# Patient Record
Sex: FEMALE | Race: WHITE | Employment: OTHER | URBAN - METROPOLITAN AREA
[De-identification: names, ages, dates, MRNs, and addresses within clinical notes are randomized per-mention and may not be internally consistent; named-entity substitution may affect disease eponyms.]

---

## 2017-09-07 ENCOUNTER — OFFICE VISIT (OUTPATIENT)
Dept: INTERNAL MEDICINE CLINIC | Age: 57
End: 2017-09-07

## 2017-09-07 VITALS
RESPIRATION RATE: 16 BRPM | HEIGHT: 70 IN | DIASTOLIC BLOOD PRESSURE: 80 MMHG | SYSTOLIC BLOOD PRESSURE: 130 MMHG | WEIGHT: 293 LBS | BODY MASS INDEX: 41.95 KG/M2

## 2017-09-07 DIAGNOSIS — I27.20 PULMONARY HTN (HCC): ICD-10-CM

## 2017-09-07 DIAGNOSIS — E66.01 MORBID OBESITY DUE TO EXCESS CALORIES (HCC): ICD-10-CM

## 2017-09-07 DIAGNOSIS — E11.9 TYPE 2 DIABETES MELLITUS WITHOUT COMPLICATION, WITHOUT LONG-TERM CURRENT USE OF INSULIN (HCC): Primary | ICD-10-CM

## 2017-09-07 DIAGNOSIS — E78.2 MIXED HYPERLIPIDEMIA: ICD-10-CM

## 2017-09-07 DIAGNOSIS — M51.36 DDD (DEGENERATIVE DISC DISEASE), LUMBAR: ICD-10-CM

## 2017-09-07 DIAGNOSIS — E03.4 HYPOTHYROIDISM DUE TO ACQUIRED ATROPHY OF THYROID: ICD-10-CM

## 2017-09-07 DIAGNOSIS — E04.1 THYROID NODULE: ICD-10-CM

## 2017-09-07 DIAGNOSIS — K21.9 GASTROESOPHAGEAL REFLUX DISEASE, ESOPHAGITIS PRESENCE NOT SPECIFIED: ICD-10-CM

## 2017-09-07 DIAGNOSIS — G47.33 OBSTRUCTIVE SLEEP APNEA SYNDROME: ICD-10-CM

## 2017-09-07 DIAGNOSIS — M79.671 PAIN OF RIGHT HEEL: ICD-10-CM

## 2017-09-07 DIAGNOSIS — J30.9 ALLERGIC RHINITIS, UNSPECIFIED ALLERGIC RHINITIS TRIGGER, UNSPECIFIED RHINITIS SEASONALITY: ICD-10-CM

## 2017-09-07 DIAGNOSIS — F50.89 COMPULSIVE OVEREATER: ICD-10-CM

## 2017-09-07 DIAGNOSIS — F32.9 REACTIVE DEPRESSION: ICD-10-CM

## 2017-09-07 DIAGNOSIS — E55.9 VITAMIN D DEFICIENCY: ICD-10-CM

## 2017-09-07 DIAGNOSIS — I10 ESSENTIAL HYPERTENSION: ICD-10-CM

## 2017-09-07 LAB — HBA1C MFR BLD: 9 %

## 2017-09-07 PROCEDURE — 90472 IMMUNIZATION ADMIN EACH ADD: CPT | Performed by: INTERNAL MEDICINE

## 2017-09-07 PROCEDURE — 83036 HEMOGLOBIN GLYCOSYLATED A1C: CPT | Performed by: INTERNAL MEDICINE

## 2017-09-07 PROCEDURE — 90715 TDAP VACCINE 7 YRS/> IM: CPT | Performed by: INTERNAL MEDICINE

## 2017-09-07 PROCEDURE — 90686 IIV4 VACC NO PRSV 0.5 ML IM: CPT | Performed by: INTERNAL MEDICINE

## 2017-09-07 PROCEDURE — 90471 IMMUNIZATION ADMIN: CPT | Performed by: INTERNAL MEDICINE

## 2017-09-07 PROCEDURE — 99205 OFFICE O/P NEW HI 60 MIN: CPT | Performed by: INTERNAL MEDICINE

## 2017-09-07 RX ORDER — AZELASTINE 1 MG/ML
1 SPRAY, METERED NASAL 2 TIMES DAILY
COMMUNITY
End: 2018-03-23 | Stop reason: ALTCHOICE

## 2017-09-07 RX ORDER — GLIPIZIDE 5 MG/1
5 TABLET ORAL
COMMUNITY
End: 2018-04-04 | Stop reason: SDUPTHER

## 2017-09-07 RX ORDER — LEVOTHYROXINE SODIUM 0.12 MG/1
125 TABLET ORAL DAILY
COMMUNITY
End: 2017-12-14 | Stop reason: SDUPTHER

## 2017-09-07 RX ORDER — FAMOTIDINE 10 MG
10 TABLET ORAL 2 TIMES DAILY
COMMUNITY
End: 2018-03-23

## 2017-09-07 RX ORDER — MULTIVIT WITH MINERALS/LUTEIN
250 TABLET ORAL DAILY
COMMUNITY
End: 2018-03-23 | Stop reason: DRUGHIGH

## 2017-09-07 RX ORDER — VENLAFAXINE 75 MG/1
75 TABLET ORAL 3 TIMES DAILY
COMMUNITY
End: 2018-03-23 | Stop reason: DRUGHIGH

## 2017-09-07 RX ORDER — M-VIT,TX,IRON,MINS/CALC/FOLIC 27MG-0.4MG
1 TABLET ORAL DAILY
COMMUNITY

## 2017-09-07 RX ORDER — MONTELUKAST SODIUM 10 MG/1
10 TABLET ORAL NIGHTLY
COMMUNITY
End: 2018-04-04 | Stop reason: SDUPTHER

## 2017-09-07 RX ORDER — ATORVASTATIN CALCIUM 10 MG/1
10 TABLET, FILM COATED ORAL DAILY
COMMUNITY
End: 2018-03-19 | Stop reason: SDUPTHER

## 2017-09-07 RX ORDER — LISINOPRIL 10 MG/1
10 TABLET ORAL DAILY
COMMUNITY
End: 2018-02-12 | Stop reason: SDUPTHER

## 2017-09-07 RX ORDER — ASPIRIN 81 MG/1
81 TABLET, CHEWABLE ORAL DAILY
COMMUNITY

## 2017-09-07 ASSESSMENT — ENCOUNTER SYMPTOMS
ALLERGIC/IMMUNOLOGIC NEGATIVE: 1
BLURRED VISION: 1
SHORTNESS OF BREATH: 0
RESPIRATORY NEGATIVE: 1
VISUAL CHANGE: 0
GASTROINTESTINAL NEGATIVE: 1
ORTHOPNEA: 0

## 2017-11-07 ENCOUNTER — OFFICE VISIT (OUTPATIENT)
Dept: ORTHOPEDIC SURGERY | Age: 57
End: 2017-11-07

## 2017-11-07 VITALS
DIASTOLIC BLOOD PRESSURE: 65 MMHG | WEIGHT: 293 LBS | RESPIRATION RATE: 16 BRPM | BODY MASS INDEX: 41.95 KG/M2 | HEIGHT: 70 IN | SYSTOLIC BLOOD PRESSURE: 111 MMHG | HEART RATE: 68 BPM

## 2017-11-07 DIAGNOSIS — E66.01 MORBID OBESITY WITH BMI OF 50.0-59.9, ADULT (HCC): ICD-10-CM

## 2017-11-07 DIAGNOSIS — T84.84XA PAIN DUE TO TOTAL KNEE REPLACEMENT, INITIAL ENCOUNTER (HCC): Primary | ICD-10-CM

## 2017-11-07 DIAGNOSIS — Z96.659 PAIN DUE TO TOTAL KNEE REPLACEMENT, INITIAL ENCOUNTER (HCC): Primary | ICD-10-CM

## 2017-11-07 DIAGNOSIS — M24.661 ARTHROFIBROSIS OF KNEE JOINT, RIGHT: ICD-10-CM

## 2017-11-07 PROCEDURE — 99204 OFFICE O/P NEW MOD 45 MIN: CPT | Performed by: ORTHOPAEDIC SURGERY

## 2017-11-07 PROCEDURE — 1036F TOBACCO NON-USER: CPT | Performed by: ORTHOPAEDIC SURGERY

## 2017-11-07 PROCEDURE — 3014F SCREEN MAMMO DOC REV: CPT | Performed by: ORTHOPAEDIC SURGERY

## 2017-11-07 PROCEDURE — G8417 CALC BMI ABV UP PARAM F/U: HCPCS | Performed by: ORTHOPAEDIC SURGERY

## 2017-11-07 PROCEDURE — G8428 CUR MEDS NOT DOCUMENT: HCPCS | Performed by: ORTHOPAEDIC SURGERY

## 2017-11-07 PROCEDURE — G8484 FLU IMMUNIZE NO ADMIN: HCPCS | Performed by: ORTHOPAEDIC SURGERY

## 2017-11-07 PROCEDURE — 3017F COLORECTAL CA SCREEN DOC REV: CPT | Performed by: ORTHOPAEDIC SURGERY

## 2017-11-07 NOTE — PROGRESS NOTES
NEW PATIENT ORTHOPAEDIC NOTE    Chief Complaint   Patient presents with    Knee Pain     rt knee pain         HPI  59 y.o. female seen for evaluation of right knee pain. She recently moved to [place] from [place]. Not currently working, previously did payroll  Long history in re: right knee  Right primary TKA in 2004, and she reports lysis of adhesions in 2005 (no components revised)  Underwent right knee revision (patellar button and tibial component with long stemmed implant) in 2012 for aseptic loosening. Femoral component was stable and left intact  She reports has had intermittent symptoms on and off since that revision surgery, mainly medially. It is symptomatic at night, at rest, and with activity  She reports prior to moving here, she saw the treating orthopaedic surgeon's PA in April, and was given what sounds like a pes bursa injection without relief. She states initial ROM post-op was ~ 0 -90, she has stiffened up over the past 2 years, and has been stable since. Left TKA in 2009, and is doing well with that side. She has gained some weight recently due to inability to exercise. I have reviewed and discussed the below pain assessment findings with the patient.   Pain Assessment  Location of Pain: Knee  Location Modifiers: Right  Severity of Pain: 2  Quality of Pain: Aching, Sharp  Duration of Pain:  (walking )  Frequency of Pain:  (walking )  Date Pain First Started:  (on going )  Aggravating Factors: Walking  Limiting Behavior: Some  Relieving Factors: Rest  Result of Injury: No  Work-Related Injury: No  Are there other pain locations you wish to document?: No    ROS  I have read over the ROS from the Patient History Form dated on 11/7/2017  Pertinent positives include headaches/sinus trouble, hypothyroidism, asthma, HTN, peripheral edema, back pain, depression  Rest of 13 point ROS otherwise negative except per HPI, and scanned into the patient's chart under the Media tab.      Allergies   Allergen Reactions    Penicillins Rash        Current Outpatient Prescriptions   Medication Sig Dispense Refill    vitamin D (CHOLECALCIFEROL) 1000 units TABS tablet Take 1,000 Units by mouth daily      azelastine (ASTELIN) 0.1 % nasal spray 1 spray by Nasal route 2 times daily Use in each nostril as directed      fluticasone-salmeterol (ADVAIR) 100-50 MCG/DOSE diskus inhaler Inhale 1 puff into the lungs every 12 hours      levothyroxine (SYNTHROID) 125 MCG tablet Take 125 mcg by mouth Daily      Eflornithine HCl (VANIQA EX) Apply topically      lisinopril (PRINIVIL;ZESTRIL) 10 MG tablet Take 10 mg by mouth daily      atorvastatin (LIPITOR) 10 MG tablet Take 10 mg by mouth daily      glipiZIDE (GLUCOTROL) 5 MG tablet Take 5 mg by mouth 2 times daily (before meals)      venlafaxine (EFFEXOR) 75 MG tablet Take 75 mg by mouth 3 times daily      aspirin 81 MG chewable tablet Take 81 mg by mouth daily      Omega-3 Fatty Acids (FISH OIL CONCENTRATE PO) Take by mouth      Multiple Vitamins-Minerals (THERAPEUTIC MULTIVITAMIN-MINERALS) tablet Take 1 tablet by mouth daily      CINNAMON PO Take by mouth      TURMERIC CURCUMIN PO Take by mouth      Flaxseed, Linseed, (FLAXSEED OIL PO) Take by mouth      Ascorbic Acid (VITAMIN C) 250 MG tablet Take 250 mg by mouth daily      Cyanocobalamin (VITAMIN B-12 PO) Take by mouth      FIBER COMPLETE PO Take by mouth      Calcium Acetate, Phos Binder, (CALCIUM ACETATE PO) Take by mouth      montelukast (SINGULAIR) 10 MG tablet Take 10 mg by mouth nightly      famotidine (PEPCID) 10 MG tablet Take 10 mg by mouth 2 times daily      metFORMIN (GLUCOPHAGE) 500 MG tablet Take 1 tablet by mouth 2 times daily (with meals) 60 tablet 3    canagliflozin (INVOKANA) 100 MG TABS tablet Take 1 tablet by mouth every morning (before breakfast) 30 tablet 5     No current facility-administered medications for this visit.         Past Medical History:   Diagnosis Date    Allergic rhinitis     Asthma     Compulsive overeater     DDD (degenerative disc disease), lumbar     Depression     Diabetes mellitus ([facility]     Diabetic retinopathy ([facility]     GERD (gastroesophageal reflux disease)     Hyperlipidemia     Hypertension     Hypothyroidism     Obesity     Pulmonary HTN     Sleep apnea     cpap    Thyroid nodule     Type 2 diabetes mellitus without complication (HCC)     Vitamin D deficiency         Past Surgical History:   Procedure Laterality Date    JOINT REPLACEMENT Bilateral     tkr    LASIK      REVISION TOTAL KNEE ARTHROPLASTY Right     TOTAL KNEE ARTHROPLASTY Bilateral     WISDOM TOOTH EXTRACTION         Family History   Problem Relation Age of Onset    Other Mother      emphysema, subdural hematoma?  Heart Disease Father     Diabetes Father     Diabetes Maternal Grandmother     Heart Disease Maternal Grandmother     Thyroid Disease Maternal Grandmother        Social History     Social History    Marital status: Single     Spouse name: N/A    Number of children: N/A    Years of education: N/A     Occupational History    Not on file.      Social History Main Topics    Smoking status: Never Smoker    Smokeless tobacco: Never Used    Alcohol use No    Drug use: No    Sexual activity: Not Currently     Other Topics Concern    Not on file     Social History Narrative    No narrative on file        Vitals:    11/07/17 0808   BP: 111/65   Pulse: 68   Resp: 16   Weight: (!) 347 lb (157.4 kg)   Height: 5' 9.5\" (1.765 m)       Physical Exam  Constitutional  well-groomed, well-nourished, morbidly obese  Psychiatric  pleasant,  normal mood & affect, oriented to place, person, and situation  Cardiovascular  RRR, positive peripheral edema, minimal varicose veins  Respiratory  respirations even and unlabored  Gastrointestinal  abdomen soft NDNT and protuberant belly  Skin  no rashes, wounds, or lesions seen  Neck/Spine - negative SLR  Neurological - SILT SP/DP/T/sural/saphenous nerve distributions; EHL/TA/GS intact  Left Lower Extremity: Examination of the left lower extremity does not show any tenderness, deformity or injury. Range of motion is unremarkable, 0 - 100. There is no gross instability. There are no rashes, ulcerations or lesions. Strength and tone are normal.  Right knee - mild effusion, normal alignment   Previous midline scar well healed, no erythema or drainage   Mild TTP medial joint line   Stable to varus and valgus stress   ROM 14 - 75 degrees, normal patellar tracking    Imaging:  Images were personally reviewed by myself and discussed with the patient  Right knee 4 views performed today in clinic   - s/p L TKA. S/p R TKA. Long stemmed revision tibial component with cementation. There is lucency beneath the tibial base plate cement, cannot correlate to previous XRs as these are not available from [place]. However, there is no lysis or pedestal-ing around the tibial stem. Cement mantle distally looks intact. Femoral component looks stable. Lucency around the patellar button with mild to moderate lateral patellar tilt. Assessment & Plan:  59 y.o. female who presents with   1. Pain due to total knee replacement, initial encounter ([facility])  XR KNEE RIGHT (MIN 4 VIEWS)    RIGHT   2. Arthrofibrosis of knee joint, right     3. Morbid obesity with BMI of 50.0-59.9, adult ([address]  [employer] [facility]       No orders of the defined types were placed in this encounter.     I have recommended weight loss referral to reduce stress on implants  There is evidence of lucency, however components appear stable for the most part, monitor for now  Continue ice and NSAIDs  No injections

## 2017-11-09 ENCOUNTER — HOSPITAL ENCOUNTER (OUTPATIENT)
Dept: WOMENS IMAGING | Age: 57
Discharge: HOME | End: 2017-11-09
Attending: INTERNAL MEDICINE | Admitting: INTERNAL MEDICINE

## 2017-11-09 ENCOUNTER — TELEPHONE (OUTPATIENT)
Dept: BARIATRICS/WEIGHT MGMT | Age: 57
End: 2017-11-09

## 2017-11-09 DIAGNOSIS — Z12.31 VISIT FOR SCREENING MAMMOGRAM: ICD-10-CM

## 2017-11-28 DIAGNOSIS — E11.9 TYPE 2 DIABETES MELLITUS WITHOUT COMPLICATION, WITHOUT LONG-TERM CURRENT USE OF INSULIN (HCC): ICD-10-CM

## 2017-12-13 ENCOUNTER — OFFICE VISIT (OUTPATIENT)
Dept: INTERNAL MEDICINE CLINIC | Age: 57
End: 2017-12-13

## 2017-12-13 VITALS
RESPIRATION RATE: 16 BRPM | HEART RATE: 88 BPM | WEIGHT: 293 LBS | SYSTOLIC BLOOD PRESSURE: 126 MMHG | HEIGHT: 70 IN | BODY MASS INDEX: 41.95 KG/M2 | DIASTOLIC BLOOD PRESSURE: 80 MMHG

## 2017-12-13 DIAGNOSIS — F50.89 COMPULSIVE OVEREATER: ICD-10-CM

## 2017-12-13 DIAGNOSIS — E11.9 TYPE 2 DIABETES MELLITUS WITHOUT COMPLICATION, WITHOUT LONG-TERM CURRENT USE OF INSULIN (HCC): Primary | ICD-10-CM

## 2017-12-13 DIAGNOSIS — F32.9 REACTIVE DEPRESSION: ICD-10-CM

## 2017-12-13 DIAGNOSIS — G47.33 OBSTRUCTIVE SLEEP APNEA SYNDROME: ICD-10-CM

## 2017-12-13 DIAGNOSIS — J01.10 ACUTE NON-RECURRENT FRONTAL SINUSITIS: Primary | ICD-10-CM

## 2017-12-13 DIAGNOSIS — I27.20 PULMONARY HTN (HCC): ICD-10-CM

## 2017-12-13 DIAGNOSIS — E78.2 MIXED HYPERLIPIDEMIA: ICD-10-CM

## 2017-12-13 DIAGNOSIS — E04.1 THYROID NODULE: ICD-10-CM

## 2017-12-13 DIAGNOSIS — I10 ESSENTIAL HYPERTENSION: ICD-10-CM

## 2017-12-13 DIAGNOSIS — E55.9 VITAMIN D DEFICIENCY: ICD-10-CM

## 2017-12-13 DIAGNOSIS — E03.4 HYPOTHYROIDISM DUE TO ACQUIRED ATROPHY OF THYROID: ICD-10-CM

## 2017-12-13 DIAGNOSIS — K21.9 GASTROESOPHAGEAL REFLUX DISEASE, ESOPHAGITIS PRESENCE NOT SPECIFIED: ICD-10-CM

## 2017-12-13 LAB
A/G RATIO: 1.4 (ref 1.1–2.2)
ALBUMIN SERPL-MCNC: 4.3 G/DL (ref 3.4–5)
ALP BLD-CCNC: 86 U/L (ref 40–129)
ALT SERPL-CCNC: 29 U/L (ref 10–40)
ANION GAP SERPL CALCULATED.3IONS-SCNC: 19 MMOL/L (ref 3–16)
AST SERPL-CCNC: 28 U/L (ref 15–37)
BILIRUB SERPL-MCNC: 0.4 MG/DL (ref 0–1)
BUN BLDV-MCNC: 17 MG/DL (ref 7–20)
CALCIUM SERPL-MCNC: 9.9 MG/DL (ref 8.3–10.6)
CHLORIDE BLD-SCNC: 98 MMOL/L (ref 99–110)
CHOLESTEROL, TOTAL: 158 MG/DL (ref 0–199)
CO2: 24 MMOL/L (ref 21–32)
CREAT SERPL-MCNC: 0.9 MG/DL (ref 0.6–1.1)
CREATININE URINE: 200.9 MG/DL (ref 28–259)
GFR AFRICAN AMERICAN: >60
GFR NON-AFRICAN AMERICAN: >60
GLOBULIN: 3.1 G/DL
GLUCOSE BLD-MCNC: 170 MG/DL (ref 70–99)
HBA1C MFR BLD: 7.9 %
HDLC SERPL-MCNC: 41 MG/DL (ref 40–60)
LDL CHOLESTEROL CALCULATED: 87 MG/DL
MICROALBUMIN UR-MCNC: <1.2 MG/DL
MICROALBUMIN/CREAT UR-RTO: NORMAL MG/G (ref 0–30)
POTASSIUM SERPL-SCNC: 4.8 MMOL/L (ref 3.5–5.1)
SODIUM BLD-SCNC: 141 MMOL/L (ref 136–145)
T4 FREE: 1.4 NG/DL (ref 0.9–1.8)
TOTAL PROTEIN: 7.4 G/DL (ref 6.4–8.2)
TRIGL SERPL-MCNC: 150 MG/DL (ref 0–150)
TSH REFLEX: 5.26 UIU/ML (ref 0.27–4.2)
VITAMIN D 25-HYDROXY: 37.2 NG/ML
VLDLC SERPL CALC-MCNC: 30 MG/DL

## 2017-12-13 PROCEDURE — 3045F PR MOST RECENT HEMOGLOBIN A1C LEVEL 7.0-9.0%: CPT | Performed by: INTERNAL MEDICINE

## 2017-12-13 PROCEDURE — 3014F SCREEN MAMMO DOC REV: CPT | Performed by: INTERNAL MEDICINE

## 2017-12-13 PROCEDURE — 1036F TOBACCO NON-USER: CPT | Performed by: INTERNAL MEDICINE

## 2017-12-13 PROCEDURE — 99214 OFFICE O/P EST MOD 30 MIN: CPT | Performed by: INTERNAL MEDICINE

## 2017-12-13 PROCEDURE — G8427 DOCREV CUR MEDS BY ELIG CLIN: HCPCS | Performed by: INTERNAL MEDICINE

## 2017-12-13 PROCEDURE — 36415 COLL VENOUS BLD VENIPUNCTURE: CPT | Performed by: INTERNAL MEDICINE

## 2017-12-13 PROCEDURE — 3017F COLORECTAL CA SCREEN DOC REV: CPT | Performed by: INTERNAL MEDICINE

## 2017-12-13 PROCEDURE — G8484 FLU IMMUNIZE NO ADMIN: HCPCS | Performed by: INTERNAL MEDICINE

## 2017-12-13 PROCEDURE — G8417 CALC BMI ABV UP PARAM F/U: HCPCS | Performed by: INTERNAL MEDICINE

## 2017-12-13 PROCEDURE — 83036 HEMOGLOBIN GLYCOSYLATED A1C: CPT | Performed by: INTERNAL MEDICINE

## 2017-12-13 RX ORDER — AZITHROMYCIN 250 MG/1
TABLET, FILM COATED ORAL
Qty: 1 PACKET | Refills: 0 | Status: SHIPPED | OUTPATIENT
Start: 2017-12-13 | End: 2017-12-23

## 2017-12-13 ASSESSMENT — ENCOUNTER SYMPTOMS
WHEEZING: 0
SHORTNESS OF BREATH: 0
SINUS PAIN: 1
VISUAL CHANGE: 0
ORTHOPNEA: 0
SORE THROAT: 1
RHINORRHEA: 1
CHEST TIGHTNESS: 0
COUGH: 0
SINUS PRESSURE: 1
BLURRED VISION: 0

## 2017-12-13 NOTE — PROGRESS NOTES
Subjective:    cc:  dm, htn, hld, gillian, hypothyroid, depression check  Patient ID: Parrish Lizama is a 59 y.o. female. Diabetes   She presents for her follow-up diabetic visit. She has type 2 diabetes mellitus. Her disease course has been stable. Hypoglycemia symptoms include headaches. Pertinent negatives for hypoglycemia include no sweats. There are no diabetic associated symptoms. Pertinent negatives for diabetes include no blurred vision, no chest pain, no fatigue, no foot paresthesias, no polydipsia, no polyphagia, no polyuria, no visual change and no weight loss. There are no hypoglycemic complications. Symptoms are stable. Diabetic complications include retinopathy. Pertinent negatives for diabetic complications include no peripheral neuropathy. Risk factors for coronary artery disease include dyslipidemia, diabetes mellitus, hypertension and sedentary lifestyle. Current diabetic treatment includes oral agent (dual therapy). She is compliant with treatment all of the time. Her weight is decreasing steadily (wt down 15 lbs). She is following a generally unhealthy diet. When asked about meal planning, she reported none. She has had a previous visit with a dietitian. She never participates in exercise. There is no compliance with monitoring of blood glucose. An ACE inhibitor/angiotensin II receptor blocker is being taken. She does not see a podiatrist.Eye exam is current. Hypertension   This is a chronic problem. The current episode started more than 1 year ago. The problem is unchanged. The problem is controlled. Associated symptoms include headaches and peripheral edema. Pertinent negatives include no anxiety, blurred vision, chest pain, orthopnea, palpitations, PND, shortness of breath or sweats. There are no associated agents to hypertension. Risk factors for coronary artery disease include diabetes mellitus, obesity, post-menopausal state, sedentary lifestyle and dyslipidemia.  Past treatments include ACE chewable tablet Take 81 mg by mouth daily Yes Historical Provider, MD   Omega-3 Fatty Acids (FISH OIL CONCENTRATE PO) Take by mouth Yes Historical Provider, MD   Multiple Vitamins-Minerals (THERAPEUTIC MULTIVITAMIN-MINERALS) tablet Take 1 tablet by mouth daily Yes Historical Provider, MD   CINNAMON PO Take by mouth Yes Historical Provider, MD   TURMERIC CURCUMIN PO Take by mouth Yes Historical Provider, MD   Flaxseed, Linseed, (FLAXSEED OIL PO) Take by mouth Yes Historical Provider, MD   Ascorbic Acid (VITAMIN C) 250 MG tablet Take 250 mg by mouth daily Yes Historical Provider, MD   Cyanocobalamin (VITAMIN B-12 PO) Take by mouth Yes Historical Provider, MD   FIBER COMPLETE PO Take by mouth Yes Historical Provider, MD   Calcium Acetate, Phos Binder, (CALCIUM ACETATE PO) Take by mouth Yes Historical Provider, MD   montelukast (SINGULAIR) 10 MG tablet Take 10 mg by mouth nightly Yes Historical Provider, MD   famotidine (PEPCID) 10 MG tablet Take 10 mg by mouth 2 times daily Yes Historical Provider, MD   metFORMIN (GLUCOPHAGE) 500 MG tablet Take 1 tablet by mouth 2 times daily (with meals) Yes Jere Nichole MD           /80 (Site: Left Arm, Position: Sitting, Cuff Size: Thigh)   Pulse 88   Resp 16   Ht 5' 9.5\" (1.765 m)   Wt (!) 332 lb (150.6 kg)   BMI 48.32 kg/m²     Objective:   Physical Exam   Constitutional: She is oriented to person, place, and time. She appears well-developed and well-nourished. No distress. HENT:   Head: Normocephalic and atraumatic. Right Ear: External ear normal.   Left Ear: External ear normal.   Nose: Nose normal.   Mouth/Throat: Oropharynx is clear and moist. No oropharyngeal exudate. Eyes: Conjunctivae and EOM are normal. Pupils are equal, round, and reactive to light. Right eye exhibits no discharge. Left eye exhibits no discharge. No scleral icterus. Neck: No JVD present.    No bruits   Cardiovascular: Normal rate, regular rhythm, normal heart sounds and intact distal pulses. Exam reveals no gallop and no friction rub. No murmur heard. Pulmonary/Chest: Effort normal and breath sounds normal. No respiratory distress. She has no wheezes. She has no rales. Musculoskeletal: She exhibits edema. Trace edema   Lymphadenopathy:     She has no cervical adenopathy. Neurological: She is alert and oriented to person, place, and time. Monofilament normal bilat feet   Skin: Skin is warm and dry. She is not diaphoretic. No erythema. Psychiatric: She has a normal mood and affect. Her behavior is normal. Judgment and thought content normal.   Vitals reviewed. Assessment:      1. Type 2 diabetes mellitus without complication, without long-term current use of insulin ([address]    2. Essential hypertension    3. Mixed hyperlipidemia    4. Thyroid nodule    5. Hypothyroidism due to acquired atrophy of thyroid    6. Obstructive sleep apnea syndrome    7. Gastroesophageal reflux disease, esophagitis presence not specified    8. Reactive depression    9. Compulsive overeater    10. Vitamin D deficiency    11. Pulmonary HTN            Plan:      Santana Mackey was seen today for diabetes and cough. Diagnoses and all orders for this visit:    Type 2 diabetes mellitus without complication, without long-term current use of insulin ([address]):  Improved. Cont same meds. Consider switching to jardiance. -     POCT glycosylated hemoglobin (Hb A1C)  -     MICROALBUMIN / CREATININE URINE RATIO; Future  -     Diabetic Foot Exam  -     Lipid Panel; Future  -     Comprehensive Metabolic Panel; Future  -     TSH with Reflex; Future    Essential hypertension:  Stable, cont same meds  -     Comprehensive Metabolic Panel; Future    Mixed hyperlipidemia:  Stable, cont same meds  -     Lipid Panel; Future  -     Comprehensive Metabolic Panel; Future    Thyroid nodule:  Stable, cont same meds  -     TSH with Reflex;  Future    Hypothyroidism due to acquired atrophy of thyroid:  Stable, cont same meds  - TSH with Reflex; Future    Obstructive sleep apnea syndrome:  cpap  -     TSH with Reflex; Future    Gastroesophageal reflux disease, esophagitis presence not specified:  Stable, cont same meds  -     Comprehensive Metabolic Panel; Future    Reactive depression:  Stable, cont same meds  -     TSH with Reflex;  Future    Compulsive overeater:  Urge diet and exercise    Vitamin D deficiency:  Check level    Pulmonary HTN:  Stable, cont same meds      3 months

## 2017-12-14 DIAGNOSIS — E03.4 HYPOTHYROIDISM DUE TO ACQUIRED ATROPHY OF THYROID: Primary | ICD-10-CM

## 2017-12-14 RX ORDER — LEVOTHYROXINE SODIUM 137 UG/1
137 TABLET ORAL DAILY
Qty: 30 TABLET | Refills: 5 | Status: SHIPPED | OUTPATIENT
Start: 2017-12-14 | End: 2018-10-28 | Stop reason: SDUPTHER

## 2018-02-12 DIAGNOSIS — E11.9 TYPE 2 DIABETES MELLITUS WITHOUT COMPLICATION, WITHOUT LONG-TERM CURRENT USE OF INSULIN (HCC): ICD-10-CM

## 2018-02-12 RX ORDER — LISINOPRIL 10 MG/1
10 TABLET ORAL DAILY
Qty: 90 TABLET | Refills: 5 | Status: SHIPPED | OUTPATIENT
Start: 2018-02-12 | End: 2018-12-15 | Stop reason: SDUPTHER

## 2018-03-06 ENCOUNTER — TELEPHONE (OUTPATIENT)
Dept: INTERNAL MEDICINE CLINIC | Age: 58
End: 2018-03-06

## 2018-03-19 ENCOUNTER — OFFICE VISIT (OUTPATIENT)
Dept: INTERNAL MEDICINE CLINIC | Age: 58
End: 2018-03-19

## 2018-03-19 VITALS
BODY MASS INDEX: 49.2 KG/M2 | HEART RATE: 90 BPM | DIASTOLIC BLOOD PRESSURE: 90 MMHG | WEIGHT: 293 LBS | SYSTOLIC BLOOD PRESSURE: 122 MMHG

## 2018-03-19 DIAGNOSIS — E04.1 THYROID NODULE: ICD-10-CM

## 2018-03-19 DIAGNOSIS — K21.9 GASTROESOPHAGEAL REFLUX DISEASE, ESOPHAGITIS PRESENCE NOT SPECIFIED: ICD-10-CM

## 2018-03-19 DIAGNOSIS — F32.9 REACTIVE DEPRESSION: ICD-10-CM

## 2018-03-19 DIAGNOSIS — E55.9 VITAMIN D DEFICIENCY: ICD-10-CM

## 2018-03-19 DIAGNOSIS — I10 ESSENTIAL HYPERTENSION: ICD-10-CM

## 2018-03-19 DIAGNOSIS — E11.9 TYPE 2 DIABETES MELLITUS WITHOUT COMPLICATION, WITHOUT LONG-TERM CURRENT USE OF INSULIN (HCC): Primary | ICD-10-CM

## 2018-03-19 DIAGNOSIS — I27.20 PULMONARY HTN (HCC): ICD-10-CM

## 2018-03-19 DIAGNOSIS — E78.2 MIXED HYPERLIPIDEMIA: ICD-10-CM

## 2018-03-19 DIAGNOSIS — E03.4 HYPOTHYROIDISM DUE TO ACQUIRED ATROPHY OF THYROID: ICD-10-CM

## 2018-03-19 DIAGNOSIS — G47.33 OBSTRUCTIVE SLEEP APNEA SYNDROME: ICD-10-CM

## 2018-03-19 DIAGNOSIS — F50.89 COMPULSIVE OVEREATER: ICD-10-CM

## 2018-03-19 LAB
GLUCOSE BLD-MCNC: 170 MG/DL
HBA1C MFR BLD: 7.5 %

## 2018-03-19 PROCEDURE — 83036 HEMOGLOBIN GLYCOSYLATED A1C: CPT | Performed by: INTERNAL MEDICINE

## 2018-03-19 PROCEDURE — 1036F TOBACCO NON-USER: CPT | Performed by: INTERNAL MEDICINE

## 2018-03-19 PROCEDURE — 99214 OFFICE O/P EST MOD 30 MIN: CPT | Performed by: INTERNAL MEDICINE

## 2018-03-19 PROCEDURE — G8427 DOCREV CUR MEDS BY ELIG CLIN: HCPCS | Performed by: INTERNAL MEDICINE

## 2018-03-19 PROCEDURE — 3014F SCREEN MAMMO DOC REV: CPT | Performed by: INTERNAL MEDICINE

## 2018-03-19 PROCEDURE — G8417 CALC BMI ABV UP PARAM F/U: HCPCS | Performed by: INTERNAL MEDICINE

## 2018-03-19 PROCEDURE — 3017F COLORECTAL CA SCREEN DOC REV: CPT | Performed by: INTERNAL MEDICINE

## 2018-03-19 PROCEDURE — G8482 FLU IMMUNIZE ORDER/ADMIN: HCPCS | Performed by: INTERNAL MEDICINE

## 2018-03-19 PROCEDURE — 3045F PR MOST RECENT HEMOGLOBIN A1C LEVEL 7.0-9.0%: CPT | Performed by: INTERNAL MEDICINE

## 2018-03-19 PROCEDURE — 82962 GLUCOSE BLOOD TEST: CPT | Performed by: INTERNAL MEDICINE

## 2018-03-19 RX ORDER — ATORVASTATIN CALCIUM 10 MG/1
10 TABLET, FILM COATED ORAL DAILY
Qty: 90 TABLET | Refills: 3 | Status: SHIPPED | OUTPATIENT
Start: 2018-03-19 | End: 2019-07-28 | Stop reason: SDUPTHER

## 2018-03-19 ASSESSMENT — ENCOUNTER SYMPTOMS
VISUAL CHANGE: 0
COUGH: 0
ORTHOPNEA: 0
SHORTNESS OF BREATH: 0
CHEST TIGHTNESS: 0
BLURRED VISION: 0

## 2018-03-19 NOTE — PROGRESS NOTES
Subjective:    cc:  dm, htn, hld, gillian, hypothyroid, depression check  Patient ID: Carrie Melendez is a 59 y.o. female. Diabetes   She presents for her follow-up diabetic visit. She has type 2 diabetes mellitus. Her disease course has been stable. There are no diabetic associated symptoms. Pertinent negatives for diabetes include no blurred vision, no chest pain, no fatigue, no foot paresthesias, no polydipsia, no polyphagia, no polyuria and no visual change. There are no hypoglycemic complications. Symptoms are stable. Diabetic complications include retinopathy. Pertinent negatives for diabetic complications include no peripheral neuropathy. Risk factors for coronary artery disease include dyslipidemia, diabetes mellitus, hypertension and sedentary lifestyle. Current diabetic treatment includes oral agent (dual therapy). She is compliant with treatment all of the time. Her weight is fluctuating minimally. She is following a generally unhealthy diet. When asked about meal planning, she reported none. She has had a previous visit with a dietitian. She never participates in exercise. There is no compliance with monitoring of blood glucose. An ACE inhibitor/angiotensin II receptor blocker is being taken. She does not see a podiatrist.Eye exam is current. Hypertension   This is a chronic problem. The current episode started more than 1 year ago. The problem is unchanged. The problem is controlled. Associated symptoms include peripheral edema. Pertinent negatives include no anxiety, blurred vision, chest pain, orthopnea, palpitations, PND or shortness of breath. There are no associated agents to hypertension. Risk factors for coronary artery disease include diabetes mellitus, obesity, post-menopausal state, sedentary lifestyle and dyslipidemia. Past treatments include ACE inhibitors. The current treatment provides significant improvement. Compliance problems include exercise and psychosocial issues.   Hypertensive end-organ damage includes retinopathy. Identifiable causes of hypertension include sleep apnea and a thyroid problem. Hyperlipidemia   This is a chronic problem. The current episode started more than 1 year ago. Exacerbating diseases include diabetes, hypothyroidism and obesity. There are no known factors aggravating her hyperlipidemia. Pertinent negatives include no chest pain, focal sensory loss, focal weakness, leg pain or shortness of breath. Current antihyperlipidemic treatment includes statins. Compliance problems include adherence to diet, adherence to exercise and psychosocial issues. Risk factors for coronary artery disease include diabetes mellitus, dyslipidemia, hypertension, obesity, a sedentary lifestyle and post-menopausal.     Hypothyroid :  Chronic problem. Compliant with meds. Minimal constipation. Wt down 15 lbs with start of invokana. Did not go up to 137 mcg. Still taking 125. Diabetic retinopathy:  Last exam 7/17. Did have some bleed. Thinks it is post vitreus. .  Now with floater incenter of vision. Depression:  Chronic problem x yrs. Compliant with meds. Depression under good control with current medications. Wants to continue. Compulsive overeater. effexor helps. Wt up several lbs. Not eating well. Asthma:  Well controlled on advair. Never uses rescue inhaler. Sleep apnea:  Using cpap daily      Review of Systems   Constitutional: Negative for fatigue. Eyes: Negative for blurred vision and visual disturbance. Respiratory: Negative for cough, chest tightness and shortness of breath. Cardiovascular: Negative for chest pain, palpitations, orthopnea, leg swelling and PND. Endocrine: Negative for polydipsia, polyphagia and polyuria. Neurological: Negative for focal weakness, syncope and light-headedness. Prior to Visit Medications    Medication Sig Taking?  Authorizing Provider   canagliflozin (INVOKANA) 100 MG TABS tablet Take 1 tablet by mouth

## 2018-04-03 ENCOUNTER — PATIENT MESSAGE (OUTPATIENT)
Dept: INTERNAL MEDICINE CLINIC | Age: 58
End: 2018-04-03

## 2018-04-04 RX ORDER — GLIPIZIDE 5 MG/1
5 TABLET ORAL
Qty: 60 TABLET | Refills: 3 | Status: SHIPPED | OUTPATIENT
Start: 2018-04-04 | End: 2018-04-06 | Stop reason: SDUPTHER

## 2018-04-04 RX ORDER — MONTELUKAST SODIUM 10 MG/1
10 TABLET ORAL NIGHTLY
Qty: 30 TABLET | Refills: 3 | Status: SHIPPED | OUTPATIENT
Start: 2018-04-04 | End: 2018-04-06 | Stop reason: SDUPTHER

## 2018-04-04 RX ORDER — VENLAFAXINE 75 MG/1
75 TABLET ORAL DAILY
Qty: 90 TABLET | Refills: 0 | Status: SHIPPED | OUTPATIENT
Start: 2018-04-04 | End: 2018-06-19 | Stop reason: SDUPTHER

## 2018-04-06 ENCOUNTER — OFFICE VISIT (OUTPATIENT)
Dept: INTERNAL MEDICINE CLINIC | Age: 58
End: 2018-04-06

## 2018-04-06 ENCOUNTER — TELEPHONE (OUTPATIENT)
Dept: INTERNAL MEDICINE CLINIC | Age: 58
End: 2018-04-06

## 2018-04-06 VITALS
TEMPERATURE: 98.3 F | WEIGHT: 293 LBS | SYSTOLIC BLOOD PRESSURE: 122 MMHG | RESPIRATION RATE: 14 BRPM | DIASTOLIC BLOOD PRESSURE: 78 MMHG | BODY MASS INDEX: 41.95 KG/M2 | HEART RATE: 72 BPM | HEIGHT: 70 IN | OXYGEN SATURATION: 95 %

## 2018-04-06 DIAGNOSIS — R04.0 EPISTAXIS: Primary | ICD-10-CM

## 2018-04-06 PROCEDURE — 99213 OFFICE O/P EST LOW 20 MIN: CPT | Performed by: NURSE PRACTITIONER

## 2018-04-06 RX ORDER — MONTELUKAST SODIUM 10 MG/1
10 TABLET ORAL NIGHTLY
Qty: 90 TABLET | Refills: 3 | Status: SHIPPED | OUTPATIENT
Start: 2018-04-06 | End: 2018-04-09 | Stop reason: SDUPTHER

## 2018-04-06 RX ORDER — ECHINACEA PURPUREA EXTRACT 125 MG
1 TABLET ORAL PRN
Qty: 1 BOTTLE | Refills: 3 | Status: SHIPPED | OUTPATIENT
Start: 2018-04-06

## 2018-04-06 RX ORDER — MONTELUKAST SODIUM 10 MG/1
10 TABLET ORAL NIGHTLY
Qty: 30 TABLET | Refills: 3 | Status: SHIPPED | OUTPATIENT
Start: 2018-04-06 | End: 2018-04-06 | Stop reason: SDUPTHER

## 2018-04-06 RX ORDER — GLIPIZIDE 5 MG/1
5 TABLET ORAL
Qty: 60 TABLET | Refills: 3 | Status: SHIPPED | OUTPATIENT
Start: 2018-04-06 | End: 2018-04-09 | Stop reason: SDUPTHER

## 2018-04-06 ASSESSMENT — ENCOUNTER SYMPTOMS
SINUS PRESSURE: 1
SINUS PAIN: 0
SORE THROAT: 0
SINUS COMPLAINT: 1
COUGH: 0
SWOLLEN GLANDS: 0
SHORTNESS OF BREATH: 0
RHINORRHEA: 0

## 2018-04-09 RX ORDER — MONTELUKAST SODIUM 10 MG/1
10 TABLET ORAL NIGHTLY
Qty: 90 TABLET | Refills: 3 | Status: SHIPPED | OUTPATIENT
Start: 2018-04-09 | End: 2019-03-08 | Stop reason: SDUPTHER

## 2018-04-09 RX ORDER — GLIPIZIDE 5 MG/1
5 TABLET ORAL
Qty: 180 TABLET | Refills: 3 | Status: SHIPPED | OUTPATIENT
Start: 2018-04-09 | End: 2018-04-10 | Stop reason: DRUGHIGH

## 2018-04-10 ENCOUNTER — TELEPHONE (OUTPATIENT)
Dept: INTERNAL MEDICINE CLINIC | Age: 58
End: 2018-04-10

## 2018-04-10 RX ORDER — GLIPIZIDE 5 MG/1
10 TABLET ORAL
Qty: 360 TABLET | Refills: 3 | Status: SHIPPED | OUTPATIENT
Start: 2018-04-10 | End: 2018-04-19

## 2018-04-10 RX ORDER — GLIPIZIDE 5 MG/1
10 TABLET ORAL
Qty: 360 TABLET | Refills: 3 | Status: SHIPPED | OUTPATIENT
Start: 2018-04-10 | End: 2019-04-22 | Stop reason: SDUPTHER

## 2018-04-11 ENCOUNTER — PATIENT MESSAGE (OUTPATIENT)
Dept: INTERNAL MEDICINE CLINIC | Age: 58
End: 2018-04-11

## 2018-04-12 ENCOUNTER — OFFICE VISIT (OUTPATIENT)
Dept: INTERNAL MEDICINE CLINIC | Age: 58
End: 2018-04-12

## 2018-04-12 VITALS — HEIGHT: 70 IN | RESPIRATION RATE: 16 BRPM | BODY MASS INDEX: 41.95 KG/M2 | WEIGHT: 293 LBS | TEMPERATURE: 98.5 F

## 2018-04-12 DIAGNOSIS — R21 RASH: Primary | ICD-10-CM

## 2018-04-12 PROCEDURE — 1036F TOBACCO NON-USER: CPT | Performed by: INTERNAL MEDICINE

## 2018-04-12 PROCEDURE — G8427 DOCREV CUR MEDS BY ELIG CLIN: HCPCS | Performed by: INTERNAL MEDICINE

## 2018-04-12 PROCEDURE — 99213 OFFICE O/P EST LOW 20 MIN: CPT | Performed by: INTERNAL MEDICINE

## 2018-04-12 PROCEDURE — 3014F SCREEN MAMMO DOC REV: CPT | Performed by: INTERNAL MEDICINE

## 2018-04-12 PROCEDURE — 3017F COLORECTAL CA SCREEN DOC REV: CPT | Performed by: INTERNAL MEDICINE

## 2018-04-12 PROCEDURE — G8417 CALC BMI ABV UP PARAM F/U: HCPCS | Performed by: INTERNAL MEDICINE

## 2018-04-12 RX ORDER — CEPHALEXIN 500 MG/1
500 CAPSULE ORAL 3 TIMES DAILY
Qty: 30 CAPSULE | Refills: 0 | Status: SHIPPED | OUTPATIENT
Start: 2018-04-12 | End: 2018-04-22

## 2018-04-12 RX ORDER — FAMCICLOVIR 500 MG/1
500 TABLET, FILM COATED ORAL 3 TIMES DAILY
Qty: 30 TABLET | Refills: 0 | Status: SHIPPED | OUTPATIENT
Start: 2018-04-12 | End: 2018-04-22

## 2018-04-19 ENCOUNTER — OFFICE VISIT (OUTPATIENT)
Dept: INTERNAL MEDICINE CLINIC | Age: 58
End: 2018-04-19

## 2018-04-19 VITALS
RESPIRATION RATE: 16 BRPM | BODY MASS INDEX: 48.91 KG/M2 | WEIGHT: 293 LBS | HEART RATE: 64 BPM | SYSTOLIC BLOOD PRESSURE: 126 MMHG | DIASTOLIC BLOOD PRESSURE: 80 MMHG | OXYGEN SATURATION: 95 %

## 2018-04-19 DIAGNOSIS — L03.211 FACIAL CELLULITIS: Primary | ICD-10-CM

## 2018-04-19 PROCEDURE — 3017F COLORECTAL CA SCREEN DOC REV: CPT | Performed by: INTERNAL MEDICINE

## 2018-04-19 PROCEDURE — 99212 OFFICE O/P EST SF 10 MIN: CPT | Performed by: INTERNAL MEDICINE

## 2018-04-19 PROCEDURE — G8427 DOCREV CUR MEDS BY ELIG CLIN: HCPCS | Performed by: INTERNAL MEDICINE

## 2018-04-19 PROCEDURE — G8417 CALC BMI ABV UP PARAM F/U: HCPCS | Performed by: INTERNAL MEDICINE

## 2018-04-19 PROCEDURE — 3014F SCREEN MAMMO DOC REV: CPT | Performed by: INTERNAL MEDICINE

## 2018-04-19 PROCEDURE — 1036F TOBACCO NON-USER: CPT | Performed by: INTERNAL MEDICINE

## 2018-04-19 ASSESSMENT — PATIENT HEALTH QUESTIONNAIRE - PHQ9
SUM OF ALL RESPONSES TO PHQ9 QUESTIONS 1 & 2: 0
SUM OF ALL RESPONSES TO PHQ QUESTIONS 1-9: 0
2. FEELING DOWN, DEPRESSED OR HOPELESS: 0
1. LITTLE INTEREST OR PLEASURE IN DOING THINGS: 0

## 2018-04-19 ASSESSMENT — ENCOUNTER SYMPTOMS: COLOR CHANGE: 1

## 2018-05-07 DIAGNOSIS — E11.9 TYPE 2 DIABETES MELLITUS WITHOUT COMPLICATION, WITHOUT LONG-TERM CURRENT USE OF INSULIN (HCC): Primary | ICD-10-CM

## 2018-06-20 RX ORDER — VENLAFAXINE 75 MG/1
TABLET ORAL
Qty: 90 TABLET | Refills: 3 | Status: SHIPPED | OUTPATIENT
Start: 2018-06-20 | End: 2019-04-15 | Stop reason: SDUPTHER

## 2018-06-21 ENCOUNTER — OFFICE VISIT (OUTPATIENT)
Dept: INTERNAL MEDICINE CLINIC | Age: 58
End: 2018-06-21

## 2018-06-21 VITALS
WEIGHT: 293 LBS | HEIGHT: 70 IN | SYSTOLIC BLOOD PRESSURE: 126 MMHG | BODY MASS INDEX: 41.95 KG/M2 | HEART RATE: 88 BPM | RESPIRATION RATE: 16 BRPM | DIASTOLIC BLOOD PRESSURE: 84 MMHG

## 2018-06-21 DIAGNOSIS — E11.319 DIABETIC RETINOPATHY OF RIGHT EYE ASSOCIATED WITH TYPE 2 DIABETES MELLITUS, MACULAR EDEMA PRESENCE UNSPECIFIED, UNSPECIFIED RETINOPATHY SEVERITY (HCC): ICD-10-CM

## 2018-06-21 DIAGNOSIS — E55.9 VITAMIN D DEFICIENCY: ICD-10-CM

## 2018-06-21 DIAGNOSIS — E11.9 TYPE 2 DIABETES MELLITUS WITHOUT COMPLICATION, WITHOUT LONG-TERM CURRENT USE OF INSULIN (HCC): Primary | ICD-10-CM

## 2018-06-21 DIAGNOSIS — K21.9 GASTROESOPHAGEAL REFLUX DISEASE, ESOPHAGITIS PRESENCE NOT SPECIFIED: ICD-10-CM

## 2018-06-21 DIAGNOSIS — F32.9 REACTIVE DEPRESSION: ICD-10-CM

## 2018-06-21 DIAGNOSIS — G47.33 OBSTRUCTIVE SLEEP APNEA SYNDROME: ICD-10-CM

## 2018-06-21 DIAGNOSIS — E03.8 SUBCLINICAL HYPOTHYROIDISM: ICD-10-CM

## 2018-06-21 DIAGNOSIS — E04.1 THYROID NODULE: ICD-10-CM

## 2018-06-21 DIAGNOSIS — E03.4 HYPOTHYROIDISM DUE TO ACQUIRED ATROPHY OF THYROID: ICD-10-CM

## 2018-06-21 DIAGNOSIS — E78.2 MIXED HYPERLIPIDEMIA: ICD-10-CM

## 2018-06-21 DIAGNOSIS — F50.89 COMPULSIVE OVEREATER: ICD-10-CM

## 2018-06-21 DIAGNOSIS — I27.20 PULMONARY HTN (HCC): ICD-10-CM

## 2018-06-21 DIAGNOSIS — I10 ESSENTIAL HYPERTENSION: ICD-10-CM

## 2018-06-21 LAB
A/G RATIO: 1.4 (ref 1.1–2.2)
ALBUMIN SERPL-MCNC: 4.2 G/DL (ref 3.4–5)
ALP BLD-CCNC: 78 U/L (ref 40–129)
ALT SERPL-CCNC: 33 U/L (ref 10–40)
ANION GAP SERPL CALCULATED.3IONS-SCNC: 14 MMOL/L (ref 3–16)
AST SERPL-CCNC: 27 U/L (ref 15–37)
BILIRUB SERPL-MCNC: 0.5 MG/DL (ref 0–1)
BUN BLDV-MCNC: 17 MG/DL (ref 7–20)
CALCIUM SERPL-MCNC: 9.9 MG/DL (ref 8.3–10.6)
CHLORIDE BLD-SCNC: 103 MMOL/L (ref 99–110)
CHOLESTEROL, TOTAL: 158 MG/DL (ref 0–199)
CO2: 27 MMOL/L (ref 21–32)
CREAT SERPL-MCNC: 0.9 MG/DL (ref 0.6–1.2)
GFR AFRICAN AMERICAN: >60
GFR NON-AFRICAN AMERICAN: >60
GLOBULIN: 3.1 G/DL
GLUCOSE BLD-MCNC: 165 MG/DL (ref 70–99)
HBA1C MFR BLD: 7.5 %
HDLC SERPL-MCNC: 39 MG/DL (ref 40–60)
LDL CHOLESTEROL CALCULATED: 92 MG/DL
POTASSIUM SERPL-SCNC: 5 MMOL/L (ref 3.5–5.1)
SODIUM BLD-SCNC: 144 MMOL/L (ref 136–145)
TOTAL PROTEIN: 7.3 G/DL (ref 6.4–8.2)
TRIGL SERPL-MCNC: 137 MG/DL (ref 0–150)
TSH SERPL DL<=0.05 MIU/L-ACNC: 1.9 UIU/ML (ref 0.27–4.2)
VLDLC SERPL CALC-MCNC: 27 MG/DL

## 2018-06-21 PROCEDURE — 1036F TOBACCO NON-USER: CPT | Performed by: INTERNAL MEDICINE

## 2018-06-21 PROCEDURE — G8417 CALC BMI ABV UP PARAM F/U: HCPCS | Performed by: INTERNAL MEDICINE

## 2018-06-21 PROCEDURE — 3017F COLORECTAL CA SCREEN DOC REV: CPT | Performed by: INTERNAL MEDICINE

## 2018-06-21 PROCEDURE — G8427 DOCREV CUR MEDS BY ELIG CLIN: HCPCS | Performed by: INTERNAL MEDICINE

## 2018-06-21 PROCEDURE — 3045F PR MOST RECENT HEMOGLOBIN A1C LEVEL 7.0-9.0%: CPT | Performed by: INTERNAL MEDICINE

## 2018-06-21 PROCEDURE — 2022F DILAT RTA XM EVC RTNOPTHY: CPT | Performed by: INTERNAL MEDICINE

## 2018-06-21 PROCEDURE — 83036 HEMOGLOBIN GLYCOSYLATED A1C: CPT | Performed by: INTERNAL MEDICINE

## 2018-06-21 PROCEDURE — 36415 COLL VENOUS BLD VENIPUNCTURE: CPT | Performed by: INTERNAL MEDICINE

## 2018-06-21 PROCEDURE — 99214 OFFICE O/P EST MOD 30 MIN: CPT | Performed by: INTERNAL MEDICINE

## 2018-06-21 ASSESSMENT — ENCOUNTER SYMPTOMS
CHEST TIGHTNESS: 0
SHORTNESS OF BREATH: 0
VISUAL CHANGE: 0
COUGH: 0
ORTHOPNEA: 0
BLURRED VISION: 0

## 2018-06-29 ENCOUNTER — HOSPITAL ENCOUNTER (OUTPATIENT)
Dept: ULTRASOUND IMAGING | Age: 58
Discharge: HOME | End: 2018-06-29
Attending: INTERNAL MEDICINE | Admitting: INTERNAL MEDICINE

## 2018-06-29 DIAGNOSIS — E04.1 THYROID NODULE: ICD-10-CM

## 2018-06-29 DIAGNOSIS — E04.1 NONTOXIC SINGLE THYROID NODULE: ICD-10-CM

## 2018-07-25 ENCOUNTER — PATIENT MESSAGE (OUTPATIENT)
Dept: INTERNAL MEDICINE CLINIC | Age: 58
End: 2018-07-25

## 2018-07-25 DIAGNOSIS — M25.561 RIGHT KNEE PAIN, UNSPECIFIED CHRONICITY: Primary | ICD-10-CM

## 2018-08-01 ENCOUNTER — PATIENT MESSAGE (OUTPATIENT)
Dept: INTERNAL MEDICINE CLINIC | Age: 58
End: 2018-08-01

## 2018-08-01 DIAGNOSIS — M25.561 RIGHT KNEE PAIN, UNSPECIFIED CHRONICITY: Primary | ICD-10-CM

## 2018-08-02 ENCOUNTER — TELEPHONE (OUTPATIENT)
Dept: INTERNAL MEDICINE CLINIC | Age: 58
End: 2018-08-02

## 2018-08-02 NOTE — TELEPHONE ENCOUNTER
From: Pat Scott  To: Sudha Traylor MD  Sent: 8/1/2018 3:31 PM EDT  Subject: Non-Urgent Medical Question    Dr. Juve olmstead says they are not covered by my plan (despite [employer]'s website). I will need a referral to someone else. These docs are showing up on [employer]'s website: Mckenzie Kimball; Mel Sanchez; Luisito Lara; Tatyana Stern; Mami Osman. Is Dr. Alba Schmid with any of these gentlemen? Sorry for all the problems. Thank you,  Martha Sinclair  ----- Message -----  From: Johny Chi  Sent: 7/31/2018 10:54 AM EDT  To: Pat Scott  Subject: RE: Non-Urgent Medical Question  Yes there is    ----- Message -----   From: Pat Scott   Sent: 7/31/2018 10:00 AM EDT   To: Brigette Rasheed MD  Subject: RE: Non-Urgent Medical Question    ThanksLyubov. Just to be sure, however, there is one in place, correct? If not, my insurance won't cover it.      ----- Message -----  From: Johny Chi  Sent: 7/31/2018 9:11 AM EDT  To: Pat Scott  Subject: RE: Non-Urgent Medical Question  Marylou Mendoza should not need to have a referral in had to call to schedule with Dr. Britt Rivers. The phone number I have for his office is [phone].     ----- Message -----   From: Pat Scott   Sent: 7/30/2018 4:50 PM EDT   To: Brigette Rasheed MD  Subject: RE: Non-Urgent Medical Question    Hi Dr Lola Ricci,    Will I be receiving a referral via regular mail or e-mail? I usually get one in the office but since I'm not coming in. .. Just curious. Wanted to make this appointment as soon as possible. Thank you,  Manuel Vang  ----- Message -----  From: Brigette Rasheed MD  Sent: 7/25/2018 2:53 PM EDT  To: Pat Scott  Subject: RE: Non-Urgent Medical Question  So you do not need to see me. My suggestion for an orthopedic surgeon is Jumana Gonzalez. He is at [facility]. If he is not on your plan, let us know and we can get you some other names.      ----- Message -----   From: Pat Scott   Sent:

## 2018-08-02 NOTE — TELEPHONE ENCOUNTER
Cristhian Raygoza for order? Problem: Patient Care Overview  Goal: Plan of Care Review  Outcome: Ongoing (interventions implemented as appropriate)  Transfer orders to floor  Singer catheter removed  oob to chair  Oriented, following commands.  Goal: Individualization & Mutuality  Dx: LVAD    PMHx: CHF (EF=10%), IABP, HLD, HTN, Obesity, S/P implantation of automatic cardioverter/def, Hep B    PSHx:CARDIAC CATHETERIZATION, CARDIAC DEFIBRILLATOR PLACEMENT    7/27: LVAD; 1.5L albumin bolus, 1 u PRBC  7/28: to OR for chest closure, 1 u PRBC, 1L albumin, GLENN @ bedside  7/29: extubated    8/7:  2D Echo  8/8:transferred to CTSU  8/9: Rapid Response- abdominal pain/distension, SOB, hypotensive, elevated lactic, transferred to SICU at 0315, vomited became obtunded, emergently intubated, 2300cc blood tinged/brown OG drainage removed, lines placed, vaso, levo, epi, dopamine started. Nitric started. 2d Echo done. Overnight: 6 amps bicarb, 1 L albumin, 2 L NS, 1 PRBC given. Day shift: pan cx, repeat 2D echo, 2 amps bicarb, 1 L albumin, levo weaned off, nitric weaned, vent weaned  8/13: extubated   8/14: vaso off  8/15: CRRT  8/16: 250 mL albumin, 1 unit PRBCs  8/17: EGD- hemorrhagic mucosa in stomach/ ulceration- protonix gtt  8/22: 1 unit PRBC  8/24: OOBTC 6hrs.  Clear liquid diet started.   8/26: 1 unit PRBC    Nursing:  *MAP 60-80  *q4hr accuchecks  *q 6hr CBC & CMP  *1L fluid restriction  Day bath   Day LVAD dressing change     *Keep de-fib pads at bedside at all times, AICD does not work*                        Outcome: Ongoing (interventions implemented as appropriate)  Pt likes to drink orange juice

## 2018-08-08 ENCOUNTER — OFFICE VISIT (OUTPATIENT)
Dept: ORTHOPEDIC SURGERY | Age: 58
End: 2018-08-08

## 2018-08-08 VITALS
HEART RATE: 67 BPM | WEIGHT: 293 LBS | BODY MASS INDEX: 43.4 KG/M2 | DIASTOLIC BLOOD PRESSURE: 71 MMHG | SYSTOLIC BLOOD PRESSURE: 126 MMHG | HEIGHT: 69 IN

## 2018-08-08 DIAGNOSIS — M70.51 PES ANSERINUS BURSITIS OF RIGHT KNEE: Primary | ICD-10-CM

## 2018-08-08 DIAGNOSIS — Z96.651 S/P REVISION OF TOTAL KNEE, RIGHT: ICD-10-CM

## 2018-08-08 PROCEDURE — 1036F TOBACCO NON-USER: CPT | Performed by: ORTHOPAEDIC SURGERY

## 2018-08-08 PROCEDURE — G8427 DOCREV CUR MEDS BY ELIG CLIN: HCPCS | Performed by: ORTHOPAEDIC SURGERY

## 2018-08-08 PROCEDURE — G8417 CALC BMI ABV UP PARAM F/U: HCPCS | Performed by: ORTHOPAEDIC SURGERY

## 2018-08-08 PROCEDURE — 3017F COLORECTAL CA SCREEN DOC REV: CPT | Performed by: ORTHOPAEDIC SURGERY

## 2018-08-08 PROCEDURE — 20610 DRAIN/INJ JOINT/BURSA W/O US: CPT | Performed by: ORTHOPAEDIC SURGERY

## 2018-08-08 PROCEDURE — 99213 OFFICE O/P EST LOW 20 MIN: CPT | Performed by: ORTHOPAEDIC SURGERY

## 2018-08-08 NOTE — PROGRESS NOTES
INITIAL EVALUATION OF KNEE                                                                    8/8/2018  Dayton Lundberg     1960       History of Present Illness:    Thierry Eddy is seen for Knee Pain (Right knee)    She returns for reevaluation of a painful right total knee replacement. She underwent her primary knee replacement in 2004. Within one year she underwent an open arthrotomy for lysis of adhesions. This improved the knee for several more years however by 2012 she returned to the OR for revision for aseptic loosening of the tibial component and underwent a long stem tibial revision. She actually did well from this particular procedure for about 3 more years but had return of her pain over the last 2-3 years. She was treated with an injection of steroids into her pes bursa approximately a year ago while still living in [place]. She then saw my partner Dr. Mya Salas  in November 2017. Weight loss was recommended. She complains of an intermittent achy pain primarily along the medial aspect of her right knee. There is no pain at rest but pain up to 10 with activities such as walking and standing. She denies any numbness or weakness. She is limited in ambulatory distances to 10 minutes. She states the symptoms are worsening. Pertinent items are noted in HPI  Review of systems reviewed from Patient History Form dated on 11/7/17 and available in the patient's chart under the Media tab. The patient's past medical history, medications, allergies, family history, social history, HPI have been reviewed, dated, and recorded in the chart.      /71   Pulse 67   Ht 5' 9\" (1.753 m)   Wt (!) 336 lb (152.4 kg)   LMP  (LMP Unknown)   BMI 49.62 kg/m²     Physical examination:    General Appearance: no acute distress, alert, oriented x 3  Limps when walking: yes - moderate limp alcohol, anesthetized with 2 cc of 1% Lidoocaine then injected with 2 cc of 40 mg Depomedrol into the pes bursa of the right knee - an extra-articular location- after sufficient time for analgesia was allowed. 2. Weight loss was again recommended. 3. Physical therapy was recommended and she agreed to proceed. 4. If she is not better within 6 weeks, she will return for repeat examination. CRP and sedimentation rate may be indicated. We may consider obtaining CT scan and/or bone scan for further evaluation at that time. Daren Rodriguez MD  8/8/2018    This dictation was done with Dragon dictation and may contain mechanical errors related to translation.     Depo-Medrol:  NDC: [id number]  Lot #: [id number]  Expiration Date: 08/2020

## 2018-08-09 ENCOUNTER — TELEPHONE (OUTPATIENT)
Dept: INTERNAL MEDICINE CLINIC | Age: 58
End: 2018-08-09

## 2018-08-14 ENCOUNTER — HOSPITAL ENCOUNTER (OUTPATIENT)
Dept: OTHER | Age: 58
Discharge: HOME | End: 2018-08-31
Attending: ORTHOPAEDIC SURGERY | Admitting: ORTHOPAEDIC SURGERY

## 2018-08-14 NOTE — PLAN OF CARE
Outpatient Physical Therapy  [x] [facility]    Phone: [phone]   Fax: [phone]   [] [facility]  Phone: [phone]              Fax: [phone]  [] [facility]   Phone: [phone]   Fax: [phone]     To: Referring Practitioner: Pat Scott      Patient: Pat Scott   : 1960   MRN: [id number]  Evaluation Date: 2018      Diagnosis Information:  · Diagnosis: Right knee pain. ·   Decreased functional mobility 2/2 knee pain    Physical Therapy Certification  Dear Dr. Pat Scott  The following patient has been evaluated for physical therapy services and for therapy to continue, Medicare requires monthly physician review of the treatment plan. Please review the attached evaluation and/or summary of the patient's plan of care, and verify that you agree therapy should continue by signing the attached document and sending it back to our office. Plan of Care/Treatment to date:  [x] Therapeutic Exercise    [x] Modalities:  [x] Therapeutic Activity     [] Ultrasound  [] Electrical Stimulation  [x] Gait Training      [] Cervical Traction [] Lumbar Traction  [] Neuromuscular Re-education    [] Cold/hotpack [] Iontophoresis   [] Instruction in HEP     Other:  [x] Manual Therapy      []             [x] Aquatic Therapy      []           ? Frequency/Duration:  # Days per week: [] 1 day # Weeks: [] 1 week [] 5 weeks     [x] 2 days? [] 2 weeks [] 6 weeks     [] 3 days   [] 3 weeks [] 7 weeks     [] 4 days   [] 4 weeks [x] 8 weeks    Rehab Potential: [] Excellent [] Good [] Fair  [] Poor       Electronically signed by:  Lakeisha Pope, PT  [id number]      If you have any questions or concerns, please don't hesitate to call.   Thank you for your referral.      Physician Signature:________________________________Date:__________________  By signing above, therapists plan is approved by physician

## 2018-08-14 NOTE — PROGRESS NOTES
Physical Therapy  Initial Assessment  Date: 2018  Patient Name: Shawn Gambino  MRN: [id number]  : 1960     Treatment Diagnosis: Decreased functional mobility 2/2 right knee pain    Restrictions  Position Activity Restriction  Other position/activity restrictions: no significant fall risk    Subjective   General  Chart Reviewed: Yes  Additional Pertinent Hx: asthma, DDd, depression, Dm GERD, HLD, HTN, hypothyroid, obesity, pulmonary hTN, sleep apnea, thyroid nodule, DM2  Vit d  Referring Practitioner: Shawn Maki  Referral Date : 18  Diagnosis: Right knee pain. PT Visit Information  Onset Date: 08/14/15  PT Insurance Information: [employer]  Subjective  Subjective: Right TKR in , lysis of adhesions ,   Increased pain over the las 2-3 years,  injection of steroids was helpful. Intermittent ache medial knee, increased with activities. Had recent steroid injection 18  Pain Screening  Patient Currently in Pain: Yes (7-10 with gait prior to steroid shot. Currently 0-1, but has not stressed it.  )  Vital Signs  Patient Currently in Pain: Yes (7-10 with gait prior to steroid shot.   Currently 0-1, but has not stressed it.  )    Vision/Hearing  Vision  Vision: Within Functional Limits  Hearing  Hearing: Within functional limits    Orientation  Orientation  Overall Orientation Status: Within Normal Limits    Social/Functional History  Social/Functional History  Lives With: Alone  Type of Home: Apartment (condo)  Home Layout: One level  Objective     Observation/Palpation  Posture: Good  Palpation: no specific TTP RLE  Observation: 5'9\"  336#  Edema: none noted    AROM RLE (degrees)  RLE General AROM: 20-80 supine  AROM LLE (degrees)  LLE AROM : WFL    Strength RLE  Strength RLE: WFL  Strength LLE  Strength LLE: WFL     Additional Measures  Flexibility: mod limited HS/GSS  Special Tests: min limited patellar mobility  Sensation  Overall Sensation Status: [facility]           Balance  Sitting - Static: Good  Sitting - Dynamic: Good  Standing - Static: Good  Standing - Dynamic: Good        Assessment   Conditions Requiring Skilled Therapeutic Intervention  Body structures, Functions, Activity limitations: Decreased functional mobility ; Decreased ROM; Decreased strength;Decreased endurance  Assessment: Pt has several year history of pain following right knee revision . Prior function - indep, walked for exercise,  Current function - limited activity, gait   Treatment Diagnosis: Decreased functional mobility 2/2 right knee pain  Prognosis: Good  Decision Making: Medium Complexity  Patient Education: role of PT, aquatic therapy  Barriers to Learning: none noted  REQUIRES PT FOLLOW UP: Yes  Treatment Initiated : POC  Activity Tolerance  Activity Tolerance: Patient Tolerated treatment well         Plan   Plan  Times per week: 2x/week for 8 weeks  Current Treatment Recommendations: Strengthening, ROM, Functional Mobility Training, Aquatics, Home Exercise Program    G-Code  PT G-Codes  Functional Assessment Tool Used: LEFs  Score: 25   60-79%  Functional Limitation: Mobility: Walking and moving around  Mobility: Walking and Moving Around Current Status (): At least 60 percent but less than 80 percent impaired, limited or restricted  Mobility: Walking and Moving Around Goal Status (): At least 40 percent but less than 60 percent impaired, limited or restricted    OutComes Score  LEFS Total Score: 25                                        Goals  Short term goals  Time Frame for Short term goals: 2 weeks  Short term goal 1: Progress to 45 min of aquatic therapy  Long term goals  Time Frame for Long term goals : discharge  Long term goal 1: Decrease right knee pain to occasional 2-3 during gait and exercise  Long term goal 2:  Increase right knee ROM 10-90 for improved functional mobility with self care and in and out of car  Long term goal 3: iincreae strength to 4+/5 for improved gait on steps and getting up and down from chairs. Patient Goals   Patient goals :  \"To get back to walking again for exercise\"           Timed Code Treatment Minutes:   30    Total Treatment Minutes:  [place], [id number]

## 2018-08-14 NOTE — FLOWSHEET NOTE
issued written HEP. Timed Code Treatment Minutes: Total Treatment Minutes:      Treatment/Activity Tolerance:  [] Patient tolerated treatment well [] Patient limited by fatigue  [] Patient limited by pain  [] Patient limited by other medical complications  [] Other:     Prognosis: [x] Good [] Fair  [] Poor    Goals:    Short term goals  Time Frame for Short term goals: 2 weeks  Short term goal 1: Progress to 45 min of aquatic therapy      Long term goals  Time Frame for Long term goals : discharge  Long term goal 1: Decrease right knee pain to occasional 2-3 during gait and exercise  Long term goal 2: Increase right knee ROM 10-90 for improved functional mobility with self care and in and out of car  Long term goal 3: iincreae strength to 4+/5 for improved gait on steps and getting up and down from chairs.      Patient Requires Follow-up: [x] Yes  [] No    Plan:   [] Continue per plan of care [] Alter current plan (see comments)  [x] Plan of care initiated [] Hold pending MD visit [] Discharge    Plan for Next Session:  Add aquatic therapy    Electronically signed by:  Stephanie Oconnor, [address]

## 2018-08-16 ENCOUNTER — HOSPITAL ENCOUNTER (OUTPATIENT)
Dept: PHYSICAL THERAPY | Age: 58
Discharge: HOME | End: 2018-08-17
Admitting: ORTHOPAEDIC SURGERY

## 2018-08-16 NOTE — FLOWSHEET NOTE
Tband lats    Post Shoulder  Lumbar Rot w/ paddles    Pec   Small ball pull downs                   diagonals             Cervical:       AROM Flex       AROM Extension     LEs exercises:   AROM Side Bending    Marching 10x  AROM Rotation    Heel Raises 10x  Chin tucks    Toe Raises 10x  Chin nods     Squats 10x       Hamstring Curls 10x       Hip Flexion 10x  Balance: Hip Abduction 10x SLS    Hip Circles 10x Tandem stance x30 sec L/R   TA set 10x NBOS eyes open    Glut Set 10x NBOS eyes closed    Hip Extension  Hand to Opposite Knee    Hip Adduction    Box Step     Hip IR   Noodle Stance     Hip ER  Stop/Go Gait    Fig 8's  Switch Gait                Seated:  Functional:    Ankle Pumps 10x Step up forward    Ankle circles  Step up lateral    Knee flex & ext 10x Step down    Hip Abd & Adduction 10x Shallow [place] squats    Bicycle  10x Crate Lifts    Add Set with ball 10x Lunges forward    LX stab with med ball throws  Lunges lateral    Ankle INV  Lunges retro    Ankle EV  Lower ab curl with noodle      Upper ab curl with ball      Med ball straight lifts      Med ball diagonal lifts      Hydrorider          Noodle:      Leg Press  Deep Water:    Noodle hang at wall  Jog    Noodle hang deep water  Jumping Jacks    Noodle Bicycle  Heel to toe      Hand to opposite knee      Cross country skier      Rocking Horse        Timed Code Treatment Minutes:  30    Total Treatment Minutes:  30    Treatment/Activity Tolerance:  [x] Patient tolerated treatment well [] Patient limited by fatique  [] Patient limited by pain  [] Patient limited by other medical complications  [] Other:     Prognosis: [x] Good [] Fair  [] Poor    Patient Requires Follow-up: [x] Yes  [] No    Plan:   [x] Continue per plan of care [] Alter current plan (see comments)  [] Plan of care initiated [] Hold pending MD visit [] Discharge    Plan for Next Session:  Add step ups. Increase gait and chair.      Electronically signed by: Mohinder Steele, PT

## 2018-08-21 ENCOUNTER — HOSPITAL ENCOUNTER (OUTPATIENT)
Dept: PHYSICAL THERAPY | Age: 58
Discharge: HOME | End: 2018-08-22
Admitting: ORTHOPAEDIC SURGERY

## 2018-08-23 ENCOUNTER — HOSPITAL ENCOUNTER (OUTPATIENT)
Dept: PHYSICAL THERAPY | Age: 58
Discharge: HOME | End: 2018-08-24
Admitting: ORTHOPAEDIC SURGERY

## 2018-08-23 NOTE — FLOWSHEET NOTE
Push-ups    Quad  Figure 8's    Mid back   Buoy pull/push downs    UT  Tband rows    Rhom  Tband lats    Post Shoulder  Lumbar Rot w/ paddles    Pec   Small ball pull downs                   diagonals             Cervical:       AROM Flex       AROM Extension     LEs exercises:   AROM Side Bending    Marching 10x  AROM Rotation    Heel Raises 10x  Chin tucks    Toe Raises 10x  Chin nods     Squats 10x       Hamstring Curls 10x       Hip Flexion 10x  Balance: Hip Abduction 10x SLS    Hip Circles 10x Tandem stance x30 sec L/R   TA set 10x NBOS eyes open    Glut Set 10x NBOS eyes closed    Hip Extension  Hand to Opposite Knee    Hip Adduction    Box Step     Hip IR   Noodle Stance     Hip ER  Stop/Go Gait    Fig 8's  Switch Gait                Seated:  Functional:    Ankle Pumps 15x Step up forward 10x L/R   Ankle circles  Step up lateral 10x L/R   Knee flex & ext 15x Step down 10x L/R   Hip Abd & Adduction 15x Shallow [place] squats    Bicycle  15x Crate Lifts    Add Set with ball 10x Lunges forward    LX stab with med ball throws  Lunges lateral    Ankle INV  Lunges retro    Ankle EV  Lower ab curl with noodle      Upper ab curl with ball      Med ball straight lifts      Med ball diagonal lifts      Hydrorider          Noodle:      Leg Press Small 20x L/R Deep Water:    Noodle hang at wall  Jog    Noodle hang deep water  Jumping Jacks    Noodle Bicycle  Heel to toe      Hand to opposite knee      Cross country skier      Rocking Horse        Timed Code Treatment Minutes:  40    Total Treatment Minutes:  40    Treatment/Activity Tolerance:  [x] Patient tolerated treatment well [] Patient limited by fatique  [] Patient limited by pain  [] Patient limited by other medical complications  [] Other:     Prognosis: [x] Good [] Fair  [] Poor    Patient Education: Patient instructed in HEP of SLR flex and SL abd.     Patient Requires Follow-up: [x] Yes  [] No    Plan:   [x] Continue per plan of care [] Alter current plan

## 2018-08-27 DIAGNOSIS — E11.9 TYPE 2 DIABETES MELLITUS WITHOUT COMPLICATION, WITHOUT LONG-TERM CURRENT USE OF INSULIN (HCC): ICD-10-CM

## 2018-08-27 RX ORDER — EMPAGLIFLOZIN 10 MG/1
TABLET, FILM COATED ORAL
Qty: 30 TABLET | Refills: 12 | Status: SHIPPED | OUTPATIENT
Start: 2018-08-27 | End: 2019-04-17

## 2018-08-28 ENCOUNTER — HOSPITAL ENCOUNTER (OUTPATIENT)
Dept: PHYSICAL THERAPY | Age: 58
Discharge: HOME | End: 2018-08-29
Admitting: ORTHOPAEDIC SURGERY

## 2018-08-28 NOTE — FLOWSHEET NOTE
UT  Tband rows    Rhom  Tband lats    Post Shoulder  Lumbar Rot w/ paddles    Pec   Small ball pull downs                   diagonals             Cervical:       AROM Flex       AROM Extension     LEs exercises:   AROM Side Bending    Marching 10x  AROM Rotation    Heel Raises 10x  Chin tucks    Toe Raises 10x  Chin nods     Squats 10x       Hamstring Curls 10x       Hip Flexion 10x  Balance: Hip Abduction 10x SLS x30 sec L/R   Hip Circles 10x Tandem stance x30 sec L/R   TA set 10x NBOS eyes open    Glut Set 10x NBOS eyes closed    Hip Extension  Hand to Opposite Knee    Hip Adduction    Box Step 3x L/R    Hip IR   Noodle Stance     Hip ER  Stop/Go Gait    Fig 8's  Switch Gait                Seated:  Functional:    Ankle Pumps 15x Step up forward 10x L/R   Ankle circles  Step up lateral 10x L/R   Knee flex & ext 15x Step down 10x L/R   Hip Abd & Adduction 15x Shallow [place] squats    Bicycle  15x Crate Lifts    Add Set with ball 10x Lunges forward    LX stab with med ball throws  Lunges lateral    Ankle INV  Lunges retro    Ankle EV  Lower ab curl with noodle      Upper ab curl with ball      Med ball straight lifts      Med ball diagonal lifts      Hydrorider          Noodle:      Leg Press Small 20x L/R Deep Water:    Noodle hang at wall  Jog    Noodle hang deep water  Jumping Jacks    Noodle Bicycle  Heel to toe      Hand to opposite knee      Cross country skier      Rocking Horse        Timed Code Treatment Minutes:  40    Total Treatment Minutes:  40    Treatment/Activity Tolerance:  [x] Patient tolerated treatment well [] Patient limited by fatique  [] Patient limited by pain  [] Patient limited by other medical complications  [] Other:     Prognosis: [x] Good [] Fair  [] Poor    Patient Education: Patient instructed in HEP of SLR flex and SL abd.     Patient Requires Follow-up: [x] Yes  [] No    Plan:   [x] Continue per plan of care [] Alter current plan (see comments)  [] Plan of care initiated [] Hold pending MD visit [] Discharge    Plan for Next Session:   Add marching gait.      Electronically signed by: Peace Frye, [address]

## 2018-08-30 ENCOUNTER — HOSPITAL ENCOUNTER (OUTPATIENT)
Dept: PHYSICAL THERAPY | Age: 58
Discharge: HOME | End: 2018-08-31
Admitting: ORTHOPAEDIC SURGERY

## 2018-09-01 ENCOUNTER — HOSPITAL ENCOUNTER (OUTPATIENT)
Dept: OTHER | Age: 58
Discharge: HOME | End: 2018-09-01
Attending: ORTHOPAEDIC SURGERY | Admitting: ORTHOPAEDIC SURGERY

## 2018-09-06 ENCOUNTER — HOSPITAL ENCOUNTER (OUTPATIENT)
Dept: PHYSICAL THERAPY | Age: 58
Discharge: HOME | End: 2018-09-07
Admitting: ORTHOPAEDIC SURGERY

## 2018-10-11 ENCOUNTER — OFFICE VISIT (OUTPATIENT)
Dept: INTERNAL MEDICINE CLINIC | Age: 58
End: 2018-10-11
Payer: COMMERCIAL

## 2018-10-11 VITALS
HEIGHT: 70 IN | HEART RATE: 69 BPM | OXYGEN SATURATION: 98 % | RESPIRATION RATE: 16 BRPM | DIASTOLIC BLOOD PRESSURE: 80 MMHG | WEIGHT: 293 LBS | BODY MASS INDEX: 41.95 KG/M2 | SYSTOLIC BLOOD PRESSURE: 116 MMHG

## 2018-10-11 DIAGNOSIS — K21.9 GASTROESOPHAGEAL REFLUX DISEASE, ESOPHAGITIS PRESENCE NOT SPECIFIED: ICD-10-CM

## 2018-10-11 DIAGNOSIS — E11.319 DIABETIC RETINOPATHY OF RIGHT EYE ASSOCIATED WITH TYPE 2 DIABETES MELLITUS, MACULAR EDEMA PRESENCE UNSPECIFIED, UNSPECIFIED RETINOPATHY SEVERITY (HCC): ICD-10-CM

## 2018-10-11 DIAGNOSIS — F50.89 COMPULSIVE OVEREATER: ICD-10-CM

## 2018-10-11 DIAGNOSIS — I10 ESSENTIAL HYPERTENSION: ICD-10-CM

## 2018-10-11 DIAGNOSIS — F32.9 REACTIVE DEPRESSION: ICD-10-CM

## 2018-10-11 DIAGNOSIS — E03.4 HYPOTHYROIDISM DUE TO ACQUIRED ATROPHY OF THYROID: ICD-10-CM

## 2018-10-11 DIAGNOSIS — G47.33 OBSTRUCTIVE SLEEP APNEA SYNDROME: ICD-10-CM

## 2018-10-11 DIAGNOSIS — I27.20 PULMONARY HTN (HCC): ICD-10-CM

## 2018-10-11 DIAGNOSIS — E78.2 MIXED HYPERLIPIDEMIA: ICD-10-CM

## 2018-10-11 DIAGNOSIS — E11.9 TYPE 2 DIABETES MELLITUS WITHOUT COMPLICATION, WITHOUT LONG-TERM CURRENT USE OF INSULIN (HCC): Primary | ICD-10-CM

## 2018-10-11 LAB — HBA1C MFR BLD: 7.1 %

## 2018-10-11 PROCEDURE — G8427 DOCREV CUR MEDS BY ELIG CLIN: HCPCS | Performed by: INTERNAL MEDICINE

## 2018-10-11 PROCEDURE — 3045F PR MOST RECENT HEMOGLOBIN A1C LEVEL 7.0-9.0%: CPT | Performed by: INTERNAL MEDICINE

## 2018-10-11 PROCEDURE — G8484 FLU IMMUNIZE NO ADMIN: HCPCS | Performed by: INTERNAL MEDICINE

## 2018-10-11 PROCEDURE — 1036F TOBACCO NON-USER: CPT | Performed by: INTERNAL MEDICINE

## 2018-10-11 PROCEDURE — G8417 CALC BMI ABV UP PARAM F/U: HCPCS | Performed by: INTERNAL MEDICINE

## 2018-10-11 PROCEDURE — 3017F COLORECTAL CA SCREEN DOC REV: CPT | Performed by: INTERNAL MEDICINE

## 2018-10-11 PROCEDURE — 2022F DILAT RTA XM EVC RTNOPTHY: CPT | Performed by: INTERNAL MEDICINE

## 2018-10-11 PROCEDURE — 83036 HEMOGLOBIN GLYCOSYLATED A1C: CPT | Performed by: INTERNAL MEDICINE

## 2018-10-11 PROCEDURE — 99214 OFFICE O/P EST MOD 30 MIN: CPT | Performed by: INTERNAL MEDICINE

## 2018-10-11 ASSESSMENT — ENCOUNTER SYMPTOMS
ORTHOPNEA: 0
CHEST TIGHTNESS: 0
BLURRED VISION: 0
VISUAL CHANGE: 0
SHORTNESS OF BREATH: 0
COUGH: 0

## 2018-10-11 NOTE — PROGRESS NOTES
Prior to Visit Medications    Medication Sig Taking?  Authorizing Provider   JARDIANCE 10 MG tablet TAKE ONE TABLET BY MOUTH DAILY Yes Priscilla Magana MD   venlafaxine (EFFEXOR) 75 MG tablet TAKE 1 TABLET DAILY Yes Priscilla Magana MD   metFORMIN (GLUCOPHAGE) 500 MG tablet Take 1 tablet by mouth 2 times daily (with meals) Yes Priscilla Magana MD   glipiZIDE (GLUCOTROL) 5 MG tablet Take 2 tablets by mouth 2 times daily (before meals) Yes Priscilal Magana MD   montelukast (SINGULAIR) 10 MG tablet Take 1 tablet by mouth nightly Yes Priscilla Magana MD   sodium chloride (ALTAMIST SPRAY) 0.65 % nasal spray 1 spray by Nasal route as needed for Congestion Yes RAUL Kohli - CNP   Ascorbic Acid (VITAMIN C) 250 MG tablet Take 4 tablets by mouth daily Yes Priscilla Magana MD   fluticasone-salmeterol (ADVAIR) 100-50 MCG/DOSE diskus inhaler Inhale 1 puff into the lungs nightly Yes Priscilla Magana MD   fluticasone (FLONASE) 50 MCG/ACT nasal spray 2 sprays by Nasal route nightly Yes Priscilla Magana MD   cetirizine (ZYRTEC) 10 MG tablet Take 1 tablet by mouth daily Yes Priscilla Magana MD   vitamin D (CHOLECALCIFEROL) 1000 units TABS tablet Take 1 tablet by mouth 2 times daily Yes Priscilla Magana MD   Flaxseed, Linseed, (FLAXSEED OIL MAX STR) 1300 MG CAPS Take 1 tablet by mouth daily Yes Priscilla Magana MD   Cinnamon 500 MG CAPS Take 1 capsule by mouth 2 times daily Yes Priscilla Magana MD   gelatin 650 MG capsule Take 1 capsule by mouth 2 times daily Yes Priscilla Magana MD   Multiple Vitamins-Minerals (VISION VITAMINS) TABS Take 1 tablet by mouth daily Yes Priscilla Magana MD   Turmeric Curcumin 500 MG CAPS Take 1 tablet by mouth 2 times daily Yes Priscilla Magana MD   B Complex-Folic Acid (SUPER B COMPLEX MAXI) TABS Take 1 tablet by mouth daily Yes Priscilla Magana MD   Magnesium 400 MG TABS Take 1 tablet by mouth daily Yes Priscilla Magana MD   psyllium 0.52 g capsule Take 1 capsule by mouth 4 times daily Yes Sudheer Gonzales MD   atorvastatin (LIPITOR) 10 MG tablet Take 1 tablet by mouth daily  Patient taking differently: Take 10 mg by mouth daily Every other day Yes Sudheer Gonzales MD   lisinopril (PRINIVIL;ZESTRIL) 10 MG tablet Take 1 tablet by mouth daily Yes Jermaine Butler MD   levothyroxine (SYNTHROID) 137 MCG tablet Take 1 tablet by mouth daily Yes Sudheer Gonzales MD   aspirin 81 MG chewable tablet Take 81 mg by mouth daily Yes Historical Provider, MD   Omega-3 Fatty Acids (FISH OIL CONCENTRATE PO) Take by mouth Yes Historical Provider, MD   Multiple Vitamins-Minerals (THERAPEUTIC MULTIVITAMIN-MINERALS) tablet Take 1 tablet by mouth daily Yes Historical Provider, MD   Calcium Acetate, Phos Binder, (CALCIUM ACETATE PO) Take by mouth Yes Historical Provider, MD       /80 (Site: Right Upper Arm, Position: Sitting, Cuff Size: Large Adult)   Pulse 69   Resp 16   Ht 5' 9.5\" (1.765 m)   Wt (!) 335 lb (152 kg)   LMP  (LMP Unknown)   SpO2 98%   BMI 48.76 kg/m²     Objective:   Physical Exam   Constitutional: She is oriented to person, place, and time. She appears well-developed and well-nourished. No distress. Eyes: Pupils are equal, round, and reactive to light. Neck: No JVD present. No bruits   Cardiovascular: Normal rate, regular rhythm and normal heart sounds. Exam reveals no gallop and no friction rub. No murmur heard. Pulmonary/Chest: Effort normal and breath sounds normal. No respiratory distress. She has no wheezes. She has no rales. Musculoskeletal: She exhibits edema. Trace edema   Neurological: She is alert and oriented to person, place, and time. Skin: Skin is warm and dry. She is not diaphoretic. No erythema. Psychiatric: She has a normal mood and affect. Her behavior is normal. Judgment and thought content normal.   Vitals reviewed. Assessment:      1.  Type 2 diabetes mellitus without complication, without long-term

## 2018-10-28 DIAGNOSIS — E03.4 HYPOTHYROIDISM DUE TO ACQUIRED ATROPHY OF THYROID: ICD-10-CM

## 2018-10-29 RX ORDER — LEVOTHYROXINE SODIUM 137 UG/1
TABLET ORAL
Qty: 30 TABLET | Refills: 12 | Status: SHIPPED | OUTPATIENT
Start: 2018-10-29 | End: 2019-11-25 | Stop reason: SDUPTHER

## 2018-12-19 ENCOUNTER — HOSPITAL ENCOUNTER (OUTPATIENT)
Dept: WOMENS IMAGING | Age: 58
Discharge: HOME | End: 2018-12-19
Payer: COMMERCIAL

## 2018-12-19 DIAGNOSIS — Z12.31 VISIT FOR SCREENING MAMMOGRAM: ICD-10-CM

## 2018-12-19 PROCEDURE — 77063 BREAST TOMOSYNTHESIS BI: CPT

## 2019-01-14 ENCOUNTER — OFFICE VISIT (OUTPATIENT)
Dept: INTERNAL MEDICINE CLINIC | Age: 59
End: 2019-01-14
Payer: COMMERCIAL

## 2019-01-14 VITALS
SYSTOLIC BLOOD PRESSURE: 120 MMHG | DIASTOLIC BLOOD PRESSURE: 86 MMHG | RESPIRATION RATE: 16 BRPM | BODY MASS INDEX: 43.4 KG/M2 | HEIGHT: 69 IN | WEIGHT: 293 LBS | HEART RATE: 88 BPM

## 2019-01-14 DIAGNOSIS — K21.9 GASTROESOPHAGEAL REFLUX DISEASE, ESOPHAGITIS PRESENCE NOT SPECIFIED: ICD-10-CM

## 2019-01-14 DIAGNOSIS — F50.89 COMPULSIVE OVEREATER: ICD-10-CM

## 2019-01-14 DIAGNOSIS — I10 ESSENTIAL HYPERTENSION: ICD-10-CM

## 2019-01-14 DIAGNOSIS — G89.29 CHRONIC PAIN OF BOTH SHOULDERS: ICD-10-CM

## 2019-01-14 DIAGNOSIS — M25.512 CHRONIC PAIN OF BOTH SHOULDERS: ICD-10-CM

## 2019-01-14 DIAGNOSIS — M25.511 CHRONIC PAIN OF BOTH SHOULDERS: ICD-10-CM

## 2019-01-14 DIAGNOSIS — E11.319 DIABETIC RETINOPATHY OF RIGHT EYE ASSOCIATED WITH TYPE 2 DIABETES MELLITUS, MACULAR EDEMA PRESENCE UNSPECIFIED, UNSPECIFIED RETINOPATHY SEVERITY (HCC): ICD-10-CM

## 2019-01-14 DIAGNOSIS — I27.20 PULMONARY HTN (HCC): ICD-10-CM

## 2019-01-14 DIAGNOSIS — G47.33 OBSTRUCTIVE SLEEP APNEA SYNDROME: ICD-10-CM

## 2019-01-14 DIAGNOSIS — E03.4 HYPOTHYROIDISM DUE TO ACQUIRED ATROPHY OF THYROID: ICD-10-CM

## 2019-01-14 DIAGNOSIS — E78.2 MIXED HYPERLIPIDEMIA: ICD-10-CM

## 2019-01-14 DIAGNOSIS — E11.9 TYPE 2 DIABETES MELLITUS WITHOUT COMPLICATION, WITHOUT LONG-TERM CURRENT USE OF INSULIN (HCC): Primary | ICD-10-CM

## 2019-01-14 DIAGNOSIS — F32.9 REACTIVE DEPRESSION: ICD-10-CM

## 2019-01-14 LAB
A/G RATIO: 1.5 (ref 1.1–2.2)
ALBUMIN SERPL-MCNC: 4.2 G/DL (ref 3.4–5)
ALP BLD-CCNC: 82 U/L (ref 40–129)
ALT SERPL-CCNC: 29 U/L (ref 10–40)
ANION GAP SERPL CALCULATED.3IONS-SCNC: 17 MMOL/L (ref 3–16)
AST SERPL-CCNC: 22 U/L (ref 15–37)
BILIRUB SERPL-MCNC: 0.4 MG/DL (ref 0–1)
BUN BLDV-MCNC: 18 MG/DL (ref 7–20)
CALCIUM SERPL-MCNC: 9.4 MG/DL (ref 8.3–10.6)
CHLORIDE BLD-SCNC: 102 MMOL/L (ref 99–110)
CHOLESTEROL, TOTAL: 137 MG/DL (ref 0–199)
CO2: 25 MMOL/L (ref 21–32)
CREAT SERPL-MCNC: 0.8 MG/DL (ref 0.6–1.2)
CREATININE URINE: 105.4 MG/DL (ref 28–259)
GFR AFRICAN AMERICAN: >60
GFR NON-AFRICAN AMERICAN: >60
GLOBULIN: 2.8 G/DL
GLUCOSE BLD-MCNC: 145 MG/DL (ref 70–99)
HBA1C MFR BLD: 6.7 %
HDLC SERPL-MCNC: 41 MG/DL (ref 40–60)
LDL CHOLESTEROL CALCULATED: 73 MG/DL
MICROALBUMIN UR-MCNC: <1.2 MG/DL
MICROALBUMIN/CREAT UR-RTO: NORMAL MG/G (ref 0–30)
POTASSIUM SERPL-SCNC: 4.8 MMOL/L (ref 3.5–5.1)
SODIUM BLD-SCNC: 144 MMOL/L (ref 136–145)
TOTAL PROTEIN: 7 G/DL (ref 6.4–8.2)
TRIGL SERPL-MCNC: 114 MG/DL (ref 0–150)
TSH SERPL DL<=0.05 MIU/L-ACNC: 0.77 UIU/ML (ref 0.27–4.2)
VLDLC SERPL CALC-MCNC: 23 MG/DL

## 2019-01-14 PROCEDURE — 99214 OFFICE O/P EST MOD 30 MIN: CPT | Performed by: INTERNAL MEDICINE

## 2019-01-14 PROCEDURE — 83036 HEMOGLOBIN GLYCOSYLATED A1C: CPT | Performed by: INTERNAL MEDICINE

## 2019-01-14 PROCEDURE — 36415 COLL VENOUS BLD VENIPUNCTURE: CPT | Performed by: INTERNAL MEDICINE

## 2019-01-14 ASSESSMENT — ENCOUNTER SYMPTOMS
COUGH: 0
ORTHOPNEA: 0
BLURRED VISION: 0
VISUAL CHANGE: 0
SHORTNESS OF BREATH: 0
CHEST TIGHTNESS: 0

## 2019-02-01 ENCOUNTER — TELEPHONE (OUTPATIENT)
Dept: INTERNAL MEDICINE CLINIC | Age: 59
End: 2019-02-01

## 2019-02-05 ENCOUNTER — TELEPHONE (OUTPATIENT)
Dept: INTERNAL MEDICINE CLINIC | Age: 59
End: 2019-02-05

## 2019-03-11 RX ORDER — LISINOPRIL 10 MG/1
10 TABLET ORAL DAILY
Qty: 30 TABLET | Refills: 5 | Status: SHIPPED | OUTPATIENT
Start: 2019-03-11 | End: 2019-04-17

## 2019-03-11 RX ORDER — MONTELUKAST SODIUM 10 MG/1
10 TABLET ORAL NIGHTLY
Qty: 90 TABLET | Refills: 3 | Status: SHIPPED | OUTPATIENT
Start: 2019-03-11 | End: 2020-02-24 | Stop reason: SDUPTHER

## 2019-04-16 RX ORDER — VENLAFAXINE 75 MG/1
75 TABLET ORAL DAILY
Qty: 90 TABLET | Refills: 3 | Status: SHIPPED | OUTPATIENT
Start: 2019-04-16 | End: 2020-04-14 | Stop reason: SDUPTHER

## 2019-04-17 ENCOUNTER — OFFICE VISIT (OUTPATIENT)
Dept: INTERNAL MEDICINE CLINIC | Age: 59
End: 2019-04-17
Payer: COMMERCIAL

## 2019-04-17 VITALS
SYSTOLIC BLOOD PRESSURE: 118 MMHG | TEMPERATURE: 97.9 F | BODY MASS INDEX: 43.4 KG/M2 | HEIGHT: 69 IN | RESPIRATION RATE: 12 BRPM | OXYGEN SATURATION: 94 % | HEART RATE: 62 BPM | DIASTOLIC BLOOD PRESSURE: 81 MMHG | WEIGHT: 293 LBS

## 2019-04-17 DIAGNOSIS — K21.9 GASTROESOPHAGEAL REFLUX DISEASE WITHOUT ESOPHAGITIS: ICD-10-CM

## 2019-04-17 DIAGNOSIS — E78.2 MIXED HYPERLIPIDEMIA: ICD-10-CM

## 2019-04-17 DIAGNOSIS — Z99.89 OSA ON CPAP: ICD-10-CM

## 2019-04-17 DIAGNOSIS — Z23 NEED FOR SHINGLES VACCINE: ICD-10-CM

## 2019-04-17 DIAGNOSIS — E03.9 ACQUIRED HYPOTHYROIDISM: ICD-10-CM

## 2019-04-17 DIAGNOSIS — I10 ESSENTIAL HYPERTENSION: Primary | ICD-10-CM

## 2019-04-17 DIAGNOSIS — G47.33 OSA ON CPAP: ICD-10-CM

## 2019-04-17 DIAGNOSIS — E11.9 TYPE 2 DIABETES MELLITUS WITHOUT COMPLICATION, WITHOUT LONG-TERM CURRENT USE OF INSULIN (HCC): ICD-10-CM

## 2019-04-17 LAB
HCT VFR BLD CALC: 42.9 % (ref 36–48)
HEMOGLOBIN: 13.9 G/DL (ref 12–16)
MCH RBC QN AUTO: 30.4 PG (ref 26–34)
MCHC RBC AUTO-ENTMCNC: 32.4 G/DL (ref 31–36)
MCV RBC AUTO: 93.9 FL (ref 80–100)
PDW BLD-RTO: 15.9 % (ref 12.4–15.4)
PLATELET # BLD: 336 K/UL (ref 135–450)
PMV BLD AUTO: 7.8 FL (ref 5–10.5)
RBC # BLD: 4.57 M/UL (ref 4–5.2)
WBC # BLD: 6.6 K/UL (ref 4–11)

## 2019-04-17 PROCEDURE — 99215 OFFICE O/P EST HI 40 MIN: CPT | Performed by: NURSE PRACTITIONER

## 2019-04-17 PROCEDURE — 90471 IMMUNIZATION ADMIN: CPT | Performed by: NURSE PRACTITIONER

## 2019-04-17 PROCEDURE — 36415 COLL VENOUS BLD VENIPUNCTURE: CPT | Performed by: NURSE PRACTITIONER

## 2019-04-17 PROCEDURE — 90750 HZV VACC RECOMBINANT IM: CPT | Performed by: NURSE PRACTITIONER

## 2019-04-17 RX ORDER — LISINOPRIL 5 MG/1
5 TABLET ORAL DAILY
Qty: 90 TABLET | Refills: 2 | Status: SHIPPED | OUTPATIENT
Start: 2019-04-17 | End: 2019-12-27 | Stop reason: SDUPTHER

## 2019-04-17 ASSESSMENT — ENCOUNTER SYMPTOMS
WATER BRASH: 0
ABDOMINAL DISTENTION: 0
BELCHING: 0
NAUSEA: 0
VISUAL CHANGE: 0
SHORTNESS OF BREATH: 0
CHOKING: 0
ORTHOPNEA: 0
ABDOMINAL PAIN: 0
BLURRED VISION: 0
CHEST TIGHTNESS: 0
STRIDOR: 0
GLOBUS SENSATION: 0
COUGH: 0

## 2019-04-17 NOTE — PROGRESS NOTES
Pt is here for: Dm type II, HTN, HLD, Hypothyroidism, LESLIE, GERD, asthma     Chief Complaint   Patient presents with    Diabetes     3 month follow up// fasting        Diabetes   She presents for her follow-up diabetic visit. She has type 2 diabetes mellitus. Her disease course has been stable. Hypoglycemia symptoms include dizziness. Pertinent negatives for hypoglycemia include no confusion, hunger, mood changes, nervousness/anxiousness, pallor, seizures, sleepiness, speech difficulty or tremors. Pertinent negatives for diabetes include no blurred vision, no chest pain, no fatigue, no foot paresthesias, no foot ulcerations, no polydipsia, no polyphagia, no polyuria, no visual change and no weakness. There are no hypoglycemic complications. Symptoms are stable. There are no diabetic complications. Risk factors for coronary artery disease include dyslipidemia, family history, obesity, male sex, hypertension, tobacco exposure, stress and sedentary lifestyle. Current diabetic treatment includes oral agent (triple therapy). She is compliant with treatment all of the time. She is following a generally healthy diet. Meal planning includes ADA exchanges. She participates in exercise three times a week. An ACE inhibitor/angiotensin II receptor blocker is being taken. She does not see a podiatrist.Eye exam is current. Hypertension   This is a chronic problem. The current episode started more than 1 year ago. The problem has been waxing and waning since onset. The problem is controlled. Pertinent negatives include no anxiety, blurred vision, chest pain, neck pain, orthopnea, palpitations, peripheral edema or shortness of breath. (Intermittent dizziness, patient states she thinks her BP is dropping too low. Currently  and patient has yet to take medication today ) Risk factors for coronary artery disease include diabetes mellitus, dyslipidemia, obesity, family history and stress.  Past treatments include ACE inhibitors. The current treatment provides significant improvement. Compliance problems include diet and exercise. Hyperlipidemia   This is a chronic problem. The current episode started more than 1 year ago. The problem is uncontrolled. Exacerbating diseases include diabetes, hypothyroidism and obesity. Factors aggravating her hyperlipidemia include fatty foods. Pertinent negatives include no chest pain, focal sensory loss, focal weakness, leg pain or shortness of breath. Current antihyperlipidemic treatment includes statins. The current treatment provides moderate improvement of lipids. Compliance problems include adherence to exercise and adherence to diet. Risk factors for coronary artery disease include family history, dyslipidemia, obesity, diabetes mellitus, hypertension and stress. Gastroesophageal Reflux   She reports no abdominal pain, no belching, no chest pain, no choking, no coughing, no dysphagia, no early satiety, no globus sensation, no nausea, no stridor, no tooth decay or no water brash. This is a chronic problem. The current episode started more than 1 year ago. The problem occurs occasionally. The problem has been waxing and waning. The symptoms are aggravated by certain foods. Pertinent negatives include no anemia, fatigue, melena or muscle weakness. She has tried a PPI for the symptoms. The treatment provided significant relief. LESLIE  This problem is chronic and stable. Patient reports compliance w/CPAP therapy. Denies daytime drowsiness. Will monitor. /81 (Site: Right Upper Arm, Position: Sitting, Cuff Size: Large Adult)   Pulse 62   Temp 97.9 °F (36.6 °C) (Oral)   Resp 12   Ht 5' 9\" (1.753 m)   Wt (!) 329 lb (149.2 kg) Comment: shoes on  LMP  (LMP Unknown)   SpO2 94%   Breastfeeding?  No   BMI 48.58 kg/m²     Past Medical History:   Diagnosis Date    Allergic rhinitis     Asthma     Compulsive overeater     DDD (degenerative disc disease), lumbar     Depression  [facility]     Diabetic retinopathy ([facility]     GERD (gastroesophageal reflux disease)     Hyperlipidemia     Hypertension     Hypothyroidism     Obesity     Pulmonary HTN ([facility]     Sleep apnea     cpap    Thyroid nodule     Type 2 diabetes mellitus without complication ([facility]     Vitamin D deficiency        Past Surgical History:   Procedure Laterality Date    JOINT REPLACEMENT Bilateral     tkr    LASIK      REVISION TOTAL KNEE ARTHROPLASTY Right     TOTAL KNEE ARTHROPLASTY Bilateral     WISDOM TOOTH EXTRACTION         Allergies   Allergen Reactions    Penicillins Rash       Outpatient Medications Marked as Taking for the 4/17/19 encounter (Office Visit) with RAUL Mistry CNP   Medication Sig Dispense Refill    venlafaxine (EFFEXOR) 75 MG tablet Take 1 tablet by mouth daily 90 tablet 3    montelukast (SINGULAIR) 10 MG tablet Take 1 tablet by mouth nightly 90 tablet 3    lisinopril (PRINIVIL;ZESTRIL) 10 MG tablet Take 1 tablet by mouth daily 30 tablet 5    Ertugliflozin L-PyroglutamicAc (STEGLATRO) 5 MG TABS Take 5 mg by mouth daily 30 tablet 3    levothyroxine (SYNTHROID) 137 MCG tablet TAKE ONE TABLET BY MOUTH DAILY 30 tablet 12    metFORMIN (GLUCOPHAGE) 500 MG tablet Take 1 tablet by mouth 2 times daily (with meals) 180 tablet 3    glipiZIDE (GLUCOTROL) 5 MG tablet Take 2 tablets by mouth 2 times daily (before meals) 360 tablet 3    sodium chloride (ALTAMIST SPRAY) 0.65 % nasal spray 1 spray by Nasal route as needed for Congestion 1 Bottle 3    Ascorbic Acid (VITAMIN C) 250 MG tablet Take 4 tablets by mouth daily 30 tablet 0    fluticasone (FLONASE) 50 MCG/ACT nasal spray 2 sprays by Nasal route nightly 1 Bottle 0    cetirizine (ZYRTEC) 10 MG tablet Take 1 tablet by mouth daily 30 tablet 0    vitamin D (CHOLECALCIFEROL) 1000 units TABS tablet Take 1 tablet by mouth 2 times daily 30 tablet     Flaxseed, Linseed, (FLAXSEED OIL MAX STR) 1300 MG CAPS Take 1 tablet by mouth daily 30 capsule 0    Cinnamon 500 MG CAPS Take 1 capsule by mouth 2 times daily 30 capsule 0    gelatin 650 MG capsule Take 1 capsule by mouth 2 times daily 30 capsule 0    Multiple Vitamins-Minerals (VISION VITAMINS) TABS Take 1 tablet by mouth daily 30 tablet 0    Turmeric Curcumin 500 MG CAPS Take 1 tablet by mouth 2 times daily 30 capsule 0    B Complex-Folic Acid (SUPER B COMPLEX MAXI) TABS Take 1 tablet by mouth daily 30 tablet 0    Magnesium 400 MG TABS Take 1 tablet by mouth daily 30 tablet 0    psyllium 0.52 g capsule Take 1 capsule by mouth 4 times daily 30 capsule 0    atorvastatin (LIPITOR) 10 MG tablet Take 1 tablet by mouth daily (Patient taking differently: Take 10 mg by mouth daily Every other day) 90 tablet 3    aspirin 81 MG chewable tablet Take 81 mg by mouth daily      Omega-3 Fatty Acids (FISH OIL CONCENTRATE PO) Take by mouth      Multiple Vitamins-Minerals (THERAPEUTIC MULTIVITAMIN-MINERALS) tablet Take 1 tablet by mouth daily      Calcium Acetate, Phos Binder, (CALCIUM ACETATE PO) Take by mouth         Family History   Problem Relation Age of Onset    Other Mother         emphysema, subdural hematoma?     Heart Disease Father     Diabetes Father     Diabetes Maternal Grandmother     Heart Disease Maternal Grandmother     Thyroid Disease Maternal Grandmother        Social History     Socioeconomic History    Marital status: Single     Spouse name: Not on file    Number of children: Not on file    Years of education: Not on file    Highest education level: Not on file   Occupational History    Occupation: not working right now     Comment: looking for a part time job   Social Needs    Financial resource strain: Not on file    Food insecurity:     Worry: Not on file     Inability: Not on file   [employer] needs:     Medical: Not on file     Non-medical: Not on file   Tobacco Use    Smoking status: Never Smoker    Smokeless tobacco: Never Used Substance and Sexual Activity    Alcohol use: No    Drug use: No    Sexual activity: Not Currently   Lifestyle    Physical activity:     Days per week: Not on file     Minutes per session: Not on file    Stress: Not on file   Relationships    Social connections:     Talks on phone: Not on file     Gets together: Not on file     Attends Yazdanism service: Not on file     Active member of club or organization: Not on file     Attends meetings of clubs or organizations: Not on file     Relationship status: Not on file    Intimate partner violence:     Fear of current or ex partner: Not on file     Emotionally abused: Not on file     Physically abused: Not on file     Forced sexual activity: Not on file   Other Topics Concern    Not on file   Social History Narrative    Not on file       Review of Systems   Constitutional: Negative for activity change, appetite change, chills, fatigue and unexpected weight change. HENT: Negative for congestion. Eyes: Negative for blurred vision. Respiratory: Negative for cough, choking, chest tightness and shortness of breath. Cardiovascular: Negative for chest pain, palpitations, orthopnea and leg swelling. Gastrointestinal: Negative for abdominal distention, abdominal pain, dysphagia, melena and nausea. Endocrine: Negative for polydipsia, polyphagia and polyuria. Genitourinary: Negative for dysuria. Musculoskeletal: Negative for muscle weakness and neck pain. Skin: Negative for pallor. Neurological: Positive for dizziness. Negative for tremors, focal weakness, seizures, speech difficulty, weakness and numbness. Hematological: Negative for adenopathy. Psychiatric/Behavioral: Negative for confusion. The patient is not nervous/anxious.         Physical Exam   Nursing note reviewed  /81 (Site: Right Upper Arm, Position: Sitting, Cuff Size: Large Adult)   Pulse 62   Temp 97.9 °F (36.6 °C) (Oral)   Resp 12   Ht 5' 9\" (1.753 m)   Wt (!) 329 lb (149.2 kg) Comment: shoes on  LMP  (LMP Unknown)   SpO2 94%   Breastfeeding? No   BMI 48.58 kg/m²   BP Readings from Last 3 Encounters:   04/17/19 118/81   01/14/19 120/86   10/11/18 116/80     Wt Readings from Last 3 Encounters:   04/17/19 (!) 329 lb (149.2 kg)   01/14/19 (!) 333 lb (151 kg)   10/11/18 (!) 335 lb (152 kg)      Body mass index is 48.58 kg/m². No results found for this visit on 04/17/19. Lab Review   No visits with results within 2 Month(s) from this visit. Latest known visit with results is:   Office Visit on 01/14/2019   Component Date Value    Hemoglobin A1C 01/14/2019 6.7     Microalbumin, Random Uri* 01/14/2019 <1.20     Creatinine, Ur 01/14/2019 105.4     Microalbumin Creatinine * 01/14/2019 see below     Sodium 01/14/2019 144     Potassium 01/14/2019 4.8     Chloride 01/14/2019 102     CO2 01/14/2019 25     Anion Gap 01/14/2019 17*    Glucose 01/14/2019 145*    BUN 01/14/2019 18     CREATININE 01/14/2019 0.8     GFR Non- 01/14/2019 >60     GFR  01/14/2019 >60     Calcium 01/14/2019 9.4     Total Protein 01/14/2019 7.0     Alb 01/14/2019 4.2     Albumin/Globulin Ratio 01/14/2019 1.5     Total Bilirubin 01/14/2019 0.4     Alkaline Phosphatase 01/14/2019 82     ALT 01/14/2019 29     AST 01/14/2019 22     Globulin 01/14/2019 2.8     TSH 01/14/2019 0.77     Cholesterol, Total 01/14/2019 137     Triglycerides 01/14/2019 114     HDL 01/14/2019 41     LDL Calculated 01/14/2019 73     VLDL Cholesterol Calcula* 01/14/2019 23      /81 (Site: Right Upper Arm, Position: Sitting, Cuff Size: Large Adult)   Pulse 62   Temp 97.9 °F (36.6 °C) (Oral)   Resp 12   Ht 5' 9\" (1.753 m)   Wt (!) 329 lb (149.2 kg) Comment: shoes on  LMP  (LMP Unknown)   SpO2 94%   Breastfeeding? No   BMI 48.58 kg/m²       Physical Exam   Constitutional: She is oriented to person, place, and time. She appears well-developed and well-nourished.    Neck:

## 2019-04-18 LAB
ESTIMATED AVERAGE GLUCOSE: 157.1 MG/DL
HBA1C MFR BLD: 7.1 %

## 2019-04-22 RX ORDER — GLIPIZIDE 5 MG/1
10 TABLET ORAL
Qty: 360 TABLET | Refills: 3 | Status: SHIPPED | OUTPATIENT
Start: 2019-04-22 | End: 2020-04-15 | Stop reason: SDUPTHER

## 2019-07-15 ENCOUNTER — OFFICE VISIT (OUTPATIENT)
Dept: INTERNAL MEDICINE CLINIC | Age: 59
End: 2019-07-15
Payer: COMMERCIAL

## 2019-07-15 VITALS
HEART RATE: 61 BPM | DIASTOLIC BLOOD PRESSURE: 65 MMHG | SYSTOLIC BLOOD PRESSURE: 123 MMHG | WEIGHT: 293 LBS | OXYGEN SATURATION: 98 % | BODY MASS INDEX: 48.47 KG/M2

## 2019-07-15 DIAGNOSIS — I10 ESSENTIAL HYPERTENSION: ICD-10-CM

## 2019-07-15 DIAGNOSIS — E03.4 HYPOTHYROIDISM DUE TO ACQUIRED ATROPHY OF THYROID: ICD-10-CM

## 2019-07-15 DIAGNOSIS — E11.9 TYPE 2 DIABETES MELLITUS WITHOUT COMPLICATION, WITHOUT LONG-TERM CURRENT USE OF INSULIN (HCC): Primary | ICD-10-CM

## 2019-07-15 DIAGNOSIS — F32.9 REACTIVE DEPRESSION: ICD-10-CM

## 2019-07-15 DIAGNOSIS — G47.33 OBSTRUCTIVE SLEEP APNEA SYNDROME: ICD-10-CM

## 2019-07-15 DIAGNOSIS — E78.2 MIXED HYPERLIPIDEMIA: ICD-10-CM

## 2019-07-15 DIAGNOSIS — E11.319 DIABETIC RETINOPATHY OF RIGHT EYE ASSOCIATED WITH TYPE 2 DIABETES MELLITUS, MACULAR EDEMA PRESENCE UNSPECIFIED, UNSPECIFIED RETINOPATHY SEVERITY (HCC): ICD-10-CM

## 2019-07-15 PROBLEM — M70.51 PES ANSERINUS BURSITIS OF RIGHT KNEE: Status: RESOLVED | Noted: 2018-08-08 | Resolved: 2019-07-15

## 2019-07-15 LAB — HBA1C MFR BLD: 7.1 %

## 2019-07-15 PROCEDURE — 36415 COLL VENOUS BLD VENIPUNCTURE: CPT | Performed by: INTERNAL MEDICINE

## 2019-07-15 PROCEDURE — 99214 OFFICE O/P EST MOD 30 MIN: CPT | Performed by: INTERNAL MEDICINE

## 2019-07-15 PROCEDURE — 83036 HEMOGLOBIN GLYCOSYLATED A1C: CPT | Performed by: INTERNAL MEDICINE

## 2019-07-15 ASSESSMENT — ENCOUNTER SYMPTOMS
ORTHOPNEA: 0
CHEST TIGHTNESS: 0
VISUAL CHANGE: 0
SHORTNESS OF BREATH: 0
BLURRED VISION: 0
COUGH: 0

## 2019-07-15 NOTE — PROGRESS NOTES
dyslipidemia. Past treatments include ACE inhibitors. The current treatment provides significant improvement. There are no compliance problems. Hypertensive end-organ damage includes retinopathy. Identifiable causes of hypertension include sleep apnea and a thyroid problem. Hyperlipidemia   This is a chronic problem. The current episode started more than 1 year ago. Exacerbating diseases include diabetes, hypothyroidism and obesity. There are no known factors aggravating her hyperlipidemia. Pertinent negatives include no chest pain, focal sensory loss, focal weakness, leg pain or shortness of breath. Current antihyperlipidemic treatment includes statins. Compliance problems include adherence to diet and psychosocial issues. Risk factors for coronary artery disease include diabetes mellitus, dyslipidemia, hypertension, obesity and post-menopausal.     Hypothyroid :  Chronic problem. Compliant with meds. Minimal constipation. Exercising regularly. No wt gain. Wt down 2 lbs. No chest pain, shortness of breath or syncope. Diabetic retinopathy:  Just had dilated exam. Seeing optho      Depression:  Chronic problem x yrs. Compliant with meds. Depression under good control with current medications. Wants to continue. Compulsive overeater. effexor helps. Wt stable. Not eating well. Asthma:  Well controlled on advair. Never uses rescue inhaler. Sleep apnea:  Using cpap daily    Review of Systems   Constitutional: Negative for fatigue. Eyes: Negative for blurred vision and visual disturbance. Respiratory: Negative for cough, chest tightness and shortness of breath. Cardiovascular: Negative for chest pain, palpitations, orthopnea, leg swelling and PND. Endocrine: Negative for polydipsia, polyphagia and polyuria. Neurological: Negative for focal weakness, syncope and light-headedness. Prior to Visit Medications    Medication Sig Taking?  Authorizing Provider   metFORMIN mouth daily Every other day Yes Taco Worthington MD   aspirin 81 MG chewable tablet Take 81 mg by mouth daily Yes Historical Provider, MD   Omega-3 Fatty Acids (FISH OIL CONCENTRATE PO) Take by mouth Yes Historical Provider, MD   Multiple Vitamins-Minerals (THERAPEUTIC MULTIVITAMIN-MINERALS) tablet Take 1 tablet by mouth daily Yes Historical Provider, MD   Calcium Acetate, Phos Binder, (CALCIUM ACETATE PO) Take by mouth Yes Historical Provider, MD   montelukast (SINGULAIR) 10 MG tablet Take 1 tablet by mouth nightly  M Isauro Diaz MD       /65   Pulse 61   Wt (!) 328 lb 3.2 oz (148.9 kg)   LMP  (LMP Unknown)   SpO2 98%   BMI 48.47 kg/m²     Objective:   Physical Exam   Constitutional: She is oriented to person, place, and time. She appears well-developed and well-nourished. No distress. Eyes: Pupils are equal, round, and reactive to light. Neck: No JVD present. No bruits   Cardiovascular: Normal rate, regular rhythm, normal heart sounds and intact distal pulses. Exam reveals no gallop and no friction rub. No murmur heard. Pulmonary/Chest: Effort normal and breath sounds normal. No respiratory distress. She has no wheezes. She has no rales. Musculoskeletal: She exhibits edema. Trace edema   Neurological: She is alert and oriented to person, place, and time. Monofilament normal bilat feet   Skin: Skin is warm and dry. She is not diaphoretic. No erythema. Psychiatric: She has a normal mood and affect. Her behavior is normal. Judgment and thought content normal.   Vitals reviewed. Assessment:      1. Type 2 diabetes mellitus without complication, without long-term current use of insulin ([address]    2. Essential hypertension    3. Mixed hyperlipidemia    4. Hypothyroidism due to acquired atrophy of thyroid    5.  Diabetic retinopathy of right eye associated with type 2 diabetes mellitus, macular edema presence unspecified, unspecified retinopathy severity (HCC)    6. Reactive depression    7. Obstructive sleep apnea syndrome            Plan:          Андрей ba was seen today for diabetes. Diagnoses and all orders for this visit:    Type 2 diabetes mellitus without complication, without long-term current use of insulin ([facility]):  Stable, cont same meds  -     POCT glycosylated hemoglobin (Hb A1C)  -     Lipid Panel; Future  -     Comprehensive Metabolic Panel; Future  -     TSH without Reflex; Future  -     Diabetic Foot Exam    Essential hypertension:  Stable, cont same meds  -     Comprehensive Metabolic Panel; Future    Mixed hyperlipidemia:  Stable, cont same meds  -     Lipid Panel; Future  -     Comprehensive Metabolic Panel; Future    Hypothyroidism due to acquired atrophy of thyroid:  Stable, cont same meds  -     TSH without Reflex; Future    Diabetic retinopathy of right eye associated with type 2 diabetes mellitus, macular edema presence unspecified, unspecified retinopathy severity ([facility]):  Per optho    Reactive depression:  Stable, cont same meds    Obstructive sleep apnea syndrome:  cpap      3 months with Della, 6 months with me.

## 2019-07-16 LAB
A/G RATIO: 1.7 (ref 1.1–2.2)
ALBUMIN SERPL-MCNC: 4.3 G/DL (ref 3.4–5)
ALP BLD-CCNC: 78 U/L (ref 40–129)
ALT SERPL-CCNC: 28 U/L (ref 10–40)
ANION GAP SERPL CALCULATED.3IONS-SCNC: 13 MMOL/L (ref 3–16)
AST SERPL-CCNC: 21 U/L (ref 15–37)
BILIRUB SERPL-MCNC: 0.3 MG/DL (ref 0–1)
BUN BLDV-MCNC: 16 MG/DL (ref 7–20)
CALCIUM SERPL-MCNC: 9.8 MG/DL (ref 8.3–10.6)
CHLORIDE BLD-SCNC: 104 MMOL/L (ref 99–110)
CHOLESTEROL, TOTAL: 153 MG/DL (ref 0–199)
CO2: 27 MMOL/L (ref 21–32)
CREAT SERPL-MCNC: 0.8 MG/DL (ref 0.6–1.2)
GFR AFRICAN AMERICAN: >60
GFR NON-AFRICAN AMERICAN: >60
GLOBULIN: 2.6 G/DL
GLUCOSE BLD-MCNC: 156 MG/DL (ref 70–99)
HDLC SERPL-MCNC: 43 MG/DL (ref 40–60)
LDL CHOLESTEROL CALCULATED: 83 MG/DL
POTASSIUM SERPL-SCNC: 4.8 MMOL/L (ref 3.5–5.1)
SODIUM BLD-SCNC: 144 MMOL/L (ref 136–145)
TOTAL PROTEIN: 6.9 G/DL (ref 6.4–8.2)
TRIGL SERPL-MCNC: 134 MG/DL (ref 0–150)
TSH SERPL DL<=0.05 MIU/L-ACNC: 0.57 UIU/ML (ref 0.27–4.2)
VLDLC SERPL CALC-MCNC: 27 MG/DL

## 2019-07-29 RX ORDER — ATORVASTATIN CALCIUM 10 MG/1
TABLET, FILM COATED ORAL
Qty: 90 TABLET | Refills: 3 | Status: SHIPPED | OUTPATIENT
Start: 2019-07-29 | End: 2020-04-14 | Stop reason: SDUPTHER

## 2019-08-01 ENCOUNTER — NURSE ONLY (OUTPATIENT)
Dept: INTERNAL MEDICINE CLINIC | Age: 59
End: 2019-08-01
Payer: COMMERCIAL

## 2019-08-01 DIAGNOSIS — Z23 NEED FOR SHINGLES VACCINE: Primary | ICD-10-CM

## 2019-08-01 PROCEDURE — 90750 HZV VACC RECOMBINANT IM: CPT | Performed by: INTERNAL MEDICINE

## 2019-08-01 PROCEDURE — 90471 IMMUNIZATION ADMIN: CPT | Performed by: INTERNAL MEDICINE

## 2019-08-13 ENCOUNTER — PATIENT MESSAGE (OUTPATIENT)
Dept: INTERNAL MEDICINE CLINIC | Age: 59
End: 2019-08-13

## 2019-08-13 DIAGNOSIS — R21 RASH AND NONSPECIFIC SKIN ERUPTION: Primary | ICD-10-CM

## 2019-08-13 NOTE — TELEPHONE ENCOUNTER
Needs referral to derm, either Savtia Lyon ( I think he is retired now?)  Or [employer]. Notify pt when done.

## 2019-08-14 DIAGNOSIS — H43.391 VITREOUS FLOATERS OF RIGHT EYE: Primary | ICD-10-CM

## 2019-09-03 RX ORDER — ERTUGLIFLOZIN 5 MG/1
TABLET, FILM COATED ORAL
Qty: 30 TABLET | Refills: 12 | Status: SHIPPED | OUTPATIENT
Start: 2019-09-03 | End: 2020-09-03 | Stop reason: SDUPTHER

## 2019-09-03 NOTE — TELEPHONE ENCOUNTER
Last office visit : 07/15/2019        Future Appointments   Date Time Provider Pat Vaughan   10/17/2019 11:15 AM Rafael Browning [place], [address] [facility]   1/15/2020 11:15 AM DAYANA Reaves MD [facility]

## 2019-10-17 ENCOUNTER — OFFICE VISIT (OUTPATIENT)
Dept: INTERNAL MEDICINE CLINIC | Age: 59
End: 2019-10-17
Payer: COMMERCIAL

## 2019-10-17 VITALS
WEIGHT: 293 LBS | HEART RATE: 66 BPM | BODY MASS INDEX: 43.4 KG/M2 | HEIGHT: 69 IN | RESPIRATION RATE: 16 BRPM | DIASTOLIC BLOOD PRESSURE: 69 MMHG | TEMPERATURE: 97.8 F | OXYGEN SATURATION: 95 % | SYSTOLIC BLOOD PRESSURE: 130 MMHG

## 2019-10-17 DIAGNOSIS — E03.4 HYPOTHYROIDISM DUE TO ACQUIRED ATROPHY OF THYROID: ICD-10-CM

## 2019-10-17 DIAGNOSIS — E66.01 MORBID OBESITY WITH BMI OF 45.0-49.9, ADULT (HCC): ICD-10-CM

## 2019-10-17 DIAGNOSIS — E78.2 MIXED HYPERLIPIDEMIA: ICD-10-CM

## 2019-10-17 DIAGNOSIS — G47.33 OBSTRUCTIVE SLEEP APNEA SYNDROME: ICD-10-CM

## 2019-10-17 DIAGNOSIS — K21.9 GASTROESOPHAGEAL REFLUX DISEASE, ESOPHAGITIS PRESENCE NOT SPECIFIED: ICD-10-CM

## 2019-10-17 DIAGNOSIS — F32.A DEPRESSION, UNSPECIFIED DEPRESSION TYPE: ICD-10-CM

## 2019-10-17 DIAGNOSIS — E11.319 DIABETIC RETINOPATHY OF RIGHT EYE ASSOCIATED WITH TYPE 2 DIABETES MELLITUS, MACULAR EDEMA PRESENCE UNSPECIFIED, UNSPECIFIED RETINOPATHY SEVERITY (HCC): ICD-10-CM

## 2019-10-17 DIAGNOSIS — I10 ESSENTIAL HYPERTENSION: ICD-10-CM

## 2019-10-17 DIAGNOSIS — E11.9 TYPE 2 DIABETES MELLITUS WITHOUT COMPLICATION, WITHOUT LONG-TERM CURRENT USE OF INSULIN (HCC): Primary | ICD-10-CM

## 2019-10-17 PROCEDURE — 90686 IIV4 VACC NO PRSV 0.5 ML IM: CPT | Performed by: NURSE PRACTITIONER

## 2019-10-17 PROCEDURE — 99214 OFFICE O/P EST MOD 30 MIN: CPT | Performed by: NURSE PRACTITIONER

## 2019-10-17 PROCEDURE — 90471 IMMUNIZATION ADMIN: CPT | Performed by: NURSE PRACTITIONER

## 2019-10-17 ASSESSMENT — ENCOUNTER SYMPTOMS
BELCHING: 0
ABDOMINAL PAIN: 0
SINUS PRESSURE: 0
BLURRED VISION: 0
SHORTNESS OF BREATH: 0
GLOBUS SENSATION: 0
RHINORRHEA: 0
WATER BRASH: 0
NAUSEA: 0
CHOKING: 0
SORE THROAT: 0
ORTHOPNEA: 0
VISUAL CHANGE: 0
CHEST TIGHTNESS: 0
SINUS PAIN: 0
COUGH: 0
STRIDOR: 0
ABDOMINAL DISTENTION: 0
BACK PAIN: 0

## 2019-11-25 DIAGNOSIS — E03.4 HYPOTHYROIDISM DUE TO ACQUIRED ATROPHY OF THYROID: ICD-10-CM

## 2019-11-25 RX ORDER — LEVOTHYROXINE SODIUM 137 UG/1
137 TABLET ORAL DAILY
Qty: 30 TABLET | Refills: 12 | Status: SHIPPED
Start: 2019-11-25 | End: 2020-07-21 | Stop reason: DRUGHIGH

## 2019-12-27 DIAGNOSIS — I10 ESSENTIAL HYPERTENSION: ICD-10-CM

## 2019-12-27 RX ORDER — LISINOPRIL 5 MG/1
5 TABLET ORAL DAILY
Qty: 90 TABLET | Refills: 3 | Status: SHIPPED | OUTPATIENT
Start: 2019-12-27 | End: 2021-01-02 | Stop reason: SDUPTHER

## 2020-01-15 ENCOUNTER — OFFICE VISIT (OUTPATIENT)
Dept: INTERNAL MEDICINE CLINIC | Age: 60
End: 2020-01-15
Payer: COMMERCIAL

## 2020-01-15 VITALS
HEIGHT: 69 IN | WEIGHT: 293 LBS | DIASTOLIC BLOOD PRESSURE: 84 MMHG | BODY MASS INDEX: 43.4 KG/M2 | SYSTOLIC BLOOD PRESSURE: 118 MMHG | TEMPERATURE: 98 F | RESPIRATION RATE: 16 BRPM | HEART RATE: 63 BPM | OXYGEN SATURATION: 96 %

## 2020-01-15 LAB
A/G RATIO: 1.4 (ref 1.1–2.2)
ALBUMIN SERPL-MCNC: 4.2 G/DL (ref 3.4–5)
ALP BLD-CCNC: 74 U/L (ref 40–129)
ALT SERPL-CCNC: 27 U/L (ref 10–40)
ANION GAP SERPL CALCULATED.3IONS-SCNC: 15 MMOL/L (ref 3–16)
AST SERPL-CCNC: 22 U/L (ref 15–37)
BILIRUB SERPL-MCNC: <0.2 MG/DL (ref 0–1)
BUN BLDV-MCNC: 16 MG/DL (ref 7–20)
CALCIUM SERPL-MCNC: 9.4 MG/DL (ref 8.3–10.6)
CHLORIDE BLD-SCNC: 102 MMOL/L (ref 99–110)
CHOLESTEROL, TOTAL: 150 MG/DL (ref 0–199)
CO2: 25 MMOL/L (ref 21–32)
CREAT SERPL-MCNC: 0.8 MG/DL (ref 0.6–1.2)
CREATININE URINE: 168.8 MG/DL (ref 28–259)
GFR AFRICAN AMERICAN: >60
GFR NON-AFRICAN AMERICAN: >60
GLOBULIN: 2.9 G/DL
GLUCOSE BLD-MCNC: 152 MG/DL (ref 70–99)
HBA1C MFR BLD: 6.9 %
HDLC SERPL-MCNC: 45 MG/DL (ref 40–60)
HEPATITIS C ANTIBODY INTERPRETATION: NORMAL
LDL CHOLESTEROL CALCULATED: 83 MG/DL
MICROALBUMIN UR-MCNC: <1.2 MG/DL
MICROALBUMIN/CREAT UR-RTO: NORMAL MG/G (ref 0–30)
POTASSIUM SERPL-SCNC: 4.7 MMOL/L (ref 3.5–5.1)
SODIUM BLD-SCNC: 142 MMOL/L (ref 136–145)
TOTAL PROTEIN: 7.1 G/DL (ref 6.4–8.2)
TRIGL SERPL-MCNC: 112 MG/DL (ref 0–150)
TSH REFLEX: 0.31 UIU/ML (ref 0.27–4.2)
VLDLC SERPL CALC-MCNC: 22 MG/DL

## 2020-01-15 PROCEDURE — 83036 HEMOGLOBIN GLYCOSYLATED A1C: CPT | Performed by: INTERNAL MEDICINE

## 2020-01-15 PROCEDURE — 99214 OFFICE O/P EST MOD 30 MIN: CPT | Performed by: INTERNAL MEDICINE

## 2020-01-15 PROCEDURE — 36415 COLL VENOUS BLD VENIPUNCTURE: CPT | Performed by: INTERNAL MEDICINE

## 2020-01-15 SDOH — ECONOMIC STABILITY: FOOD INSECURITY: WITHIN THE PAST 12 MONTHS, THE FOOD YOU BOUGHT JUST DIDN'T LAST AND YOU DIDN'T HAVE MONEY TO GET MORE.: NEVER TRUE

## 2020-01-15 SDOH — ECONOMIC STABILITY: FOOD INSECURITY: WITHIN THE PAST 12 MONTHS, YOU WORRIED THAT YOUR FOOD WOULD RUN OUT BEFORE YOU GOT MONEY TO BUY MORE.: NEVER TRUE

## 2020-01-15 SDOH — ECONOMIC STABILITY: INCOME INSECURITY: HOW HARD IS IT FOR YOU TO PAY FOR THE VERY BASICS LIKE FOOD, HOUSING, MEDICAL CARE, AND HEATING?: NOT HARD AT ALL

## 2020-01-15 SDOH — ECONOMIC STABILITY: TRANSPORTATION INSECURITY
IN THE PAST 12 MONTHS, HAS LACK OF TRANSPORTATION KEPT YOU FROM MEETINGS, WORK, OR FROM GETTING THINGS NEEDED FOR DAILY LIVING?: NO

## 2020-01-15 SDOH — ECONOMIC STABILITY: TRANSPORTATION INSECURITY
IN THE PAST 12 MONTHS, HAS THE LACK OF TRANSPORTATION KEPT YOU FROM MEDICAL APPOINTMENTS OR FROM GETTING MEDICATIONS?: NO

## 2020-01-15 ASSESSMENT — ENCOUNTER SYMPTOMS
VISUAL CHANGE: 0
SHORTNESS OF BREATH: 0
COUGH: 0
CHEST TIGHTNESS: 0
ORTHOPNEA: 0
BLURRED VISION: 0

## 2020-01-15 NOTE — PROGRESS NOTES
daily Yes Renae Valenzuela MD   aspirin 81 MG chewable tablet Take 81 mg by mouth daily Yes Historical Provider, MD   Omega-3 Fatty Acids (FISH OIL CONCENTRATE PO) Take by mouth Yes Historical Provider, MD   Multiple Vitamins-Minerals (THERAPEUTIC MULTIVITAMIN-MINERALS) tablet Take 1 tablet by mouth daily Yes Historical Provider, MD   Calcium Acetate, Phos Binder, (CALCIUM ACETATE PO) Take by mouth Yes Historical Provider, MD       /84 (Site: Left Upper Arm, Position: Sitting, Cuff Size: Large Adult)   Pulse 63   Temp 98 °F (36.7 °C) (Oral)   Resp 16   Ht 5' 9.25\" (1.759 m)   Wt (!) 327 lb (148.3 kg)   LMP  (LMP Unknown)   SpO2 96%   BMI 47.94 kg/m²     Objective:   Physical Exam   Constitutional: She is oriented to person, place, and time. She appears well-developed and well-nourished. No distress. Eyes: Pupils are equal, round, and reactive to light. Neck: No JVD present. No bruits   Cardiovascular: Normal rate, regular rhythm and normal heart sounds. Exam reveals no gallop and no friction rub. No murmur heard. Pulmonary/Chest: Effort normal and breath sounds normal. No respiratory distress. She has no wheezes. She has no rales. Musculoskeletal:         General: Edema present. Comments: Trace edema   Neurological: She is alert and oriented to person, place, and time. Skin: Skin is warm and dry. She is not diaphoretic. No erythema. Psychiatric: She has a normal mood and affect. Her behavior is normal. Judgment and thought content normal.   Vitals reviewed. Assessment:      1. Type 2 diabetes mellitus without complication, without long-term current use of insulin ([address]    2. Mixed hyperlipidemia    3. Essential hypertension    4. Hypothyroidism due to acquired atrophy of thyroid    5. Diabetic retinopathy of right eye associated with type 2 diabetes mellitus, macular edema presence unspecified, unspecified retinopathy severity (HCC)    6. Reactive depression    7.  Obstructive

## 2020-01-16 LAB
HIV AG/AB: NORMAL
HIV ANTIGEN: NORMAL
HIV-1 ANTIBODY: NORMAL
HIV-2 AB: NORMAL

## 2020-02-24 RX ORDER — MONTELUKAST SODIUM 10 MG/1
10 TABLET ORAL NIGHTLY
Qty: 90 TABLET | Refills: 3 | Status: SHIPPED | OUTPATIENT
Start: 2020-02-24 | End: 2021-03-02 | Stop reason: SDUPTHER

## 2020-02-24 NOTE — TELEPHONE ENCOUNTER
Refill request for singular last filled 3/11/19     Last ov 1/15/20                                               Next ov 4/21/20

## 2020-03-11 ENCOUNTER — OFFICE VISIT (OUTPATIENT)
Dept: ORTHOPEDIC SURGERY | Age: 60
End: 2020-03-11
Payer: COMMERCIAL

## 2020-03-11 VITALS
WEIGHT: 293 LBS | HEIGHT: 69 IN | DIASTOLIC BLOOD PRESSURE: 88 MMHG | SYSTOLIC BLOOD PRESSURE: 128 MMHG | BODY MASS INDEX: 43.4 KG/M2 | HEART RATE: 72 BPM

## 2020-03-11 PROCEDURE — 99213 OFFICE O/P EST LOW 20 MIN: CPT | Performed by: ORTHOPAEDIC SURGERY

## 2020-03-11 PROCEDURE — 20610 DRAIN/INJ JOINT/BURSA W/O US: CPT | Performed by: ORTHOPAEDIC SURGERY

## 2020-03-11 RX ORDER — LIDOCAINE HYDROCHLORIDE 10 MG/ML
2 INJECTION, SOLUTION INFILTRATION; PERINEURAL ONCE
Status: COMPLETED | OUTPATIENT
Start: 2020-03-11 | End: 2020-03-11

## 2020-03-11 RX ORDER — METHYLPREDNISOLONE ACETATE 40 MG/ML
80 INJECTION, SUSPENSION INTRA-ARTICULAR; INTRALESIONAL; INTRAMUSCULAR; SOFT TISSUE ONCE
Status: COMPLETED | OUTPATIENT
Start: 2020-03-11 | End: 2020-03-11

## 2020-03-11 RX ADMIN — LIDOCAINE HYDROCHLORIDE 2 ML: 10 INJECTION, SOLUTION INFILTRATION; PERINEURAL at 10:47

## 2020-03-11 RX ADMIN — METHYLPREDNISOLONE ACETATE 80 MG: 40 INJECTION, SUSPENSION INTRA-ARTICULAR; INTRALESIONAL; INTRAMUSCULAR; SOFT TISSUE at 10:48

## 2020-03-11 NOTE — PATIENT INSTRUCTIONS
Impression:   1. Recurrence of past bursitis right knee  2. Otherwise stable revision right knee replacement which is never regained his full motion. 3.  Strain of extensor mechanism left knee from recent fall without evidence of rupture. 4.  Otherwise stable left total knee replacement. Plan/Treatment:   1. Faith Mejia was reassured concerning both total knee replacements. 2.  The left knee extensor mechanism strain should subside without lasting problems. 3.  Treatment options were discussed for the right knee. She desired the same injection as given at last office visit. Injection with cortisone was recommended into the  right  Knee pes bursa. After discussing potential risks and benefits she agreed. The injection site was cleaned with alcohol, anesthetized with 1 cc of 1% Lidoocaine then injected with 2 cc of 40 mg Depomedrol intraarticular after sufficient time for analgesia was allowed. 4.  Physical therapy was also discussed however she is doing an extensive workout for both knees going to the gym 5 days a week 3 days for aquatic therapy and two days workout on other equipment. Therefore therapy was not reordered. 5.  She will return here as needed.       Faylene Skiff, MD  3/11/2020

## 2020-03-11 NOTE — PROGRESS NOTES
FOLLOW-UP EVALUATION OF KNEES    3/11/2020    Mariposa Rodriguez      1960      Jae Caldwell is seen for Knee Pain (Bilateral knees)        Jae Caldwell returns after 18 months for evaluation this time of bilateral total knee replacements. She was last seen on August 8 of 2018 for the right knee. She had an original right total knee replacement in 2004. Within a year she underwent an open arthrotomy for lysis of adhesions. She then had revision of the right total knee replacement to longstem tibial component in 2012. She did well for the first few years but then had recurrence of pain along the medial plateau. After evaluation and x-rays showed stable knee replacement she was treated by myself with an injection into her pes bursa and physical therapy with resolution of her pain. She states the right knee did well for over a year but has started to become symptomatic again in the same region that is in the area of the medial plateau. She complains of intermittent pain with no pain at rest but pain up to 5 with activities and stair climbing. She states that if she ambulates about 15 to 20 minutes she has recurrence of her pain. As to her left knee she did undergo a left total knee replacement in 2009 and has done very well over the years with this left knee. She unfortunately had a fall recently in which she caught her left foot on the steps and then fell causing hyperflexion of her left knee. She was able to slow her self in the fall on a handrail but feels that she strained her left knee during the fall. She has soreness in the quadriceps and patellar tendon region. There is no evidence of ecchymosis and no loss of function of the left knee from the fall. Pertinent items are noted in HPI  Review of systems reviewed from Patient History Form dated on 3/11/2020 and available in the patient's chart under the Media tab.      The patient's past medical history, total knee components also remain in good position with no signs of radiolucency or ostial lysis. Impression:   1. Recurrence of past bursitis right knee  2. Otherwise stable revision right knee replacement which has never regained its full motion. 3.  Strain of extensor mechanism left knee from recent fall without evidence of rupture. 4.  Otherwise stable left total knee replacement. Plan/Treatment:   1. Luz Marina Christensen was reassured concerning both total knee replacements. 2.  The left knee extensor mechanism strain should subside without lasting problems. 3.  Treatment options were discussed for the right knee. She desired the same injection as given at last office visit. Injection with cortisone was recommended into the  right  Knee pes bursa. After discussing potential risks and benefits she agreed. The injection site was cleaned with alcohol, anesthetized with 1 cc of 1% Lidoocaine then injected with 2 cc of 40 mg Depomedrol intraarticular after sufficient time for analgesia was allowed. 4.  Physical therapy was also discussed however she is doing an extensive workout for both knees going to the gym 5 days a week 3 days for aquatic therapy and two days workout on other equipment. Therefore therapy was not reordered. 5.  She will return here as needed. Linder Harada, MD  3/11/2020    This dictation was done with Dragon dictkike and may contain mechanical errors related to translation.

## 2020-03-13 ENCOUNTER — OFFICE VISIT (OUTPATIENT)
Dept: ENT CLINIC | Age: 60
End: 2020-03-13
Payer: COMMERCIAL

## 2020-03-13 VITALS
HEART RATE: 66 BPM | DIASTOLIC BLOOD PRESSURE: 83 MMHG | SYSTOLIC BLOOD PRESSURE: 153 MMHG | WEIGHT: 293 LBS | HEIGHT: 69 IN | TEMPERATURE: 96.9 F | BODY MASS INDEX: 43.4 KG/M2

## 2020-03-13 PROCEDURE — 99203 OFFICE O/P NEW LOW 30 MIN: CPT | Performed by: OTOLARYNGOLOGY

## 2020-03-13 PROCEDURE — 40808 BIOPSY OF MOUTH LESION: CPT | Performed by: OTOLARYNGOLOGY

## 2020-03-13 NOTE — PROGRESS NOTES
Ashly      Patient Name: [id number] [address] Record Number:  <[id number]>  Primary Care Physician:  Priscila Beltran MD  Date of Consultation: 3/13/2020    Chief Complaint: Oral lesions        HISTORY OF PRESENT ILLNESS  Thiago Melo is a(n) 56 y.o. female who presents for evaluation of oral lesions. Patient says that for the past 2 to 3 years her dentist has noticed some white areas inside of her mouth on her cheeks. She says initially there was not a lot of concern, but most recently it was suggested that she see an ear nose and throat doctor for a biopsy. The patient says she is been relatively asymptomatic, but does think that she has a little bit of discomfort in the left cheek. She tends to sleep on her left side, but sometimes lies on her right side and watching television. She has not noticed any other symptoms. She does not have any history of thrush. her diabetes has been under relatively good control. She does not have a smoking history nor a significant alcohol history. She denies coughing up blood, weight loss voice changes or other concerning symptoms.       Patient Active Problem List   Diagnosis    Type 2 diabetes mellitus without complication, without long-term current use of insulin ([address]    Mixed hyperlipidemia    Essential hypertension    Obstructive sleep apnea syndrome    GERD (gastroesophageal reflux disease)    Reactive depression    Compulsive overeater    DDD (degenerative disc disease), lumbar    Vitamin D deficiency    Pulmonary HTN ([address]    Obesity    Diabetic retinopathy of right eye associated with type 2 diabetes mellitus ([address]    Hypothyroidism due to acquired atrophy of thyroid     Past Surgical History:   Procedure Laterality Date    JOINT REPLACEMENT Bilateral     tkr    LASIK      REVISION TOTAL KNEE ARTHROPLASTY Right     TOTAL KNEE ARTHROPLASTY Bilateral     WISDOM TOOTH EXTRACTION tablet Take 1 tablet by mouth 2 times daily (with meals) 1/20/20   Ayala Gallardo MD   lisinopril (PRINIVIL;ZESTRIL) 5 MG tablet Take 1 tablet by mouth daily 12/27/19   Ayala Gallardo MD   levothyroxine (SYNTHROID) 137 MCG tablet Take 1 tablet by mouth Daily 11/25/19   Ayala Gallardo MD   STEGLATRO 5 MG TABS TAKE ONE TABLET BY MOUTH DAILY 9/3/19   Ayala Gallardo MD   atorvastatin (LIPITOR) 10 MG tablet TAKE 1 TABLET BY MOUTH ONE TIME A DAY  7/29/19   Ayala Gallardo MD   glipiZIDE (GLUCOTROL) 5 MG tablet Take 2 tablets by mouth 2 times daily (before meals) 4/22/19   Ayala Gallardo MD   venlafaxine [facility]) 75 MG tablet Take 1 tablet by mouth daily 4/16/19   Ayala Gallardo MD   sodium chloride (ALTAMIST SPRAY) 0.65 % nasal spray 1 spray by Nasal route as needed for Congestion 4/6/18   Tere Albrecht [place], RAUL - CNP   Ascorbic Acid (VITAMIN C) 250 MG tablet Take 4 tablets by mouth daily 3/23/18   Ayala Gallardo MD   fluticasone [facility]) 50 MCG/ACT nasal spray 2 sprays by Nasal route nightly 3/23/18   Ayala Gallardo MD   cetirizine (ZYRTEC) 10 MG tablet Take 1 tablet by mouth daily 3/23/18   Ayala Gallardo MD   vitamin D (CHOLECALCIFEROL) 1000 units TABS tablet Take 1 tablet by mouth 2 times daily 3/23/18   Ayala Gallardo MD   Flaxseed, Linseed, (FLAXSEED OIL MAX STR) 1300 MG CAPS Take 1 tablet by mouth daily 3/23/18   Ayala Gallardo MD   Cinnamon 500 MG CAPS Take 1 capsule by mouth 2 times daily 3/23/18   Ayala Gallardo MD   Multiple Vitamins-Minerals (VISION VITAMINS) TABS Take 1 tablet by mouth daily 3/23/18   Ayala Gallardo MD   Turmeric Curcumin 500 MG CAPS Take 1 tablet by mouth 2 times daily 3/23/18   Ayala Gallardo MD   B Complex-Folic Acid (SUPER B COMPLEX MAXI) TABS Take 1 tablet by mouth daily 3/23/18   Ayala Gallardo MD   Magnesium 400 MG TABS Take 1 tablet by mouth daily 3/23/18   Ayala Gallardo MD   psyllium 0.52 g capsule Take 1

## 2020-03-20 ENCOUNTER — TELEPHONE (OUTPATIENT)
Dept: ENT CLINIC | Age: 60
End: 2020-03-20

## 2020-03-20 ENCOUNTER — OFFICE VISIT (OUTPATIENT)
Dept: ENT CLINIC | Age: 60
End: 2020-03-20

## 2020-03-20 VITALS
BODY MASS INDEX: 47.99 KG/M2 | DIASTOLIC BLOOD PRESSURE: 82 MMHG | SYSTOLIC BLOOD PRESSURE: 149 MMHG | TEMPERATURE: 96.7 F | WEIGHT: 293 LBS | HEART RATE: 67 BPM

## 2020-03-20 PROCEDURE — 99024 POSTOP FOLLOW-UP VISIT: CPT | Performed by: OTOLARYNGOLOGY

## 2020-03-20 NOTE — PROGRESS NOTES
SPRAY) 0.65 % nasal spray 1 spray by Nasal route as needed for Congestion 4/6/18   Leti Gibson [place], APRN - CNP   Ascorbic Acid (VITAMIN C) 250 MG tablet Take 4 tablets by mouth daily 3/23/18   Simón Sosa MD   fluticasone [facility]) 50 MCG/ACT nasal spray 2 sprays by Nasal route nightly 3/23/18   Simón Sosa MD   cetirizine (ZYRTEC) 10 MG tablet Take 1 tablet by mouth daily 3/23/18   Simón Sosa MD   vitamin D (CHOLECALCIFEROL) 1000 units TABS tablet Take 1 tablet by mouth 2 times daily 3/23/18   Simón Sosa MD   Flaxseed, Linseed, (FLAXSEED OIL MAX STR) 1300 MG CAPS Take 1 tablet by mouth daily 3/23/18   Simón Sosa MD   Cinnamon 500 MG CAPS Take 1 capsule by mouth 2 times daily 3/23/18   Simón Sosa MD   Multiple Vitamins-Minerals (VISION VITAMINS) TABS Take 1 tablet by mouth daily 3/23/18   Simón Sosa MD   Turmeric Curcumin 500 MG CAPS Take 1 tablet by mouth 2 times daily 3/23/18   Simón Sosa MD   B Complex-Folic Acid (SUPER B COMPLEX MAXI) TABS Take 1 tablet by mouth daily 3/23/18   Simón Sosa MD   Magnesium 400 MG TABS Take 1 tablet by mouth daily 3/23/18   Simón Sosa MD   psyllium 0.52 g capsule Take 1 capsule by mouth 4 times daily 3/23/18   Simón Sosa MD   aspirin 81 MG chewable tablet Take 81 mg by mouth daily    Historical Provider, MD   Omega-3 Fatty Acids (FISH OIL CONCENTRATE PO) Take by mouth    Historical Provider, MD   Multiple Vitamins-Minerals (THERAPEUTIC MULTIVITAMIN-MINERALS) tablet Take 1 tablet by mouth daily    Historical Provider, MD   Calcium Acetate, Phos Binder, (CALCIUM ACETATE PO) Take by mouth    Historical Provider, MD       REVIEW OF SYSTEMS  The following systems were reviewed and revealed the following in addition to any already discussed in the HPI:    CONSTITUTIONAL: no weight loss, no fever, no night sweats, no chills  EYES: no vision changes, no blurry vision  EARS: no changes in hearing, no

## 2020-03-20 NOTE — TELEPHONE ENCOUNTER
I attempted to call her. The biopsy showed lichen planus which is not cancerous, but we should keep an eye on it.   Have her follow-up in 3 to 4 months

## 2020-04-21 ENCOUNTER — VIRTUAL VISIT (OUTPATIENT)
Dept: INTERNAL MEDICINE CLINIC | Age: 60
End: 2020-04-21
Payer: COMMERCIAL

## 2020-04-21 PROCEDURE — 99443 PR PHYS/QHP TELEPHONE EVALUATION 21-30 MIN: CPT | Performed by: NURSE PRACTITIONER

## 2020-06-18 ENCOUNTER — OFFICE VISIT (OUTPATIENT)
Dept: ENT CLINIC | Age: 60
End: 2020-06-18
Payer: COMMERCIAL

## 2020-06-18 VITALS
HEIGHT: 69 IN | TEMPERATURE: 97.4 F | DIASTOLIC BLOOD PRESSURE: 85 MMHG | SYSTOLIC BLOOD PRESSURE: 148 MMHG | BODY MASS INDEX: 43.4 KG/M2 | HEART RATE: 69 BPM | WEIGHT: 293 LBS

## 2020-06-18 PROCEDURE — 99213 OFFICE O/P EST LOW 20 MIN: CPT | Performed by: OTOLARYNGOLOGY

## 2020-06-18 NOTE — PROGRESS NOTES
1 tablet by mouth Daily 11/25/19   Joi Flores MD   STEGLATRO 5 MG TABS TAKE ONE TABLET BY MOUTH DAILY 9/3/19   Joi Flores MD   sodium chloride (ALTAMIST SPRAY) 0.65 % nasal spray 1 spray by Nasal route as needed for Congestion 4/6/18   Elian Armando, APRN - CNP   Ascorbic Acid (VITAMIN C) 250 MG tablet Take 4 tablets by mouth daily 3/23/18   Joi Flores MD   fluticasone [facility]) 50 MCG/ACT nasal spray 2 sprays by Nasal route nightly 3/23/18   Joi Flores MD   cetirizine (ZYRTEC) 10 MG tablet Take 1 tablet by mouth daily 3/23/18   Joi Flores MD   vitamin D (CHOLECALCIFEROL) 1000 units TABS tablet Take 1 tablet by mouth 2 times daily 3/23/18   Joi Flores MD   Flaxseed, Linseed, (FLAXSEED OIL MAX STR) 1300 MG CAPS Take 1 tablet by mouth daily 3/23/18   Joi Flores MD   Cinnamon 500 MG CAPS Take 1 capsule by mouth 2 times daily 3/23/18   Joi Flores MD   Multiple Vitamins-Minerals (VISION VITAMINS) TABS Take 1 tablet by mouth daily 3/23/18   Joi Flores MD   Turmeric Curcumin 500 MG CAPS Take 1 tablet by mouth 2 times daily 3/23/18   Joi Flores MD   B Complex-Folic Acid (SUPER B COMPLEX MAXI) TABS Take 1 tablet by mouth daily 3/23/18   Joi Flores MD   Magnesium 400 MG TABS Take 1 tablet by mouth daily 3/23/18   Joi Flores MD   psyllium 0.52 g capsule Take 1 capsule by mouth 4 times daily 3/23/18   Joi Flores MD   aspirin 81 MG chewable tablet Take 81 mg by mouth daily    Historical Provider, MD   Omega-3 Fatty Acids (FISH OIL CONCENTRATE PO) Take by mouth    Historical Provider, MD   Multiple Vitamins-Minerals (THERAPEUTIC MULTIVITAMIN-MINERALS) tablet Take 1 tablet by mouth daily    Historical Provider, MD   Calcium Acetate, Phos Binder, (CALCIUM ACETATE PO) Take by mouth    Historical Provider, MD       REVIEW OF SYSTEMS  The following systems were reviewed and revealed the following in addition to any already discussed in the HPI:    CONSTITUTIONAL: no weight loss, no fever, no night sweats, no chills  EYES: no vision changes, no blurry vision  EARS: no changes in hearing, no otalgia  NOSE: no epistaxis, no rhinorrhea  RESPIRATORY: no  Difficulty breathing, no shortness of breath  CV: no chest pain, no Peripheral vascular disease  HEME: No coagulation disorder, no Bleeding disorder  NEURO: no TIA or stroke-like symptoms  SKIN: No new rashes in the head and neck, no recent skin cancers  MOUTH: Lichen planus  GASTROINTESTINAL: No diarrhea, stomach pain  PSYCH: No anxiety, no depression      PHYSICAL EXAM  BP (!) 148/85 (Site: Left Upper Arm, Position: Sitting, Cuff Size: Medium Adult)   Pulse 69   Temp 97.4 °F (36.3 °C) (Oral)   Ht 5' 9\" (1.753 m)   Wt (!) 333 lb (151 kg)   LMP  (LMP Unknown)   BMI 49.18 kg/m²     GENERAL: No Acute Distress, Alert and Oriented, no Hoarseness, strong voice  EYES: EOMI, Anti-icteric  HENT:   Head: Normocephalic and atraumatic. Face:  Symmetric, facial nerve intact, no sinus tenderness  Right Ear: Normal external ear, normal external auditory canal, intact tympanic membrane with normal mobility and aerated middle ear  Left Ear: Normal external ear, normal external auditory canal, intact tympanic membrane with normal mobility and aerated middle ear  Mouth/Oral Cavity: The patient has bilateral linear white streaks in the posterior buccal region adjacent to the posterior molars. I do not feel as though this is changed significantly since March. Oropharynx/Larynx:  normal oropharynx, normal tonsils; normal larynx/nasopharynx on mirror exam  Nose:Normal external nasal appearance. Anterior rhinoscopy shows a normal septum. normal turbinates.   Normal mucosa   NECK: Normal range of motion, no thyromegaly, trachea is midline, no lymphadenopathy, no neck masses, no crepitus  CHEST: Normal respiratory effort, no retractions, breathing comfortably  SKIN: No rashes, normal appearing skin, no

## 2020-07-20 ENCOUNTER — OFFICE VISIT (OUTPATIENT)
Dept: INTERNAL MEDICINE CLINIC | Age: 60
End: 2020-07-20
Payer: COMMERCIAL

## 2020-07-20 VITALS
HEART RATE: 74 BPM | TEMPERATURE: 97.4 F | RESPIRATION RATE: 16 BRPM | HEIGHT: 69 IN | BODY MASS INDEX: 43.4 KG/M2 | DIASTOLIC BLOOD PRESSURE: 78 MMHG | OXYGEN SATURATION: 97 % | WEIGHT: 293 LBS | SYSTOLIC BLOOD PRESSURE: 132 MMHG

## 2020-07-20 LAB
A/G RATIO: 1.4 (ref 1.1–2.2)
ALBUMIN SERPL-MCNC: 4.2 G/DL (ref 3.4–5)
ALP BLD-CCNC: 71 U/L (ref 40–129)
ALT SERPL-CCNC: 27 U/L (ref 10–40)
ANION GAP SERPL CALCULATED.3IONS-SCNC: 15 MMOL/L (ref 3–16)
AST SERPL-CCNC: 19 U/L (ref 15–37)
BILIRUB SERPL-MCNC: <0.2 MG/DL (ref 0–1)
BUN BLDV-MCNC: 21 MG/DL (ref 7–20)
CALCIUM SERPL-MCNC: 9.6 MG/DL (ref 8.3–10.6)
CHLORIDE BLD-SCNC: 106 MMOL/L (ref 99–110)
CHOLESTEROL, TOTAL: 160 MG/DL (ref 0–199)
CO2: 22 MMOL/L (ref 21–32)
CREAT SERPL-MCNC: 0.8 MG/DL (ref 0.6–1.2)
GFR AFRICAN AMERICAN: >60
GFR NON-AFRICAN AMERICAN: >60
GLOBULIN: 2.9 G/DL
GLUCOSE BLD-MCNC: 158 MG/DL (ref 70–99)
HBA1C MFR BLD: 6.6 %
HDLC SERPL-MCNC: 45 MG/DL (ref 40–60)
LDL CHOLESTEROL CALCULATED: 94 MG/DL
POTASSIUM SERPL-SCNC: 4.2 MMOL/L (ref 3.5–5.1)
SODIUM BLD-SCNC: 143 MMOL/L (ref 136–145)
T3 TOTAL: 1.15 NG/ML (ref 0.8–2)
T4 FREE: 1.6 NG/DL (ref 0.9–1.8)
TOTAL PROTEIN: 7.1 G/DL (ref 6.4–8.2)
TRIGL SERPL-MCNC: 103 MG/DL (ref 0–150)
TSH REFLEX: 0.15 UIU/ML (ref 0.27–4.2)
VLDLC SERPL CALC-MCNC: 21 MG/DL

## 2020-07-20 PROCEDURE — 36415 COLL VENOUS BLD VENIPUNCTURE: CPT | Performed by: INTERNAL MEDICINE

## 2020-07-20 PROCEDURE — 83036 HEMOGLOBIN GLYCOSYLATED A1C: CPT | Performed by: INTERNAL MEDICINE

## 2020-07-20 PROCEDURE — 99214 OFFICE O/P EST MOD 30 MIN: CPT | Performed by: INTERNAL MEDICINE

## 2020-07-20 ASSESSMENT — ENCOUNTER SYMPTOMS
SHORTNESS OF BREATH: 0
ORTHOPNEA: 0
BLURRED VISION: 0
CHEST TIGHTNESS: 0
VISUAL CHANGE: 0
COUGH: 0

## 2020-07-20 NOTE — PROGRESS NOTES
Subjective:    cc:  dm, htn, hld, gillian, hypothyroid, depression check  Patient ID: Reena Goodpasture is a 56 y.o. female. Diabetes   She presents for her follow-up diabetic visit. She has type 2 diabetes mellitus. Her disease course has been stable. There are no hypoglycemic associated symptoms. There are no diabetic associated symptoms. Pertinent negatives for diabetes include no blurred vision, no chest pain, no fatigue, no foot paresthesias, no polydipsia, no polyphagia, no polyuria and no visual change. There are no hypoglycemic complications. Symptoms are stable. Diabetic complications include retinopathy. Pertinent negatives for diabetic complications include no peripheral neuropathy. Risk factors for coronary artery disease include dyslipidemia, diabetes mellitus, hypertension and sedentary lifestyle. Current diabetic treatment includes oral agent (dual therapy). She is compliant with treatment all of the time. Her weight is fluctuating minimally. She is following a generally unhealthy diet. When asked about meal planning, she reported none. She has had a previous visit with a dietitian. Exercise: 5 x wkly. She monitors blood glucose at home 1-2 x per day. Blood glucose monitoring compliance is excellent. Her breakfast blood glucose range is generally 140-180 mg/dl. Her overall blood glucose range is 140-180 mg/dl. An ACE inhibitor/angiotensin II receptor blocker is being taken. She does not see a podiatrist.Eye exam is current. Hypertension   This is a chronic problem. The current episode started more than 1 year ago. The problem is unchanged. The problem is controlled. Associated symptoms include peripheral edema. Pertinent negatives include no anxiety, blurred vision, chest pain, orthopnea, palpitations, PND or shortness of breath. There are no associated agents to hypertension. Risk factors for coronary artery disease include diabetes mellitus, obesity, post-menopausal state and dyslipidemia.  Past treatments include ACE inhibitors. The current treatment provides significant improvement. There are no compliance problems. Hypertensive end-organ damage includes retinopathy. Identifiable causes of hypertension include sleep apnea and a thyroid problem. Hyperlipidemia   This is a chronic problem. The current episode started more than 1 year ago. Exacerbating diseases include diabetes, hypothyroidism and obesity. There are no known factors aggravating her hyperlipidemia. Pertinent negatives include no chest pain, focal sensory loss, focal weakness, leg pain or shortness of breath. Current antihyperlipidemic treatment includes statins. Compliance problems include adherence to diet and psychosocial issues. Risk factors for coronary artery disease include diabetes mellitus, dyslipidemia, hypertension, obesity and post-menopausal.     Hypothyroid :  Chronic problem. Compliant with meds. Minimal constipation. Exercising regularly. No wt gain. Wt steady. No chest pain, shortness of breath or syncope. Diabetic retinopathy:  Followed by optho      Depression:  Chronic problem x yrs. Compliant with meds. Depression under good control with current medications. Wants to continue. Compulsive overeater. effexor helps. Wt stable. Asthma:  Well controlled on advair. Never uses rescue inhaler. Sleep apnea:  Using cpap daily    Has deep ridge on nail of r 1st finger. Now with some d/c from behind nail. Review of Systems   Constitutional: Negative for fatigue. Eyes: Negative for blurred vision and visual disturbance. Respiratory: Negative for cough, chest tightness and shortness of breath. Cardiovascular: Negative for chest pain, palpitations, orthopnea, leg swelling and PND. Endocrine: Negative for polydipsia, polyphagia and polyuria. Neurological: Negative for focal weakness, syncope and light-headedness. Prior to Visit Medications    Medication Sig Taking?  Authorizing Provider glipiZIDE (GLUCOTROL) 5 MG tablet Take 2 tablets by mouth 2 times daily (before meals)  Sarkis Squires, APRN - CNP   venlafaxine (EFFEXOR) 75 MG tablet Take 1 tablet by mouth daily  Sarkis Squires APRN - CNP   atorvastatin (LIPITOR) 10 MG tablet Take 1 tablet by mouth daily  Hina Alva, APRN - CNP   montelukast (SINGULAIR) 10 MG tablet Take 1 tablet by mouth nightly  Nadine Case MD   metFORMIN (GLUCOPHAGE) 500 MG tablet Take 1 tablet by mouth 2 times daily (with meals)  Nadine Case MD   lisinopril (PRINIVIL;ZESTRIL) 5 MG tablet Take 1 tablet by mouth daily  Nadine Case MD   levothyroxine (SYNTHROID) 137 MCG tablet Take 1 tablet by mouth Daily  DAYANA Pandey Che, MD   STEGLATRO 5 MG TABS TAKE ONE TABLET BY MOUTH DAILY  DAYANA Pandey Che, MD   sodium chloride (ALTAMIST SPRAY) 0.65 % nasal spray 1 spray by Nasal route as needed for Congestion  Sarkis Squires APRN - CNP   Ascorbic Acid (VITAMIN C) 250 MG tablet Take 4 tablets by mouth daily  Nadine Case MD   fluticasone (FLONASE) 50 MCG/ACT nasal spray 2 sprays by Nasal route nightly  Nadine Case MD   cetirizine (ZYRTEC) 10 MG tablet Take 1 tablet by mouth daily  Nadine Case MD   vitamin D (CHOLECALCIFEROL) 1000 units TABS tablet Take 1 tablet by mouth 2 times daily  Nadine Case MD   Flaxseed, Linseed, (FLAXSEED OIL MAX STR) 1300 MG CAPS Take 1 tablet by mouth daily  Nadine Case MD   Cinnamon 500 MG CAPS Take 1 capsule by mouth 2 times daily  Nadine Case MD   Multiple Vitamins-Minerals (VISION VITAMINS) TABS Take 1 tablet by mouth daily  Nadine Case MD   Turmeric Curcumin 500 MG CAPS Take 1 tablet by mouth 2 times daily  Nadine Case MD   B Complex-Folic Acid (SUPER B COMPLEX MAXI) TABS Take 1 tablet by mouth daily  Nadine Case MD   Magnesium 400 MG TABS Take 1 tablet by mouth daily  Nadine Case MD   psyllium 0.52 g capsule Take 1 capsule by atrophy of thyroid    8. Abnormality of shape of nail            Plan:          Linda Castellanos was seen today for diabetes. Diagnoses and all orders for this visit:    Type 2 diabetes mellitus without complication, without long-term current use of insulin ([facility]):  Stable, cont same meds  -     Comprehensive Metabolic Panel; Future  -     Lipid Panel; Future  -     TSH with Reflex; Future  -     POCT glycosylated hemoglobin (Hb A1C)    Diabetic retinopathy of right eye associated with type 2 diabetes mellitus, macular edema presence unspecified, unspecified retinopathy severity ([facility] [facility]):  Stable, cont same meds  -     Comprehensive Metabolic Panel; Future  -     Lipid Panel; Future  -     TSH with Reflex; Future  -     POCT glycosylated hemoglobin (Hb A1C)    Essential hypertension:  Stable, cont same meds  -     Comprehensive Metabolic Panel; Future    Mixed hyperlipidemia:  Check labs, adjust med prn  -     Comprehensive Metabolic Panel; Future  -     Lipid Panel; Future    Reactive depression:  Stable, cont same meds    LESLIE (obstructive sleep apnea):  cpap    Hypothyroidism due to acquired atrophy of thyroid:  Check labs, adjust med prn  -     TSH with Reflex; Future    Abnormality of shape of nail:  To derm.         3 months

## 2020-07-21 RX ORDER — LEVOTHYROXINE SODIUM 0.12 MG/1
125 TABLET ORAL DAILY
Qty: 90 TABLET | Refills: 3 | Status: SHIPPED | OUTPATIENT
Start: 2020-07-21 | End: 2021-07-06 | Stop reason: SDUPTHER

## 2020-07-27 RX ORDER — GLIPIZIDE 5 MG/1
10 TABLET ORAL
Qty: 360 TABLET | Refills: 3 | Status: SHIPPED | OUTPATIENT
Start: 2020-07-27 | End: 2021-07-26 | Stop reason: SDUPTHER

## 2020-08-04 ENCOUNTER — PATIENT MESSAGE (OUTPATIENT)
Dept: INTERNAL MEDICINE CLINIC | Age: 60
End: 2020-08-04

## 2020-08-04 NOTE — TELEPHONE ENCOUNTER
From: Nathan Mccabe  To: Steph León MD  Sent: 8/4/2020 10:26 AM EDT  Subject: Non-Urgent Medical Question    Hi Dr. Toan Capps,    May I please get a referral to see Dr. Olivia Weems (Dermatologist) through [facility]? Although my insurance says it doesn't need one, they require one. Thank you.
DISPLAY PLAN FREE TEXT

## 2020-09-03 RX ORDER — ERTUGLIFLOZIN 5 MG/1
5 TABLET, FILM COATED ORAL DAILY
Qty: 30 TABLET | Refills: 12 | Status: SHIPPED | OUTPATIENT
Start: 2020-09-03 | End: 2021-09-30 | Stop reason: SDUPTHER

## 2020-09-03 NOTE — TELEPHONE ENCOUNTER
Last office visit:07/20/2020    Future Appointments   Date Time Provider Pat Ndiayeisti   11/9/2020 11:00 AM DAYANA Clarke MD [facility]   12/18/2020 11:45 AM Raffi Hughes [facility]   2/1/2021 10:00 AM Len Rae MD [facility]

## 2020-11-09 ENCOUNTER — OFFICE VISIT (OUTPATIENT)
Dept: INTERNAL MEDICINE CLINIC | Age: 60
End: 2020-11-09
Payer: COMMERCIAL

## 2020-11-09 VITALS
BODY MASS INDEX: 48.47 KG/M2 | RESPIRATION RATE: 14 BRPM | WEIGHT: 293 LBS | SYSTOLIC BLOOD PRESSURE: 127 MMHG | HEART RATE: 70 BPM | OXYGEN SATURATION: 95 % | DIASTOLIC BLOOD PRESSURE: 81 MMHG | TEMPERATURE: 97.4 F

## 2020-11-09 LAB
ALBUMIN SERPL-MCNC: 4.4 G/DL (ref 3.4–5)
ALP BLD-CCNC: 84 U/L (ref 40–129)
ALT SERPL-CCNC: 34 U/L (ref 10–40)
AST SERPL-CCNC: 27 U/L (ref 15–37)
BILIRUB SERPL-MCNC: 0.4 MG/DL (ref 0–1)
BILIRUBIN DIRECT: <0.2 MG/DL (ref 0–0.3)
BILIRUBIN, INDIRECT: NORMAL MG/DL (ref 0–1)
CHOLESTEROL, TOTAL: 150 MG/DL (ref 0–199)
HBA1C MFR BLD: 6.9 %
HDLC SERPL-MCNC: 43 MG/DL (ref 40–60)
LDL CHOLESTEROL CALCULATED: 82 MG/DL
TOTAL PROTEIN: 7.2 G/DL (ref 6.4–8.2)
TRIGL SERPL-MCNC: 127 MG/DL (ref 0–150)
TSH REFLEX: 0.98 UIU/ML (ref 0.27–4.2)
VLDLC SERPL CALC-MCNC: 25 MG/DL

## 2020-11-09 PROCEDURE — 36415 COLL VENOUS BLD VENIPUNCTURE: CPT | Performed by: INTERNAL MEDICINE

## 2020-11-09 PROCEDURE — 99214 OFFICE O/P EST MOD 30 MIN: CPT | Performed by: INTERNAL MEDICINE

## 2020-11-09 PROCEDURE — 83036 HEMOGLOBIN GLYCOSYLATED A1C: CPT | Performed by: INTERNAL MEDICINE

## 2020-11-09 ASSESSMENT — ENCOUNTER SYMPTOMS
COUGH: 0
SHORTNESS OF BREATH: 0
BLURRED VISION: 0
CHEST TIGHTNESS: 0
ORTHOPNEA: 0
VISUAL CHANGE: 0

## 2020-11-09 NOTE — PROGRESS NOTES
Subjective:    cc:  dm, htn, hld, gillian, hypothyroid, depression check  Patient ID: Ilda Watson is a 56 y.o. female. Diabetes   She presents for her follow-up diabetic visit. She has type 2 diabetes mellitus. Her disease course has been stable. There are no hypoglycemic associated symptoms. There are no diabetic associated symptoms. Pertinent negatives for diabetes include no blurred vision, no chest pain, no fatigue, no foot paresthesias, no polydipsia, no polyphagia, no polyuria and no visual change. There are no hypoglycemic complications. Symptoms are stable. Diabetic complications include retinopathy. Pertinent negatives for diabetic complications include no peripheral neuropathy. Risk factors for coronary artery disease include dyslipidemia, diabetes mellitus, hypertension and sedentary lifestyle. Current diabetic treatment includes oral agent (triple therapy). She is compliant with treatment all of the time. Her weight is decreasing steadily. She is following a generally unhealthy diet. When asked about meal planning, she reported none. She has had a previous visit with a dietitian. Exercise: 5 x wkly. She monitors blood glucose at home 1-2 x per day. Blood glucose monitoring compliance is excellent. Her breakfast blood glucose range is generally 140-180 mg/dl. Her overall blood glucose range is 140-180 mg/dl. An ACE inhibitor/angiotensin II receptor blocker is being taken. She does not see a podiatrist.Eye exam is current. Hypertension   This is a chronic problem. The current episode started more than 1 year ago. The problem is unchanged. The problem is controlled. Associated symptoms include peripheral edema. Pertinent negatives include no anxiety, blurred vision, chest pain, orthopnea, palpitations, PND or shortness of breath. There are no associated agents to hypertension. Risk factors for coronary artery disease include diabetes mellitus, obesity, post-menopausal state and dyslipidemia.  Past treatments include ACE inhibitors. The current treatment provides significant improvement. There are no compliance problems. Hypertensive end-organ damage includes retinopathy. Identifiable causes of hypertension include sleep apnea and a thyroid problem. Hyperlipidemia   This is a chronic problem. The current episode started more than 1 year ago. The problem is controlled. Exacerbating diseases include diabetes, hypothyroidism and obesity. There are no known factors aggravating her hyperlipidemia. Pertinent negatives include no chest pain, focal sensory loss, focal weakness, leg pain or shortness of breath. Current antihyperlipidemic treatment includes statins. The current treatment provides significant improvement of lipids. Compliance problems include adherence to diet and psychosocial issues. Risk factors for coronary artery disease include diabetes mellitus, dyslipidemia, hypertension, obesity and post-menopausal.     Hypothyroid :  Chronic problem. Compliant with meds. Minimal constipation. Exercising regularly. No wt gain. Wt steady. No chest pain, shortness of breath or syncope. Diabetic retinopathy:  Followed by optho      Depression:  Chronic problem x yrs. Compliant with meds. Depression under good control with current medications. Wants to continue. Compulsive overeater. effexor helps. Wt stable. Asthma:  Well controlled on advair. Never uses rescue inhaler. Sleep apnea:  Using cpap daily        Review of Systems   Constitutional: Negative for fatigue and fever. Eyes: Negative for blurred vision and visual disturbance. Respiratory: Negative for cough, chest tightness and shortness of breath. Cardiovascular: Negative for chest pain, palpitations, orthopnea, leg swelling and PND. Endocrine: Negative for cold intolerance, heat intolerance, polydipsia, polyphagia and polyuria. Neurological: Negative for focal weakness, syncope and light-headedness.        Prior to Visit Medications    Medication Sig Taking?  Authorizing Provider   Ertugliflozin L-PyroglutamicAc (STEGLATRO) 5 MG TABS Take 5 mg by mouth daily Yes Kam Clayton MD   glipiZIDE (GLUCOTROL) 5 MG tablet Take 2 tablets by mouth 2 times daily (before meals) Yes Kam Clayton MD   levothyroxine (SYNTHROID) 125 MCG tablet Take 1 tablet by mouth daily Yes Kam Clayton MD   venlafaxine [facility]) 75 MG tablet Take 1 tablet by mouth daily Yes RAUL Ann CNP   atorvastatin (LIPITOR) 10 MG tablet Take 1 tablet by mouth daily Yes RAUL Ann CNP   montelukast (SINGULAIR) 10 MG tablet Take 1 tablet by mouth nightly Yes Kam Clayton MD   metFORMIN (GLUCOPHAGE) 500 MG tablet Take 1 tablet by mouth 2 times daily (with meals) Yes Kam Clayton MD   lisinopril (PRINIVIL;ZESTRIL) 5 MG tablet Take 1 tablet by mouth daily Yes Kam Clayton MD   sodium chloride (ALTAMIST SPRAY) 0.65 % nasal spray 1 spray by Nasal route as needed for Congestion Yes RAUL Ann CNP   Ascorbic Acid (VITAMIN C) 250 MG tablet Take 4 tablets by mouth daily Yes Kam Clayton MD   fluticasone (FLONASE) 50 MCG/ACT nasal spray 2 sprays by Nasal route nightly Yes Kam Clayton MD   cetirizine (ZYRTEC) 10 MG tablet Take 1 tablet by mouth daily Yes Kam Clayton MD   vitamin D (CHOLECALCIFEROL) 1000 units TABS tablet Take 1 tablet by mouth 2 times daily Yes Kam Clayton MD   Flaxseed, Linseed, (FLAXSEED OIL MAX STR) 1300 MG CAPS Take 1 tablet by mouth daily Yes Kam Clayton MD   Cinnamon 500 MG CAPS Take 1 capsule by mouth 2 times daily Yes Kam Clayton MD   Multiple Vitamins-Minerals (VISION VITAMINS) TABS Take 1 tablet by mouth daily Yes Kam Clayton MD   Turmeric Curcumin 500 MG CAPS Take 1 tablet by mouth 2 times daily Yes Kam Clayton MD   B Complex-Folic Acid (SUPER B COMPLEX MAXI) TABS Take 1 tablet by mouth daily Yes Liya Beltrán MD Feliz   Magnesium 400 MG TABS Take 1 tablet by mouth daily Yes Bert Escalante MD   psyllium 0.52 g capsule Take 1 capsule by mouth 4 times daily Yes Bert Escalante MD   aspirin 81 MG chewable tablet Take 81 mg by mouth daily Yes Historical Provider, MD   Omega-3 Fatty Acids (FISH OIL CONCENTRATE PO) Take by mouth Yes Historical Provider, MD   Multiple Vitamins-Minerals (THERAPEUTIC MULTIVITAMIN-MINERALS) tablet Take 1 tablet by mouth daily Yes Historical Provider, MD   Calcium Acetate, Phos Binder, (CALCIUM ACETATE PO) Take by mouth daily  Yes Historical Provider, MD       /81 (Site: Right Upper Arm, Position: Sitting, Cuff Size: Large Adult)   Pulse 70   Temp 97.4 °F (36.3 °C) (Infrared)   Resp 14   Wt (!) 328 lb 3.2 oz (148.9 kg)   LMP  (LMP Unknown)   SpO2 95%   Breastfeeding No   BMI 48.47 kg/m²     Objective:   Physical Exam   Constitutional: She is oriented to person, place, and time. She appears well-developed and well-nourished. No distress. Eyes: Pupils are equal, round, and reactive to light. Neck: No JVD present. No bruits   Cardiovascular: Normal rate, regular rhythm, normal heart sounds and intact distal pulses. Exam reveals no gallop and no friction rub. No murmur heard. Pulmonary/Chest: Effort normal and breath sounds normal. No respiratory distress. She has no wheezes. She has no rales. Musculoskeletal:         General: Edema present. Comments: Trace edema   Neurological: She is alert and oriented to person, place, and time. Monofilament normal bilat feet   Skin: Skin is warm and dry. She is not diaphoretic. No erythema. Psychiatric: She has a normal mood and affect. Her behavior is normal. Judgment and thought content normal.   Vitals reviewed. Assessment:      1. Type 2 diabetes mellitus without complication, without long-term current use of insulin ([address]    2.  Diabetic retinopathy of right eye associated with type 2 diabetes mellitus, macular edema presence unspecified, unspecified retinopathy severity ([address]    3. Essential hypertension    4. Mixed hyperlipidemia    5. Reactive depression    6. LESLIE (obstructive sleep apnea)    7. Hypothyroidism due to acquired atrophy of thyroid    8. Encounter for screening mammogram for malignant neoplasm of breast            Plan:      Abby Pendleton was seen today for diabetes, hypertension, hyperlipidemia and depression. Diagnoses and all orders for this visit:    Type 2 diabetes mellitus without complication, without long-term current use of insulin ([address]):  Stable, cont same meds  -     Hepatic Function Panel; Future  -     Lipid Panel; Future  -     TSH with Reflex; Future  -     POCT glycosylated hemoglobin (Hb A1C)  -      DIABETES FOOT EXAM    Diabetic retinopathy of right eye associated with type 2 diabetes mellitus, macular edema presence unspecified, unspecified retinopathy severity ([address]):  As above    Essential hypertension:  Stable, cont same meds    Mixed hyperlipidemia:  Check labs, adjust med prn  -     Hepatic Function Panel; Future  -     Lipid Panel; Future    Reactive depression:  Stable, cont same meds  -     TSH with Reflex; Future    LESLIE (obstructive sleep apnea):  cpap  -     TSH with Reflex; Future    Hypothyroidism due to acquired atrophy of thyroid:  Check labs, adjust med prn  -     TSH with Reflex; Future    Encounter for screening mammogram for malignant neoplasm of breast  -     [facility] DIGITAL SCREENING AUGMENTED BILATERAL;  Future      3 month with ileana, 6 months with me

## 2020-12-15 ENCOUNTER — OFFICE VISIT (OUTPATIENT)
Dept: INTERNAL MEDICINE CLINIC | Age: 60
End: 2020-12-15
Payer: COMMERCIAL

## 2020-12-15 ENCOUNTER — TELEPHONE (OUTPATIENT)
Dept: INTERNAL MEDICINE CLINIC | Age: 60
End: 2020-12-15

## 2020-12-15 VITALS
OXYGEN SATURATION: 98 % | HEART RATE: 70 BPM | SYSTOLIC BLOOD PRESSURE: 132 MMHG | WEIGHT: 293 LBS | DIASTOLIC BLOOD PRESSURE: 80 MMHG | TEMPERATURE: 97.4 F | RESPIRATION RATE: 18 BRPM | BODY MASS INDEX: 47.85 KG/M2

## 2020-12-15 PROCEDURE — 99213 OFFICE O/P EST LOW 20 MIN: CPT | Performed by: NURSE PRACTITIONER

## 2020-12-15 RX ORDER — SULFAMETHOXAZOLE AND TRIMETHOPRIM 800; 160 MG/1; MG/1
1 TABLET ORAL 2 TIMES DAILY
Qty: 20 TABLET | Refills: 0 | Status: SHIPPED | OUTPATIENT
Start: 2020-12-15 | End: 2020-12-25

## 2020-12-15 RX ORDER — POLYETHYLENE GLYCOL 3350 17 G/17G
17 POWDER, FOR SOLUTION ORAL DAILY PRN
COMMUNITY
End: 2021-05-12

## 2020-12-15 ASSESSMENT — ENCOUNTER SYMPTOMS
CONSTIPATION: 1
VOMITING: 0
TROUBLE SWALLOWING: 0
ABDOMINAL PAIN: 1
RECTAL PAIN: 0
BLOOD IN STOOL: 0
DIARRHEA: 0
NAUSEA: 0
COUGH: 0
ANAL BLEEDING: 0
SHORTNESS OF BREATH: 0
ABDOMINAL DISTENTION: 0

## 2020-12-15 NOTE — TELEPHONE ENCOUNTER
----- Message from Mando Joseph sent at 12/14/2020  5:18 PM EST -----  Subject: Appointment Request    Reason for Call: Urgent Abdominal Pain    QUESTIONS  Type of Appointment? Established Patient  Reason for appointment request? No appointments available during search  Additional Information for Provider? Patient is having stomach problems   she spoke with the office VIA Email and they said to have her come in   that they could see her Tuesday or Wednesday.   ---------------------------------------------------------------------------  --------------  CALL BACK INFO  What is the best way for the office to contact you? OK to leave message on   voicemail  Preferred Call Back Phone Number? [phone]  ---------------------------------------------------------------------------  --------------  SCRIPT ANSWERS  Relationship to Patient? Self  Appointment reason? Symptomatic  Select script based on patient symptoms? Adult Abdominal Pain [Belly Pain   Gut Pain   Tummy Pain   Stomach Pain   Diverticulitis   Diverticulosis   Appendix   Gallbladder ]  Are you currently experiencing pain? No  Is your pain affecting your daily activities? No  Has your pain started or worsened within the past 3 days? No  Are you having vomiting or diarrhea? No  Are you unable to eat or drink? No  Have you had blood in your stool or black stool? No  Are you having fevers (100.4)   chills or sweats? No  Have you previously seen a provider for this pain? No  Have you been diagnosed with   tested for   or told that you are suspected of having COVID-19 (Coronavirus)? No  Have you had a fever or taken medication to treat a fever within the past   3 days? No  Have you had a cough   shortness of breath or flu-like symptoms within the past 3 days? No  Do you currently have flu-like symptoms including fever or chills   cough   shortness of breath   or difficulty breathing   or new loss of taste or smell?  No  (Service Expert  click yes below to proceed with Addi Argueta As Usual   Scheduling)?  Yes

## 2020-12-15 NOTE — PATIENT INSTRUCTIONS
Patient Education        Constipation: Care Instructions  Your Care Instructions     Constipation means that you have a hard time passing stools (bowel movements). People pass stools from 3 times a day to once every 3 days. What is normal for you may be different. Constipation may occur with pain in the rectum and cramping. The pain may get worse when you try to pass stools. Sometimes there are small amounts of bright red blood on toilet paper or the surface of stools. This is because of enlarged veins near the rectum (hemorrhoids). A few changes in your diet and lifestyle may help you avoid ongoing constipation. Your doctor may also prescribe medicine to help loosen your stool. Some medicines can cause constipation. These include pain medicines and antidepressants. Tell your doctor about all the medicines you take. Your doctor may want to make a medicine change to ease your symptoms. Follow-up care is a key part of your treatment and safety. Be sure to make and go to all appointments, and call your doctor if you are having problems. It's also a good idea to know your test results and keep a list of the medicines you take. How can you care for yourself at home? · Drink plenty of fluids, enough so that your urine is light yellow or clear like water. If you have kidney, heart, or liver disease and have to limit fluids, talk with your doctor before you increase the amount of fluids you drink. · Include high-fiber foods in your diet each day. These include fruits, vegetables, beans, and whole grains. · Get at least 30 minutes of exercise on most days of the week. Walking is a good choice. You also may want to do other activities, such as running, swimming, cycling, or playing tennis or team sports. · Take a fiber supplement, such as Citrucel or Metamucil, every day. Read and follow all instructions on the label. · Schedule time each day for a bowel movement. A daily routine may help. Take your time having your bowel movement. · Support your feet with a small step stool when you sit on the toilet. This helps flex your hips and places your pelvis in a squatting position. · Your doctor may recommend an over-the-counter laxative to relieve your constipation. Examples are Milk of Magnesia and MiraLax. Read and follow all instructions on the label. Do not use laxatives on a long-term basis. When should you call for help? Call your doctor now or seek immediate medical care if:    · You have new or worse belly pain.     · You have new or worse nausea or vomiting.     · You have blood in your stools. Watch closely for changes in your health, and be sure to contact your doctor if:    · Your constipation is getting worse.     · You do not get better as expected. Where can you learn more? Go to [email or web address] org and sign in to your [employer] account. Enter 21 [phone] in the [employer]Apps box to learn more about \"Constipation: Care Instructions. \"     If you do not have an account, please click on the \"Sign Up Now\" link. Current as of: June 26, 2019               Content Version: 12.6  © 7611-8778 [employer]. Care instructions adapted under license by [facility] [place] ([facility]). If you have questions about a medical condition or this instruction, always ask your healthcare professional. [place] [address] any warranty or liability for your use of this information.          Patient Education        Skin Abscess: Care Instructions  Your Care Instructions

## 2020-12-15 NOTE — PROGRESS NOTES
12/15/2020     Bradley Lee (:  1960) is a 56 y.o. female, here for evaluation of the following medical concerns:    Chief Complaint   Patient presents with    Constipation    Abdominal Pain     for over 4 days, subsided alot over last 2 days    Pain     buttocks, may be boil, left side under       HPI  Abdominal Pain:  Patient reports that she has been having lower abdominal discomfort across her entire lower abd that intermittent and seems to correlate with a need for a BM the last 4 days. + gas reported. She also ate sourdough and that has a tendency to bother her stomach. She has been taking miralax on saturday and  and BNM went from hard pellets to softer and larger. She denies blood in stool , constant abd pain to RLQ, nausea or vomiting. NO fevers . She drinks 64 oz of fluid daily , takes fiber supplements. Hx of IBS - due for an colonoscopy soon. She has changed her exercise  routine since she is caring for her sister . Abscess:   left buttock area with redness, swelling, discomfort noted x 4 days. She has been using warm compresses. Has not noted any drainage , fevers , warmth to the area. She states that she gets these in buttock area lot and they resolve by themselves. Review of Systems   Constitutional: Negative for appetite change, chills, fatigue and fever. HENT: Negative for trouble swallowing. Respiratory: Negative for cough and shortness of breath. Cardiovascular: Negative for chest pain, palpitations and leg swelling. Gastrointestinal: Positive for abdominal pain and constipation. Negative for abdominal distention, anal bleeding, blood in stool, diarrhea, nausea, rectal pain and vomiting. Genitourinary: Negative for dysuria and pelvic pain. Musculoskeletal: Negative for myalgias. Skin: Positive for wound. Prior to Visit Medications    Medication Sig Taking?  Authorizing Provider polyethylene glycol (GLYCOLAX) 17 GM/SCOOP powder Take 17 g by mouth daily as needed Yes Irma Pereira MD   mupirocin (BACTROBAN) 2 % ointment Apply 3 times daily.  Yes RAUL Rucker CNP   sulfamethoxazole-trimethoprim (BACTRIM DS;SEPTRA DS) 800-160 MG per tablet Take 1 tablet by mouth 2 times daily for 10 days Yes RAUL Arreola CNP   Ertugliflozin L-PyroglutamicAc (STEGLATRO) 5 MG TABS Take 5 mg by mouth daily Yes Will Orourke MD   glipiZIDE (GLUCOTROL) 5 MG tablet Take 2 tablets by mouth 2 times daily (before meals) Yes Will Orourke MD   levothyroxine (SYNTHROID) 125 MCG tablet Take 1 tablet by mouth daily Yes Will Orourke MD   venlafaxine (EFFEXOR) 75 MG tablet Take 1 tablet by mouth daily Yes RAUL Chacon CNP   atorvastatin (LIPITOR) 10 MG tablet Take 1 tablet by mouth daily Yes RAUL Chacon CNP   montelukast (SINGULAIR) 10 MG tablet Take 1 tablet by mouth nightly Yes Will Orourke MD   metFORMIN (GLUCOPHAGE) 500 MG tablet Take 1 tablet by mouth 2 times daily (with meals) Yes Will Orourke MD   lisinopril (PRINIVIL;ZESTRIL) 5 MG tablet Take 1 tablet by mouth daily Yes Will Orourke MD   sodium chloride (ALTAMIST SPRAY) 0.65 % nasal spray 1 spray by Nasal route as needed for Congestion Yes RAUL Chacon CNP   Ascorbic Acid (VITAMIN C) 250 MG tablet Take 4 tablets by mouth daily Yes Will Orourke MD   fluticasone (FLONASE) 50 MCG/ACT nasal spray 2 sprays by Nasal route nightly Yes Will Orourke MD   cetirizine (ZYRTEC) 10 MG tablet Take 1 tablet by mouth daily Yes Will Orourke MD   vitamin D (CHOLECALCIFEROL) 1000 units TABS tablet Take 1 tablet by mouth 2 times daily Yes Will Orourke MD   Flaxseed, Linseed, (FLAXSEED OIL MAX STR) 1300 MG CAPS Take 1 tablet by mouth daily Yes Will Orourke MD   Cinnamon 500 MG CAPS Take 1 capsule by mouth 2 times daily Yes Will Orourke MD Multiple Vitamins-Minerals (VISION VITAMINS) TABS Take 1 tablet by mouth daily Yes Mkiy Smith MD   Turmeric Curcumin 500 MG CAPS Take 1 tablet by mouth 2 times daily Yes Miky Smith MD   B Complex-Folic Acid (SUPER B COMPLEX MAXI) TABS Take 1 tablet by mouth daily Yes Miky Smith MD   Magnesium 400 MG TABS Take 1 tablet by mouth daily Yes Miky Smith MD   psyllium 0.52 g capsule Take 1 capsule by mouth 4 times daily Yes Miky Smith MD   aspirin 81 MG chewable tablet Take 81 mg by mouth daily Yes Historical Provider, MD   Omega-3 Fatty Acids (FISH OIL CONCENTRATE PO) Take by mouth Yes Historical Provider, MD   Multiple Vitamins-Minerals (THERAPEUTIC MULTIVITAMIN-MINERALS) tablet Take 1 tablet by mouth daily Yes Historical Provider, MD   Calcium Acetate, Phos Binder, (CALCIUM ACETATE PO) Take by mouth daily  Yes Historical Provider, MD        Social History     Tobacco Use    Smoking status: Never Smoker    Smokeless tobacco: Never Used   Substance Use Topics    Alcohol use: No        Vitals:    12/15/20 1353   BP: 132/80   Site: Left Upper Arm   Position: Sitting   Cuff Size: Large Adult   Pulse: 70   Resp: 18   Temp: 97.4 °F (36.3 °C)   SpO2: 98%   Weight: (!) 324 lb (147 kg)     Estimated body mass index is 47.85 kg/m² as calculated from the following:    Height as of 7/20/20: 5' 9\" (1.753 m). Weight as of this encounter: 324 lb (147 kg). Physical Exam  Vitals signs reviewed. Constitutional:       General: She is not in acute distress. Appearance: Normal appearance. She is not ill-appearing, toxic-appearing or diaphoretic. HENT:      Head: Normocephalic and atraumatic. Cardiovascular:      Rate and Rhythm: Normal rate and regular rhythm. Pulses: Normal pulses. Heart sounds: Normal heart sounds. Pulmonary:      Effort: Pulmonary effort is normal.      Breath sounds: Normal breath sounds.    Abdominal: General: Bowel sounds are normal. There is no distension. Palpations: Abdomen is soft. There is no mass. Tenderness: There is no abdominal tenderness. There is no guarding. Hernia: No hernia is present. Musculoskeletal: Normal range of motion. Skin:     General: Skin is warm and dry. Findings: Lesion present. Neurological:      General: No focal deficit present. Mental Status: She is alert and oriented to person, place, and time. Psychiatric:         Mood and Affect: Mood normal.         Behavior: Behavior normal.         ASSESSMENT/PLAN:  1. Constipation, unspecified constipation type, acute   Cont miralax daily for rhe next week   Cont fiber pills  Increase fiber rich foods in diet  Increase fluid intake . Monitor for worsening s/sx of abdominal pain. If noted another eval or imaging may be needed. 2. Abscess of buttock, left, acute   Apply warm compresses TID  Clean area with soap and water and apply Bactroban tid. Monitor for worsening s/sx of worsening abscess that include drainage, increase pain , warmth , redness- if noted contact the office for further instructions.     - mupirocin (BACTROBAN) 2 % ointment; Apply 3 times daily. Dispense: 1 Tube; Refill: 1  - sulfamethoxazole-trimethoprim (BACTRIM DS;SEPTRA DS) 800-160 MG per tablet; Take 1 tablet by mouth 2 times daily for 10 days  Dispense: 20 tablet; Refill: 0      Return if symptoms worsen or fail to improve, for if nogt improving in the next 5 days , schedule an appt to be seen in the office . Terrance Mares All questions addresses and answered . Patient agrees with plan of care. An electronic signature was used to authenticate this note.     --RAUL Rollins - CNP on 12/15/2020 at 4:09 PM

## 2020-12-18 ENCOUNTER — OFFICE VISIT (OUTPATIENT)
Dept: ENT CLINIC | Age: 60
End: 2020-12-18
Payer: COMMERCIAL

## 2020-12-18 VITALS
OXYGEN SATURATION: 95 % | HEART RATE: 90 BPM | DIASTOLIC BLOOD PRESSURE: 78 MMHG | SYSTOLIC BLOOD PRESSURE: 128 MMHG | TEMPERATURE: 97.6 F

## 2020-12-18 PROCEDURE — 99213 OFFICE O/P EST LOW 20 MIN: CPT | Performed by: OTOLARYNGOLOGY

## 2020-12-18 NOTE — PROGRESS NOTES
Ashly      Patient Name: [id number] [address] Record Number:  <[id number]>  Primary Care Physician:  Son Mallory MD  Date of Consultation: 12/18/2020          BRIEF HISTORY OF PRESENT ILLNESS  [facility] is a(n) 56 y.o. female who presents for follow-up of her oral lichen planus. In March 2020 I biopsied the left buccal mucosa. She said that she does it note any changes in her mouth. She said she thinks the same as it always is. No other complaints today. Patient Active Problem List   Diagnosis    Type 2 diabetes mellitus without complication, without long-term current use of insulin ([address]    Mixed hyperlipidemia    Essential hypertension    Obstructive sleep apnea syndrome    GERD (gastroesophageal reflux disease)    Reactive depression    Compulsive overeater    DDD (degenerative disc disease), lumbar    Vitamin D deficiency    Pulmonary HTN ([address]    Obesity    Diabetic retinopathy of right eye associated with type 2 diabetes mellitus ([address]    Hypothyroidism due to acquired atrophy of thyroid     Past Surgical History:   Procedure Laterality Date    JOINT REPLACEMENT Bilateral     tkr    LASIK      REVISION TOTAL KNEE ARTHROPLASTY Right     TOTAL KNEE ARTHROPLASTY Bilateral     WISDOM TOOTH EXTRACTION       Family History   Problem Relation Age of Onset    Other Mother         emphysema, subdural hematoma?     Heart Disease Father     Diabetes Father     Diabetes Maternal Grandmother     Heart Disease Maternal Grandmother     Thyroid Disease Maternal Grandmother      Social History     Socioeconomic History    Marital status: Single     Spouse name: Not on file    Number of children: Not on file    Years of education: Not on file    Highest education level: Not on file   Occupational History    Occupation: not working right now     Comment: looking for a part time job   Social Needs  Financial resource strain: Not hard at all   Club [employer] insecurity     Worry: Never true     Inability: Never true    Transportation needs     Medical: No     Non-medical: No   Tobacco Use    Smoking status: Never Smoker    Smokeless tobacco: Never Used   Substance and Sexual Activity    Alcohol use: No    Drug use: No    Sexual activity: Not Currently   Lifestyle    Physical activity     Days per week: Not on file     Minutes per session: Not on file    Stress: Not on file   Relationships    Social connections     Talks on phone: Not on file     Gets together: Not on file     Attends Bahai service: Not on file     Active member of club or organization: Not on file     Attends meetings of clubs or organizations: Not on file     Relationship status: Not on file    Intimate partner violence     Fear of current or ex partner: Not on file     Emotionally abused: Not on file     Physically abused: Not on file     Forced sexual activity: Not on file   Other Topics Concern    Not on file   Social History Narrative    Not on file       DRUG/FOOD ALLERGIES: Penicillins    CURRENT MEDICATIONS  Prior to Admission medications    Medication Sig Start Date End Date Taking? Authorizing Provider   polyethylene glycol (GLYCOLAX) 17 GM/SCOOP powder Take 17 g by mouth daily as needed    Historical Provider, MD   mupirocin (BACTROBAN) 2 % ointment Apply 3 times daily.  12/15/20 12/22/20  RAUL Rucker CNP   sulfamethoxazole-trimethoprim (BACTRIM DS;SEPTRA DS) 800-160 MG per tablet Take 1 tablet by mouth 2 times daily for 10 days 12/15/20 12/25/20  RAUL Sykes CNP   Ertugliflozin L-PyroglutamicAc (STEGLATRO) 5 MG TABS Take 5 mg by mouth daily 9/3/20   Rosi Graves MD   glipiZIDE (GLUCOTROL) 5 MG tablet Take 2 tablets by mouth 2 times daily (before meals) 7/27/20   Rosi Graves MD   levothyroxine (SYNTHROID) 125 MCG tablet Take 1 tablet by mouth daily 7/21/20   M Loring Dubin, MD venlafaxine (EFFEXOR) 75 MG tablet Take 1 tablet by mouth daily 4/14/20   [facility]   atorvastatin (LIPITOR) 10 MG tablet Take 1 tablet by mouth daily 4/14/20   [facility]   montelukast (SINGULAIR) 10 MG tablet Take 1 tablet by mouth nightly 2/24/20   Scarlett Devine MD   metFORMIN (GLUCOPHAGE) 500 MG tablet Take 1 tablet by mouth 2 times daily (with meals) 1/20/20   Scarlett Devine MD   lisinopril (PRINIVIL;ZESTRIL) 5 MG tablet Take 1 tablet by mouth daily 12/27/19   Scarlett Devine MD   sodium chloride (ALTAMIST SPRAY) 0.65 % nasal spray 1 spray by Nasal route as needed for Congestion 4/6/18   [facility], APRN - CNP   Ascorbic Acid (VITAMIN C) 250 MG tablet Take 4 tablets by mouth daily 3/23/18   Scarlett Devine MD   fluticasone [facility]) 50 MCG/ACT nasal spray 2 sprays by Nasal route nightly 3/23/18   Scarlett Devine MD   cetirizine (ZYRTEC) 10 MG tablet Take 1 tablet by mouth daily 3/23/18   Scarlett Devine MD   vitamin D (CHOLECALCIFEROL) 1000 units TABS tablet Take 1 tablet by mouth 2 times daily 3/23/18   Scarlett Devine MD   Flaxseed, Linseed, (FLAXSEED OIL MAX STR) 1300 MG CAPS Take 1 tablet by mouth daily 3/23/18   Scarlett Devine MD   Cinnamon 500 MG CAPS Take 1 capsule by mouth 2 times daily 3/23/18   Scarlett Devine MD   Multiple Vitamins-Minerals (VISION VITAMINS) TABS Take 1 tablet by mouth daily 3/23/18   Scarlett Devine MD   Turmeric Curcumin 500 MG CAPS Take 1 tablet by mouth 2 times daily 3/23/18   Scarlett Devine MD   B Complex-Folic Acid (SUPER B COMPLEX MAXI) TABS Take 1 tablet by mouth daily 3/23/18   Scarlett Devine MD   Magnesium 400 MG TABS Take 1 tablet by mouth daily 3/23/18   Scarlett Devine MD   psyllium 0.52 g capsule Take 1 capsule by mouth 4 times daily 3/23/18   Scarlett Devine MD   aspirin 81 MG chewable tablet Take 81 mg by mouth daily    Historical Provider, MD Omega-3 Fatty Acids (FISH OIL CONCENTRATE PO) Take by mouth    Historical Provider, MD   Multiple Vitamins-Minerals (THERAPEUTIC MULTIVITAMIN-MINERALS) tablet Take 1 tablet by mouth daily    Historical Provider, MD   Calcium Acetate, Phos Binder, (CALCIUM ACETATE PO) Take by mouth daily     Historical Provider, MD       REVIEW OF SYSTEMS  The following systems were reviewed and revealed the following in addition to any already discussed in the HPI:    CONSTITUTIONAL: no weight loss, no fever, no night sweats, no chills  EYES: no vision changes, no blurry vision  EARS: no changes in hearing, no otalgia  NOSE: no epistaxis, no rhinorrhea  RESPIRATORY: no  Difficulty breathing, no shortness of breath  CV: no chest pain, no Peripheral vascular disease  HEME: No coagulation disorder, no Bleeding disorder  NEURO: no TIA or stroke-like symptoms  SKIN: No new rashes in the head and neck, no recent skin cancers  MOUTH: Lichen planus  GASTROINTESTINAL: No diarrhea, stomach pain  PSYCH: No anxiety, no depression      PHYSICAL EXAM  /78 (Site: Right Lower Arm, Position: Sitting, Cuff Size: Large Adult)   Pulse 90   Temp 97.6 °F (36.4 °C) (Temporal)   LMP  (LMP Unknown)   SpO2 95%     GENERAL: No Acute Distress, Alert and Oriented, no Hoarseness, strong voice  EYES: EOMI, Anti-icteric  HENT:   Head: Normocephalic and atraumatic.    Face:  Symmetric, facial nerve intact, no sinus tenderness  Right Ear: Normal external ear, normal external auditory canal, intact tympanic membrane with normal mobility and aerated middle ear  Left Ear: Normal external ear, normal external auditory canal, intact tympanic membrane with normal mobility and aerated middle ear Mouth/Oral Cavity: Patient has approximately 2 to 3 cm area of erythema and linear streaks consistent with lichen planus in the left buccal mucosa. There is a much smaller area in the same region on the right side. This seems to be essentially unchanged when compared to last exam.  Oropharynx/Larynx:  normal oropharynx, normal tonsils; normal larynx/nasopharynx on mirror exam  Nose:Normal external nasal appearance. Anterior rhinoscopy shows a mild rightward deviated septum. normal turbinates. Normal mucosa   NECK: Normal range of motion, no thyromegaly, trachea is midline, no lymphadenopathy, no neck masses, no crepitus  CHEST: Normal respiratory effort, no retractions, breathing comfortably  SKIN: No rashes, normal appearing skin, no evidence of skin lesions/tumors  Neuro:  cranial nerve II-XII intact; normal gait  Cardio:  no edema      ASSESSMENT/PLAN  1. Oral lichen planus  This appears to be stable. Still think we should have her follow-up every 6 months or so for evaluation. She understands that if she notes any changes prior to that time, I would like to see her immediately. I have performed a head and neck physical exam personally or was physically present during the key or critical portions of the service. Medical Decision Making:   The following items were considered in medical decision making:  Independent review of images  Review / order clinical lab tests  Review / order radiology tests  Decision to obtain old records

## 2020-12-22 ENCOUNTER — NURSE TRIAGE (OUTPATIENT)
Dept: OTHER | Facility: CLINIC | Age: 60
End: 2020-12-22

## 2020-12-22 NOTE — TELEPHONE ENCOUNTER
Patient called pre-service center [facility]) Deborah with red flag complaint. Brief description of triage: headache/medication reaction    Triage indicates for patient to home care    Care advice provided, patient verbalizes understanding; denies any other questions or concerns; instructed to call back for any new or worsening symptoms. Writer provided care advice per protocol    Attention Provider: Thank you for allowing me to participate in the care of your patient. The patient was connected to triage in response to information provided to the [facility]. Please do not respond through this encounter as the response is not directed to a shared pool. Reason for Disposition   Mild-moderate headache    Answer Assessment - Initial Assessment Questions  1. LOCATION: \"Where does it hurt? \"       Around ears, but ears feel plugged, around forehead, sharp pains around temple    2. ONSET: \"When did the headache start? \" (Minutes, hours or days)       Yesterday morning    3. PATTERN: \"Does the pain come and go, or has it been constant since it started? \"      Constant with changes in severity    4. SEVERITY: \"How bad is the pain? \" and \"What does it keep you from doing? \"  (e.g., Scale 1-10; mild, moderate, or severe)    - MILD (1-3): doesn't interfere with normal activities     - MODERATE (4-7): interferes with normal activities or awakens from sleep     - SEVERE (8-10): excruciating pain, unable to do any normal activities         6/10    5. RECURRENT SYMPTOM: \"Have you ever had headaches before? \" If so, ask: \"When was the last time? \" and \"What happened that time? \"       No    6. CAUSE: \"What do you think is causing the headache? \"      Reaction to bactrim    7. MIGRAINE: \"Have you been diagnosed with migraine headaches? \" If so, ask: \"Is this headache similar? \"       No    8. HEAD INJURY: \"Has there been any recent injury to the head? \"       No    9. OTHER SYMPTOMS: \"Do you have any other symptoms? \" (fever, stiff neck, eye pain, sore throat, cold symptoms)      Ear pain, eye pain, nausea, vomiting    10. PREGNANCY: \"Is there any chance you are pregnant? \" \"When was your last menstrual period? \"        N/A    Protocols used: HEADACHE-ADULT-[place]

## 2021-01-02 DIAGNOSIS — I10 ESSENTIAL HYPERTENSION: ICD-10-CM

## 2021-01-04 RX ORDER — LISINOPRIL 5 MG/1
5 TABLET ORAL DAILY
Qty: 90 TABLET | Refills: 3 | Status: SHIPPED | OUTPATIENT
Start: 2021-01-04 | End: 2022-01-03 | Stop reason: SDUPTHER

## 2021-01-11 RX ORDER — VENLAFAXINE 75 MG/1
75 TABLET ORAL DAILY
Qty: 90 TABLET | Refills: 3 | Status: SHIPPED | OUTPATIENT
Start: 2021-01-11 | End: 2022-01-03 | Stop reason: SDUPTHER

## 2021-01-25 NOTE — TELEPHONE ENCOUNTER
Last ov 12/15/20    Future Appointments   Date Time Provider Pat Vaughan   2/1/2021 10:00 AM MD Loulou Victor [facility]   2/11/2021 11:00 AM Neela Trinh, [address] [facility]   5/12/2021 11:00 AM DAYANA Nazario MD [facility]   6/18/2021 11:45 AM Ana Cristina Ugalde, [address] [facility]

## 2021-02-01 ENCOUNTER — OFFICE VISIT (OUTPATIENT)
Dept: DERMATOLOGY | Age: 61
End: 2021-02-01
Payer: COMMERCIAL

## 2021-02-01 VITALS — TEMPERATURE: 96.9 F

## 2021-02-01 DIAGNOSIS — L73.2 HIDRADENITIS SUPPURATIVA: ICD-10-CM

## 2021-02-01 DIAGNOSIS — L60.3 NAIL DYSTROPHY: ICD-10-CM

## 2021-02-01 DIAGNOSIS — L81.4 SOLAR LENTIGO: Primary | ICD-10-CM

## 2021-02-01 PROCEDURE — 99203 OFFICE O/P NEW LOW 30 MIN: CPT | Performed by: DERMATOLOGY

## 2021-02-01 RX ORDER — CLINDAMYCIN PHOSPHATE 11.9 MG/ML
SOLUTION TOPICAL
Qty: 60 ML | Refills: 2 | Status: SHIPPED | OUTPATIENT
Start: 2021-02-01

## 2021-02-01 NOTE — PROGRESS NOTES
[facility]  Loni Bridges MD  [address] [place] [address] Blvd  1960    59 y.o. female     Date of Visit: 2/1/2021    Chief Complaint: nail problem    History of Present Illness:    1. She reports a stable asymptomatic lesion on the right leg. 2.  She also complains of about a 20-year history of a depression on the right index finger nail that is asymptomatic. 3.  She also reports a few year history of recurrent tender erythematous nodules on the buttocks and proximal inner thighs. They come and go. She has a history of oral lichen planus. Review of Systems:  Gen: Feels well, good sense of health. Skin: No new or changing moles. Past Medical History, Family History, Surgical History, Medications and Allergies reviewed. Past Medical History:   Diagnosis Date    Allergic rhinitis     Asthma     Compulsive overeater     DDD (degenerative disc disease), lumbar     Depression     Diabetes mellitus (HCC)     Diabetic retinopathy ([address]     GERD (gastroesophageal reflux disease)     Hyperlipidemia     Hypertension     Hypothyroidism     Obesity     Pulmonary HTN ([address]     Sleep apnea     cpap    Thyroid nodule     Type 2 diabetes mellitus without complication ([address]     Vitamin D deficiency      Past Surgical History:   Procedure Laterality Date    JOINT REPLACEMENT Bilateral     tkr    LASIK      REVISION TOTAL KNEE ARTHROPLASTY Right     TOTAL KNEE ARTHROPLASTY Bilateral     WISDOM TOOTH EXTRACTION         Allergies   Allergen Reactions    Bactrim [Sulfamethoxazole-Trimethoprim] Nausea Only     Nausea, headache    Penicillins Rash     Outpatient Medications Marked as Taking for the 2/1/21 encounter (Office Visit) with Mar Roberto MD   Medication Sig Dispense Refill    clindamycin (CLEOCIN T) 1 % external solution Apply to affected areas on the thighs and buttocks twice daily as needed.  60 mL 2  metFORMIN (GLUCOPHAGE) 500 MG tablet Take 1 tablet by mouth 2 times daily (with meals) 180 tablet 3    venlafaxine (EFFEXOR) 75 MG tablet Take 1 tablet by mouth daily 90 tablet 3    lisinopril (PRINIVIL;ZESTRIL) 5 MG tablet Take 1 tablet by mouth daily 90 tablet 3    polyethylene glycol (GLYCOLAX) 17 GM/SCOOP powder Take 17 g by mouth daily as needed      Ertugliflozin L-PyroglutamicAc (STEGLATRO) 5 MG TABS Take 5 mg by mouth daily 30 tablet 12    glipiZIDE (GLUCOTROL) 5 MG tablet Take 2 tablets by mouth 2 times daily (before meals) 360 tablet 3    levothyroxine (SYNTHROID) 125 MCG tablet Take 1 tablet by mouth daily 90 tablet 3    atorvastatin (LIPITOR) 10 MG tablet Take 1 tablet by mouth daily 90 tablet 2    montelukast (SINGULAIR) 10 MG tablet Take 1 tablet by mouth nightly 90 tablet 3    sodium chloride (ALTAMIST SPRAY) 0.65 % nasal spray 1 spray by Nasal route as needed for Congestion 1 Bottle 3    Ascorbic Acid (VITAMIN C) 250 MG tablet Take 4 tablets by mouth daily 30 tablet 0    fluticasone (FLONASE) 50 MCG/ACT nasal spray 2 sprays by Nasal route nightly 1 Bottle 0    cetirizine (ZYRTEC) 10 MG tablet Take 1 tablet by mouth daily 30 tablet 0    vitamin D (CHOLECALCIFEROL) 1000 units TABS tablet Take 1 tablet by mouth 2 times daily 30 tablet     Flaxseed, Linseed, (FLAXSEED OIL MAX STR) 1300 MG CAPS Take 1 tablet by mouth daily 30 capsule 0    Cinnamon 500 MG CAPS Take 1 capsule by mouth 2 times daily 30 capsule 0    Multiple Vitamins-Minerals (VISION VITAMINS) TABS Take 1 tablet by mouth daily 30 tablet 0    Turmeric Curcumin 500 MG CAPS Take 1 tablet by mouth 2 times daily 30 capsule 0    B Complex-Folic Acid (SUPER B COMPLEX MAXI) TABS Take 1 tablet by mouth daily 30 tablet 0    Magnesium 400 MG TABS Take 1 tablet by mouth daily 30 tablet 0    aspirin 81 MG chewable tablet Take 81 mg by mouth daily      Omega-3 Fatty Acids (FISH OIL CONCENTRATE PO) Take by mouth  Multiple Vitamins-Minerals (THERAPEUTIC MULTIVITAMIN-MINERALS) tablet Take 1 tablet by mouth daily      Calcium Acetate, Phos Binder, (CALCIUM ACETATE PO) Take by mouth daily            Physical Examination       Well appearing. 1.  Right lateral leg - well defined round smooth light brown macule. 2.  Right index finger, medially - 2 to 3 mm wide longitudinal depression. Slight swelling of the proximal nail fold proximal to the depression. 3.  Proximal inner thighs and left lower buttock with several round violaceous to brown scars. No nodules visible today. Assessment and Plan     1. Solar lentigo     Monitor for change. 2. 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 Asymptomatic for now. Patient to call if the depression worsens or develops a larger swelling on the proximal nail fold. 3. Hidradenitis suppurativa - [place] stage 1    Clindamycin solution twice daily to affected areas. Return in about 1 year (around 2/1/2022).     --Gregg Mattson MD

## 2021-02-23 ENCOUNTER — TELEPHONE (OUTPATIENT)
Dept: INTERNAL MEDICINE CLINIC | Age: 61
End: 2021-02-23

## 2021-02-25 ENCOUNTER — HOSPITAL ENCOUNTER (OUTPATIENT)
Dept: WOMENS IMAGING | Age: 61
Discharge: HOME | End: 2021-02-25
Payer: COMMERCIAL

## 2021-02-25 DIAGNOSIS — Z12.31 BREAST CANCER SCREENING BY MAMMOGRAM: ICD-10-CM

## 2021-02-25 PROCEDURE — 77063 BREAST TOMOSYNTHESIS BI: CPT

## 2021-03-02 ENCOUNTER — PATIENT MESSAGE (OUTPATIENT)
Dept: INTERNAL MEDICINE CLINIC | Age: 61
End: 2021-03-02

## 2021-03-02 ENCOUNTER — OFFICE VISIT (OUTPATIENT)
Dept: INTERNAL MEDICINE CLINIC | Age: 61
End: 2021-03-02
Payer: COMMERCIAL

## 2021-03-02 VITALS
DIASTOLIC BLOOD PRESSURE: 78 MMHG | HEART RATE: 58 BPM | SYSTOLIC BLOOD PRESSURE: 138 MMHG | WEIGHT: 293 LBS | TEMPERATURE: 96.9 F | OXYGEN SATURATION: 99 % | BODY MASS INDEX: 47.26 KG/M2

## 2021-03-02 DIAGNOSIS — E03.4 HYPOTHYROIDISM DUE TO ACQUIRED ATROPHY OF THYROID: ICD-10-CM

## 2021-03-02 DIAGNOSIS — E78.2 MIXED HYPERLIPIDEMIA: ICD-10-CM

## 2021-03-02 DIAGNOSIS — I10 ESSENTIAL HYPERTENSION: ICD-10-CM

## 2021-03-02 DIAGNOSIS — E11.9 TYPE 2 DIABETES MELLITUS WITHOUT COMPLICATION, WITHOUT LONG-TERM CURRENT USE OF INSULIN (HCC): Primary | ICD-10-CM

## 2021-03-02 LAB
CREATININE URINE: 58.7 MG/DL (ref 28–259)
HBA1C MFR BLD: 7.3 %
MICROALBUMIN UR-MCNC: <1.2 MG/DL
MICROALBUMIN/CREAT UR-RTO: NORMAL MG/G (ref 0–30)

## 2021-03-02 PROCEDURE — 3051F HG A1C>EQUAL 7.0%<8.0%: CPT | Performed by: NURSE PRACTITIONER

## 2021-03-02 PROCEDURE — 83036 HEMOGLOBIN GLYCOSYLATED A1C: CPT | Performed by: NURSE PRACTITIONER

## 2021-03-02 PROCEDURE — 99214 OFFICE O/P EST MOD 30 MIN: CPT | Performed by: NURSE PRACTITIONER

## 2021-03-02 RX ORDER — MONTELUKAST SODIUM 10 MG/1
10 TABLET ORAL NIGHTLY
Qty: 90 TABLET | Refills: 3 | Status: SHIPPED | OUTPATIENT
Start: 2021-03-02 | End: 2022-03-07 | Stop reason: SDUPTHER

## 2021-03-02 RX ORDER — ATORVASTATIN CALCIUM 10 MG/1
10 TABLET, FILM COATED ORAL DAILY
Qty: 90 TABLET | Refills: 3 | Status: SHIPPED | OUTPATIENT
Start: 2021-03-02 | End: 2022-03-21 | Stop reason: SDUPTHER

## 2021-03-02 ASSESSMENT — ENCOUNTER SYMPTOMS
TROUBLE SWALLOWING: 0
SHORTNESS OF BREATH: 0
WHEEZING: 0
COUGH: 0

## 2021-03-02 NOTE — TELEPHONE ENCOUNTER
Last office visit : 03/02/2020 with ileana     Future Appointments   Date Time Provider Pat Alexus   5/12/2021 11:00 AM Chantell Perry MD [address]   6/18/2021 11:45 AM Mick Burkitt, [address] [facility]   6/2/2022  1:15 PM MD Roland Stpehens [facility]

## 2021-03-02 NOTE — TELEPHONE ENCOUNTER
From: Liberty Gutierrez  To: Farzana Perez MD  Sent: 3/2/2021 3:33 PM EST  Subject: Prescription Question    Hi Dr. Hilary Villanueva,    I need to refill my Atorvastatin, but [facility] says I can't do it there. Could you have someone call in a refill to the [address] [place] [address] on [facility]?     Many thanks,  [facility]

## 2021-03-02 NOTE — PROGRESS NOTES
3/2/2021     Orlin Ruelas (:  1960) is a 59 y.o. female, here for evaluation of the following medical concerns:    Chief Complaint   Patient presents with    3 Month Follow-Up     pt is here for a 3 mo f/u     States that she has been caring for her sister that has stage IV cancer of the bladder. She also states that she knows her diet is not perfect she eats  candy  and is aware that it is not appropriate for her diabetes. She does state that [facility] and does water aerobics 3 days a week the other 2 she is on a recumbent bike and is trying to lose weight. She is aware that her weight is down 3 pounds. Hypertension  This is a chronic problem. The current episode started more than 1 year ago. The problem has been resolved since onset. The problem is controlled. Pertinent negatives include no anxiety, chest pain, headaches, palpitations, peripheral edema or shortness of breath. Diabetes  She presents for her follow-up diabetic visit. She has type 2 diabetes mellitus. There are no hypoglycemic associated symptoms. Pertinent negatives for hypoglycemia include no dizziness, headaches or nervousness/anxiousness. Pertinent negatives for diabetes include no chest pain and no fatigue. There are no hypoglycemic complications. Symptoms are stable. There are no diabetic complications. Risk factors for coronary artery disease include diabetes mellitus, dyslipidemia and hypertension. Current diabetic treatment includes oral agent (dual therapy). She is compliant with treatment all of the time. Her weight is decreasing steadily. She is following a generally unhealthy diet. She participates in exercise daily. Her breakfast blood glucose is taken between 7-8 am. Her breakfast blood glucose range is generally 140-180 mg/dl. An ACE inhibitor/angiotensin II receptor blocker is being taken. She sees a podiatrist.Eye exam is current.    Hyperlipidemia This is a chronic problem. The current episode started more than 1 year ago. The problem is controlled. Exacerbating diseases include diabetes. There are no known factors aggravating her hyperlipidemia. Pertinent negatives include no chest pain, focal sensory loss, focal weakness, leg pain, myalgias or shortness of breath. Current antihyperlipidemic treatment includes statins. Hypothyroidism - denies tremors, palpation , anxiety , heat or cold intolerance or hair loss . Tolerating medications as prescribed. Review of Systems   Constitutional: Negative for chills, fatigue and fever. HENT: Negative for trouble swallowing. Eyes: Negative for visual disturbance. Respiratory: Negative for cough, shortness of breath and wheezing. Cardiovascular: Positive for leg swelling (normal ( leaky veins) ). Negative for chest pain and palpitations. Endocrine: Negative for cold intolerance and heat intolerance. Genitourinary: Negative for dysuria. Musculoskeletal: Negative for myalgias. Neurological: Negative for dizziness, focal weakness, syncope and headaches. Psychiatric/Behavioral: The patient is not nervous/anxious. Prior to Visit Medications    Medication Sig Taking? Authorizing Provider   clindamycin (CLEOCIN T) 1 % external solution Apply to affected areas on the thighs and buttocks twice daily as needed.   Joe Lowe MD   metFORMIN (GLUCOPHAGE) 500 MG tablet Take 1 tablet by mouth 2 times daily (with meals)  Eileen Taylor MD   venlafaxine [facility]) 75 MG tablet Take 1 tablet by mouth daily  Eileen Taylor MD   lisinopril (PRINIVIL;ZESTRIL) 5 MG tablet Take 1 tablet by mouth daily  Eileen Taylor MD   polyethylene glycol [facility]) 17 GM/SCOOP powder Take 17 g by mouth daily as needed  Historical Provider, MD   Ertugliflozin L-PyroglutamicAc (STEGLATRO) 5 MG TABS Take 5 mg by mouth daily  Eileen Taylor MD glipiZIDE (GLUCOTROL) 5 MG tablet Take 2 tablets by mouth 2 times daily (before meals)  Kris Verde MD   levothyroxine (SYNTHROID) 125 MCG tablet Take 1 tablet by mouth daily  Kris Verde MD   atorvastatin (LIPITOR) 10 MG tablet Take 1 tablet by mouth daily  Oskar Rebolledo, APRN - CNP   montelukast (SINGULAIR) 10 MG tablet Take 1 tablet by mouth nightly  Kris Verde MD   sodium chloride (ALTAMIST SPRAY) 0.65 % nasal spray 1 spray by Nasal route as needed for Congestion  Oskar Rebolledo, APRN - CNP   Ascorbic Acid (VITAMIN C) 250 MG tablet Take 4 tablets by mouth daily  Kris Verde MD   fluticasone (FLONASE) 50 MCG/ACT nasal spray 2 sprays by Nasal route nightly  Kris Verde MD   cetirizine (ZYRTEC) 10 MG tablet Take 1 tablet by mouth daily  Kris Verde MD   vitamin D (CHOLECALCIFEROL) 1000 units TABS tablet Take 1 tablet by mouth 2 times daily  Kris Verde MD   Flaxseed, Linseed, (FLAXSEED OIL MAX STR) 1300 MG CAPS Take 1 tablet by mouth daily  Kris Verde MD   Cinnamon 500 MG CAPS Take 1 capsule by mouth 2 times daily  Kris Verde MD   Multiple Vitamins-Minerals (VISION VITAMINS) TABS Take 1 tablet by mouth daily  Kris Verde MD   Turmeric Curcumin 500 MG CAPS Take 1 tablet by mouth 2 times daily  Kris Verde MD   B Complex-Folic Acid (SUPER B COMPLEX MAXI) TABS Take 1 tablet by mouth daily  Kris Verde MD   Magnesium 400 MG TABS Take 1 tablet by mouth daily  Kris Verde MD   aspirin 81 MG chewable tablet Take 81 mg by mouth daily  Historical Provider, MD   Omega-3 Fatty Acids (FISH OIL CONCENTRATE PO) Take by mouth  Historical Provider, MD   Multiple Vitamins-Minerals (THERAPEUTIC MULTIVITAMIN-MINERALS) tablet Take 1 tablet by mouth daily  Historical Provider, MD   Calcium Acetate, Phos Binder, (CALCIUM ACETATE PO) Take by mouth daily   Historical Provider, MD        Social History     Tobacco Use  Smoking status: Never Smoker    Smokeless tobacco: Never Used   Substance Use Topics    Alcohol use: No        Vitals:    03/02/21 1302   BP: 138/78   Pulse: 58   Temp: 96.9 °F (36.1 °C)   SpO2: 99%   Weight: (!) 320 lb (145.2 kg)     Estimated body mass index is 47.26 kg/m² as calculated from the following:    Height as of 7/20/20: 5' 9\" (1.753 m). Weight as of this encounter: 320 lb (145.2 kg). Physical Exam  Vitals signs reviewed. Constitutional:       General: She is not in acute distress. Appearance: Normal appearance. She is obese. She is not ill-appearing, toxic-appearing or diaphoretic. HENT:      Head: Normocephalic. Neck:      Musculoskeletal: Normal range of motion and neck supple. Vascular: No carotid bruit. Cardiovascular:      Rate and Rhythm: Normal rate and regular rhythm. Pulses: Normal pulses. Heart sounds: Normal heart sounds. Pulmonary:      Effort: Pulmonary effort is normal.      Breath sounds: Normal breath sounds. Abdominal:      General: Bowel sounds are normal. There is no distension. Palpations: Abdomen is soft. There is no mass. Tenderness: There is no abdominal tenderness. Hernia: No hernia is present. Musculoskeletal: Normal range of motion. Right lower leg: Edema present. Left lower leg: Edema present. Feet:      Right foot:      Protective Sensation: 7 sites tested. 7 sites sensed. Skin integrity: Callus and dry skin present. No ulcer or skin breakdown. Toenail Condition: Right toenails are normal.      Left foot:      Protective Sensation: 7 sites tested. 7 sites sensed. Skin integrity: Callus and dry skin present. No ulcer or skin breakdown. Toenail Condition: Left toenails are normal.   Lymphadenopathy:      Cervical: No cervical adenopathy. Skin:     General: Skin is warm and dry. Capillary Refill: Capillary refill takes less than 2 seconds.    Neurological:

## 2021-03-02 NOTE — TELEPHONE ENCOUNTER
LAST OFFICE VISIT :03/02/2021 WITH BABAK     Future Appointments   Date Time Provider Pat Matsoni   5/12/2021 11:00 AM Miky Smith MD [facility]   6/18/2021 11:45 AM Jasen Khan, [address] [facility]   6/2/2022  1:15 PM MD Giovanny Savage [facility]

## 2021-03-03 ENCOUNTER — TELEPHONE (OUTPATIENT)
Dept: INTERNAL MEDICINE CLINIC | Age: 61
End: 2021-03-03

## 2021-03-03 NOTE — TELEPHONE ENCOUNTER
Patient returned a missed call from  office. Patient says you may leave the reason of the call, on her answering message.     Please Advise

## 2021-05-12 ENCOUNTER — OFFICE VISIT (OUTPATIENT)
Dept: INTERNAL MEDICINE CLINIC | Age: 61
End: 2021-05-12
Payer: COMMERCIAL

## 2021-05-12 VITALS
DIASTOLIC BLOOD PRESSURE: 84 MMHG | OXYGEN SATURATION: 97 % | HEART RATE: 64 BPM | SYSTOLIC BLOOD PRESSURE: 128 MMHG | BODY MASS INDEX: 43.4 KG/M2 | HEIGHT: 69 IN | WEIGHT: 293 LBS | RESPIRATION RATE: 18 BRPM

## 2021-05-12 DIAGNOSIS — E11.9 TYPE 2 DIABETES MELLITUS WITHOUT COMPLICATION, WITHOUT LONG-TERM CURRENT USE OF INSULIN (HCC): Primary | ICD-10-CM

## 2021-05-12 DIAGNOSIS — E03.4 HYPOTHYROIDISM DUE TO ACQUIRED ATROPHY OF THYROID: ICD-10-CM

## 2021-05-12 DIAGNOSIS — G47.33 OSA (OBSTRUCTIVE SLEEP APNEA): ICD-10-CM

## 2021-05-12 DIAGNOSIS — E11.319 DIABETIC RETINOPATHY OF RIGHT EYE ASSOCIATED WITH TYPE 2 DIABETES MELLITUS, MACULAR EDEMA PRESENCE UNSPECIFIED, UNSPECIFIED RETINOPATHY SEVERITY (HCC): ICD-10-CM

## 2021-05-12 DIAGNOSIS — I10 ESSENTIAL HYPERTENSION: ICD-10-CM

## 2021-05-12 DIAGNOSIS — E78.2 MIXED HYPERLIPIDEMIA: ICD-10-CM

## 2021-05-12 DIAGNOSIS — F32.9 REACTIVE DEPRESSION: ICD-10-CM

## 2021-05-12 LAB — HBA1C MFR BLD: 6.7 %

## 2021-05-12 PROCEDURE — 3051F HG A1C>EQUAL 7.0%<8.0%: CPT | Performed by: INTERNAL MEDICINE

## 2021-05-12 PROCEDURE — 99214 OFFICE O/P EST MOD 30 MIN: CPT | Performed by: INTERNAL MEDICINE

## 2021-05-12 PROCEDURE — 36415 COLL VENOUS BLD VENIPUNCTURE: CPT | Performed by: INTERNAL MEDICINE

## 2021-05-12 PROCEDURE — 83036 HEMOGLOBIN GLYCOSYLATED A1C: CPT | Performed by: INTERNAL MEDICINE

## 2021-05-12 RX ORDER — ACETAMINOPHEN,DIPHENHYDRAMINE HCL 500; 25 MG/1; MG/1
1 TABLET, FILM COATED ORAL NIGHTLY PRN
COMMUNITY

## 2021-05-12 ASSESSMENT — ENCOUNTER SYMPTOMS
BLURRED VISION: 0
CHEST TIGHTNESS: 0
ORTHOPNEA: 0
SHORTNESS OF BREATH: 0
VISUAL CHANGE: 0
COUGH: 0

## 2021-05-12 NOTE — PROGRESS NOTES
and dyslipidemia. Past treatments include ACE inhibitors. The current treatment provides significant improvement. There are no compliance problems. Hypertensive end-organ damage includes retinopathy. Identifiable causes of hypertension include sleep apnea and a thyroid problem. Hyperlipidemia  This is a chronic problem. The current episode started more than 1 year ago. The problem is controlled. Exacerbating diseases include diabetes, hypothyroidism and obesity. There are no known factors aggravating her hyperlipidemia. Pertinent negatives include no chest pain, focal sensory loss, focal weakness, leg pain or shortness of breath. Current antihyperlipidemic treatment includes statins. The current treatment provides significant improvement of lipids. Compliance problems include adherence to diet and psychosocial issues. Risk factors for coronary artery disease include diabetes mellitus, dyslipidemia, hypertension, obesity and post-menopausal.     Hypothyroid :  Chronic problem. Compliant with meds. Minimal constipation. Exercising regularly. No wt gain. Wt steady. No chest pain, shortness of breath or syncope. Diabetic retinopathy:  Followed by optho      Depression:  Chronic problem x yrs. Compliant with meds. Depression under good control with current medications. Wants to continue. Compulsive overeater. effexor helps. Wt stable. Asthma:  Well controlled on advair. Never uses rescue inhaler. Sleep apnea:  Using cpap daily        Review of Systems   Constitutional: Negative for fatigue and fever. Eyes: Negative for blurred vision and visual disturbance. Respiratory: Negative for cough, chest tightness and shortness of breath. Cardiovascular: Negative for chest pain, palpitations, orthopnea, leg swelling and PND. Endocrine: Negative for cold intolerance, heat intolerance, polydipsia, polyphagia and polyuria. Neurological: Negative for focal weakness, syncope and light-headedness. Prior to Visit Medications    Medication Sig Taking? Authorizing Provider   diphenhydrAMINE-APAP, sleep, (TYLENOL PM EXTRA STRENGTH)  MG tablet Take 1 tablet by mouth nightly as needed for Sleep Yes Historical Provider, MD   montelukast (SINGULAIR) 10 MG tablet Take 1 tablet by mouth nightly Yes Tameka Plummer MD   atorvastatin (LIPITOR) 10 MG tablet Take 1 tablet by mouth daily Yes Tameka Plummer MD   clindamycin (CLEOCIN T) 1 % external solution Apply to affected areas on the thighs and buttocks twice daily as needed.  Yes Zachery Sandra MD   metFORMIN (GLUCOPHAGE) 500 MG tablet Take 1 tablet by mouth 2 times daily (with meals) Yes Tameka Plummer MD   venlafaxine [facility]) 75 MG tablet Take 1 tablet by mouth daily Yes Tameka Plummer MD   lisinopril (PRINIVIL;ZESTRIL) 5 MG tablet Take 1 tablet by mouth daily Yes Tameka Plummer MD   Ertugliflozin L-PyroglutamicAc (STEGLATRO) 5 MG TABS Take 5 mg by mouth daily Yes Tameka Plummer MD   glipiZIDE (GLUCOTROL) 5 MG tablet Take 2 tablets by mouth 2 times daily (before meals) Yes Tameka Plummer MD   levothyroxine (SYNTHROID) 125 MCG tablet Take 1 tablet by mouth daily Yes Tameka Plummer MD   sodium chloride (ALTAMIST SPRAY) 0.65 % nasal spray 1 spray by Nasal route as needed for Congestion Yes Hazle Breath, APRN - CNP   Ascorbic Acid (VITAMIN C) 250 MG tablet Take 4 tablets by mouth daily Yes Tameka Plummer MD   fluticasone (FLONASE) 50 MCG/ACT nasal spray 2 sprays by Nasal route nightly Yes Tameka Plummer MD   cetirizine (ZYRTEC) 10 MG tablet Take 1 tablet by mouth daily Yes Tameka Plummer MD   vitamin D (CHOLECALCIFEROL) 1000 units TABS tablet Take 1 tablet by mouth 2 times daily Yes Tameka Plummer MD   Flaxseed, Linseed, (FLAXSEED OIL MAX STR) 1300 MG CAPS Take 1 tablet by mouth daily Yes Tameka Plummer MD   Cinnamon 500 MG CAPS Take 1 capsule by mouth 2 times daily Yes Tameka Plummer MD Multiple Vitamins-Minerals (VISION VITAMINS) TABS Take 1 tablet by mouth daily Yes Tameka Plummer MD   Turmeric Curcumin 500 MG CAPS Take 1 tablet by mouth 2 times daily Yes Tameka Plummer MD   B Complex-Folic Acid (SUPER B COMPLEX MAXI) TABS Take 1 tablet by mouth daily Yes Tameka Plummer MD   Magnesium 400 MG TABS Take 1 tablet by mouth daily Yes Tameka Plummer MD   aspirin 81 MG chewable tablet Take 81 mg by mouth daily Yes Historical Provider, MD   Omega-3 Fatty Acids (FISH OIL CONCENTRATE PO) Take by mouth Yes Historical Provider, MD   Multiple Vitamins-Minerals (THERAPEUTIC MULTIVITAMIN-MINERALS) tablet Take 1 tablet by mouth daily Yes Historical Provider, MD   Calcium Acetate, Phos Binder, (CALCIUM ACETATE PO) Take by mouth daily  Yes Historical Provider, MD       /84 (Site: Left Upper Arm, Position: Sitting, Cuff Size: Large Adult)   Pulse 64   Resp 18   Ht 5' 8.5\" (1.74 m)   Wt (!) 321 lb (145.6 kg)   LMP  (LMP Unknown)   SpO2 97%   BMI 48.10 kg/m²     Objective:   Physical Exam  Vitals signs reviewed. Constitutional:       General: She is not in acute distress. Appearance: Normal appearance. She is well-developed. Eyes:      Pupils: Pupils are equal, round, and reactive to light. Neck:      Vascular: No JVD. Comments: No bruits  Cardiovascular:      Rate and Rhythm: Normal rate and regular rhythm. Heart sounds: Normal heart sounds. No murmur. No friction rub. No gallop. Pulmonary:      Effort: Pulmonary effort is normal. No respiratory distress. Breath sounds: Normal breath sounds. No wheezing, rhonchi or rales. Musculoskeletal:      Right lower leg: No edema. Left lower leg: No edema. Skin:     General: Skin is warm and dry. Findings: No erythema. Neurological:      Mental Status: She is alert and oriented to person, place, and time.    Psychiatric:         Mood and Affect: Mood normal.         Behavior: Behavior normal. Thought Content: Thought content normal.         Judgment: Judgment normal.         Assessment:      1. Type 2 diabetes mellitus without complication, without long-term current use of insulin ([address]    2. Diabetic retinopathy of right eye associated with type 2 diabetes mellitus, macular edema presence unspecified, unspecified retinopathy severity ([address]    3. Essential hypertension    4. Mixed hyperlipidemia    5. Reactive depression    6. Hypothyroidism due to acquired atrophy of thyroid    7. LESLIE (obstructive sleep apnea)            Plan:      Ashlee Carey was seen today for diabetes. Diagnoses and all orders for this visit:    Type 2 diabetes mellitus without complication, without long-term current use of insulin [facility]):  Check labs, adjust med prn  -     Comprehensive Metabolic Panel; Future  -     Lipid Panel; Future  -     POCT glycosylated hemoglobin (Hb A1C)  -     TSH with Reflex; Future    Diabetic retinopathy of right eye associated with type 2 diabetes mellitus, macular edema presence unspecified, unspecified retinopathy severity ([address]):  Check labs, adjust med prn  -     Comprehensive Metabolic Panel; Future  -     Lipid Panel; Future  -     POCT glycosylated hemoglobin (Hb A1C)  -     TSH with Reflex; Future    Essential hypertension:  Stable, cont same meds  -     Comprehensive Metabolic Panel; Future    Mixed hyperlipidemia:  Check labs, adjust med prn  -     Comprehensive Metabolic Panel; Future  -     Lipid Panel; Future    Reactive depression:  Stable, cont same meds    Hypothyroidism due to acquired atrophy of thyroid:  Check labs, adjust med prn  -     TSH with Reflex;  Future    LESLIE (obstructive sleep apnea):  cpap

## 2021-05-13 LAB
A/G RATIO: 1.6 (ref 1.1–2.2)
ALBUMIN SERPL-MCNC: 4.4 G/DL (ref 3.4–5)
ALP BLD-CCNC: 69 U/L (ref 40–129)
ALT SERPL-CCNC: 28 U/L (ref 10–40)
ANION GAP SERPL CALCULATED.3IONS-SCNC: 13 MMOL/L (ref 3–16)
AST SERPL-CCNC: 22 U/L (ref 15–37)
BILIRUB SERPL-MCNC: 0.4 MG/DL (ref 0–1)
BUN BLDV-MCNC: 15 MG/DL (ref 7–20)
CALCIUM SERPL-MCNC: 9.6 MG/DL (ref 8.3–10.6)
CHLORIDE BLD-SCNC: 105 MMOL/L (ref 99–110)
CHOLESTEROL, TOTAL: 141 MG/DL (ref 0–199)
CO2: 24 MMOL/L (ref 21–32)
CREAT SERPL-MCNC: 0.8 MG/DL (ref 0.6–1.2)
GFR AFRICAN AMERICAN: >60
GFR NON-AFRICAN AMERICAN: >60
GLOBULIN: 2.8 G/DL
GLUCOSE BLD-MCNC: 143 MG/DL (ref 70–99)
HDLC SERPL-MCNC: 44 MG/DL (ref 40–60)
LDL CHOLESTEROL CALCULATED: 73 MG/DL
POTASSIUM SERPL-SCNC: 4.9 MMOL/L (ref 3.5–5.1)
SODIUM BLD-SCNC: 142 MMOL/L (ref 136–145)
TOTAL PROTEIN: 7.2 G/DL (ref 6.4–8.2)
TRIGL SERPL-MCNC: 118 MG/DL (ref 0–150)
TSH REFLEX: 0.57 UIU/ML (ref 0.27–4.2)
VLDLC SERPL CALC-MCNC: 24 MG/DL

## 2021-06-28 ENCOUNTER — OFFICE VISIT (OUTPATIENT)
Dept: ENT CLINIC | Age: 61
End: 2021-06-28
Payer: COMMERCIAL

## 2021-06-28 VITALS
HEART RATE: 70 BPM | BODY MASS INDEX: 43.4 KG/M2 | WEIGHT: 293 LBS | DIASTOLIC BLOOD PRESSURE: 73 MMHG | SYSTOLIC BLOOD PRESSURE: 155 MMHG | HEIGHT: 69 IN

## 2021-06-28 DIAGNOSIS — L43.9 LICHEN PLANUS: Primary | ICD-10-CM

## 2021-06-28 PROCEDURE — 99213 OFFICE O/P EST LOW 20 MIN: CPT | Performed by: OTOLARYNGOLOGY

## 2021-06-28 NOTE — PROGRESS NOTES
[address] [facility] SURGERY  Follow up      Patient Name: U[place] [address] Record Number:  <[id number]  Primary Care Physician:  Alexsandra Damon DO  Date of Consultation: 6/28/2021    Chief Complaint: Oral lesions        Interval History  I been following this patient for some oral lichen planus. I biopsied her in March 2020. She has not noticed any changes. REVIEW OF SYSTEMS  As above    PHYSICAL EXAM  GENERAL: No Acute Distress, Alert and Oriented, no Hoarseness, strong voice  EYES: EOMI, Anti-icteric  HENT:   Head: Normocephalic and atraumatic. Face:  Symmetric, facial nerve intact, no sinus tenderness  Right Ear: Normal external ear, normal external auditory canal, intact tympanic membrane with normal mobility and aerated middle ear  Left Ear: Normal external ear, normal external auditory canal, intact tympanic membrane with normal mobility and aerated middle ear  Mouth/Oral Cavity: Patient still has bilateral white discolorations in the buccal region worse on the left. Little bit of pink discoloration left as well. Relatively stable. Oropharynx/Larynx:  normal oropharynx  Nose:Normal external nasal appearance. NECK: Normal range of motion, no thyromegaly, trachea is midline, no lymphadenopathy, no neck masses, no crepitus  CHEST: Normal respiratory effort, no retractions, breathing comfortably  SKIN: No rashes, normal appearing skin, no evidence of skin lesions/tumors  Neuro:  cranial nerve II-XII intact; normal gait  Cardio:  no edema          ASSESSMENT/PLAN  1. Lichen planus  This seems stable. I think we should keep following her for now. The patient does grind her teeth at night and the location of this is right in the bite line. This is probably what is triggering it. She has a disposable mouthguard. Could consider looking into I personalize 1 with a dentist.  I will see her in 6 months.              I have performed a head and neck physical exam personally or was physically present during the key or critical portions of the service. This note was generated completely or in part utilizing Dragon dictation speech recognition software. Occasionally, words are mistranscribed and despite editing, the text may contain inaccuracies due to incorrect word recognition. If further clarification is needed please contact the office at [phone].

## 2021-07-02 RX ORDER — LEVOTHYROXINE SODIUM 0.12 MG/1
125 TABLET ORAL DAILY
Qty: 90 TABLET | Status: CANCELLED | OUTPATIENT
Start: 2021-07-02

## 2021-07-02 NOTE — TELEPHONE ENCOUNTER
Sent to wrong office request synthroid last filled 7/21/20                                           Last ov 6/22/21

## 2021-07-13 ENCOUNTER — OFFICE VISIT (OUTPATIENT)
Dept: SLEEP MEDICINE | Age: 61
End: 2021-07-13
Payer: COMMERCIAL

## 2021-07-13 VITALS
DIASTOLIC BLOOD PRESSURE: 88 MMHG | SYSTOLIC BLOOD PRESSURE: 138 MMHG | OXYGEN SATURATION: 93 % | TEMPERATURE: 96.8 F | RESPIRATION RATE: 18 BRPM | HEIGHT: 69 IN | HEART RATE: 78 BPM | BODY MASS INDEX: 43.4 KG/M2 | WEIGHT: 293 LBS

## 2021-07-13 DIAGNOSIS — I10 ESSENTIAL HYPERTENSION: ICD-10-CM

## 2021-07-13 DIAGNOSIS — G47.33 OBSTRUCTIVE SLEEP APNEA: Primary | ICD-10-CM

## 2021-07-13 DIAGNOSIS — Z86.79 H/O PULMONARY HYPERTENSION: ICD-10-CM

## 2021-07-13 PROCEDURE — 99204 OFFICE O/P NEW MOD 45 MIN: CPT | Performed by: PSYCHIATRY & NEUROLOGY

## 2021-07-13 ASSESSMENT — SLEEP AND FATIGUE QUESTIONNAIRES
HOW LIKELY ARE YOU TO NOD OFF OR FALL ASLEEP WHILE SITTING AND READING: 1
HOW LIKELY ARE YOU TO NOD OFF OR FALL ASLEEP WHILE WATCHING TV: 3
HOW LIKELY ARE YOU TO NOD OFF OR FALL ASLEEP IN A CAR, WHILE STOPPED FOR A FEW MINUTES IN TRAFFIC: 0
ESS TOTAL SCORE: 13
HOW LIKELY ARE YOU TO NOD OFF OR FALL ASLEEP WHILE LYING DOWN TO REST IN THE AFTERNOON WHEN CIRCUMSTANCES PERMIT: 3
NECK CIRCUMFERENCE (INCHES): 19
HOW LIKELY ARE YOU TO NOD OFF OR FALL ASLEEP WHILE SITTING AND TALKING TO SOMEONE: 0
HOW LIKELY ARE YOU TO NOD OFF OR FALL ASLEEP WHILE SITTING QUIETLY AFTER LUNCH WITHOUT ALCOHOL: 2
HOW LIKELY ARE YOU TO NOD OFF OR FALL ASLEEP WHEN YOU ARE A PASSENGER IN A CAR FOR AN HOUR WITHOUT A BREAK: 1
HOW LIKELY ARE YOU TO NOD OFF OR FALL ASLEEP WHILE SITTING INACTIVE IN A PUBLIC PLACE: 3

## 2021-07-13 ASSESSMENT — ENCOUNTER SYMPTOMS
CHOKING: 0
SORE THROAT: 1
GASTROINTESTINAL NEGATIVE: 1
APNEA: 0
EYES NEGATIVE: 1
ALLERGIC/IMMUNOLOGIC NEGATIVE: 1

## 2021-07-13 NOTE — PROGRESS NOTES
Ilda Watson         : 1960        PHONE: [phone] (home)   [] [address]     [] [address]      [] [employer]     [][employer]    [] [employer]  [] [facility]     [] Other:    Diagnosis: [x] LESLIE (G47.33) [] CSA (G47.31) [] Apnea (G47.30)   Length of Need: [] 12 Months [x] 99 Months [] Other:    Machine (JOSETTE!): [] Respironics Dream Station      Auto [] ResMed AirSense     Auto [] Other:     []  CPAP () [] Bilevel ()   Mode: [] Auto [] Spontaneous    Mode: [] Auto [] Spontaneous                            Comfort Settings:   - Ramp Pressure: 5 cmH2O                                        - Ramp time: 15 min                                     -  Flex/EPR - 3 full time                                    - For ResMed Bilevel (TiMax-4 sec   TiMin- 0.2 sec)     Humidifier: [] Heated ()        [x] Water chamber replacement ()/ 1 per 6 months        Mask:   [x] Nasal () /1 per 3 months [] Full Face () /1 per 3 months   [x] Patient choice -Size and fit mask [] Patient Choice - Size and fit mask   [] Dispense:  [] Dispense:    [x] Headgear () / 1 per 3 months [] Headgear () / 1 per 3 months   [x] Replacement Nasal Cushion ()/2 per month [] Interface Replacement ()/1 per month   [x] Replacement Nasal Pillows ()/2 per month         Tubing: [x] Heated ()/1 per 3 months    [] Standard ()/1 per 3 months [] Other:           Filters: [x] Non-disposable ()/1 per 6 months     [x] Ultra-Fine, Disposable ()/2 per month        Miscellaneous: [] Chin Strap ()/ 1 per 6 months [] O2 bleed-in:       LPM   [] Oximetry on CPAP/Bilevel []  Other:          Start Order Date: 21    MEDICAL JUSTIFICATION:  I, the undersigned, certify that the above prescribed supplies are medically necessary for this patients wellbeing.   In my opinion, the supplies are both reasonable and necessary in reference to accepted standards of medicalpractice in treatment of this patients condition.     Lazara Jackman MD      NPI: [id number]       Order Signed Date: 07/13/21    Electronically signed by Lazara Jackman MD on 7/13/2021 at 2:58 PM

## 2021-07-13 NOTE — PATIENT INSTRUCTIONS
We need to download the CPAP data  Patient Education        Learning About CPAP for Sleep Apnea  What is CPAP? CPAP is a small machine that you use at home every night while you sleep. It increases air pressure in your throat to keep your airway open. When you have sleep apnea, this can help you sleep better so you feel much better. CPAP stands for \"continuous positive airway pressure. \"  The CPAP machine will have one of the following:  · A mask that covers your nose and mouth  · Prongs that fit into your nose  · A mask that covers your nose only, which is the most common type. This type is called NCPAP. The N stands for \"nasal.\"  Why is it done? CPAP is usually the best treatment for obstructive sleep apnea. It is the first treatment choice and the most widely used. CPAP:  · Helps you have more normal sleep, so you feel less sleepy and more alert during the daytime. · May help keep heart failure or other heart problems from getting worse. · May help lower your blood pressure. If you use CPAP, your bed partner may also sleep better. That's because you aren't snoring or restless. Your doctor may suggest CPAP if you have:  · Moderate to severe sleep apnea. · Sleep apnea and coronary artery disease (CAD). · Sleep apnea and heart failure. What are the side effects? Some people who use CPAP have:  · A dry or stuffy nose and a sore throat. · Irritated skin on the face. · Sore eyes. · Bloating. How can you care for yourself? If using CPAP is not comfortable, or if you have certain side effects, work with your doctor to fix them. Here are some things you can try:  · Be sure the mask or nasal prongs fit well. · See if your doctor can adjust the pressure of your CPAP. · If your nose is dry, try a humidifier. · If your nose is runny or stuffy, try decongestant medicine or a steroid nasal spray. Be safe with medicines. Read and follow all instructions on the label.  Do not use the medicine longer than the label says. If these things don't help, you might try a different type of machine. Some machines have air pressure that adjusts on its own. Others have air pressures that are different when you breathe in than when you breathe out. This may reduce discomfort caused by too much pressure in your nose. Where can you learn more? Go to [email or web address] org and sign in to your [employer] account. Enter Q260 in the [employer] box to learn more about \"Learning About CPAP for Sleep Apnea. \"     If you do not have an account, please click on the \"Sign Up Now\" link. Current as of: October 26, 2020               Content Version: 12.9  © 2006-2021 Healthwise, Incorporated. Care instructions adapted under license by [facility] ([facility]). If you have questions about a medical condition or this instruction, always ask your healthcare professional. [address] any warranty or liability for your use of this information.

## 2021-07-13 NOTE — PROGRESS NOTES
MD PANTERA Chapa Board Certified in Sleep Medicine  Certified [facility] Sleep [facility]  Board Certified in Neurology [address]  [address]  [address] [place] [address] [facility] [address], [address]           [address]  [address] [facility] [address] [phone]    Subjective:     Patient ID: Winter Kan is a 59 y.o. female. Chief Complaint   Patient presents with    Sleep Apnea       HPI:        Winter Kan is a 59 y.o. female referred by Dr. Jhoana Reddy for a sleep evaluation. Patient  was diagnosed with moderate obstructive sleep apnea about 10 years ago, currently of PAP machine at 13 CMWP, last sleep study was 10 years ago, last PAP titration about 10 years ago. Patient is using the PAP machine  in total average of 7-10 hours a night, more than 4 hours a night about 100 % according to the patient. This patient has gained about 20 pounds since last PAP titration    SLEEP SCHEDULE: Goes to bed around 2 AM in the weekdays and 2 AM in the weekends. It usually takes the patient 20 minutes to fall asleep. The patient gets up 0 per night to go to the bathroom. The Patient finally gets up at 10-11 AM during the weekdays and 10-11 AM in the weekends. patient wakes up with dry mouth . The patient has restless sleep with frequent arousals in addition to the Patient has significant daytime sleepiness. The Patient scored Total score: 13 on Badger Sleepiness Scale ( more than 10 is indicative of daytime sleepiness)and 43 in fatigue scale ( more than 36 is indicative of daytime fatigue). The patient takes 1-3 naps/week for 30 minutes and usually is not refreshing nap. Previous evaluation and treatment has included- split night PSG. Had study done on 07/20/2012 which showed an AHI - 26.4/hr with Low SaO2 - 85% . This is consistent with severe LESLIE (327.23)      DOT/CDL - N/A  FAA/'slicense - N/A  The patient HTN and pulmonary HTN are stable. Previous Report(s) Reviewed: historical medical records       Social History     Socioeconomic History    Marital status: Single     Spouse name: Not on file    Number of children: Not on file    Years of education: Not on file    Highest education level: Not on file   Occupational History    Occupation: not working right now     Comment: looking for a part time job   Tobacco Use    Smoking status: Never Smoker    Smokeless tobacco: Never Used   Vaping Use    Vaping Use: Never used   Substance and Sexual Activity    Alcohol use: No    Drug use: No    Sexual activity: Not Currently   Other Topics Concern    Not on file   Social History Narrative    Not on file     Social Determinants of Health     Financial Resource Strain: Low Risk     Difficulty of Paying Living Expenses: Not hard at all   Food Insecurity: No Food Insecurity    Worried About [employer] in the Last Year: Never true    [facility] in the Last Year: Never true   Transportation Needs:     Lack of Transportation (Medical):  Lack of Transportation (Non-Medical):    Physical Activity:     Days of Exercise per Week:     Minutes of Exercise per Session:    Stress:     Feeling of Stress :    Social Connections:     Frequency of Communication with Friends and Family:     Frequency of Social Gatherings with Friends and Family:     Attends Anabaptism Services:     Active Member of Clubs or Organizations:     Attends Club or Organization Meetings:     Marital Status:    Intimate Partner Violence:     Fear of Current or Ex-Partner:     Emotionally Abused:     Physically Abused:     Sexually Abused:        Prior to Admission medications    Medication Sig Start Date End Date Taking?  Authorizing Provider   levothyroxine (SYNTHROID) 125 MCG tablet Take 1 tablet by mouth daily 7/6/21  Yes Lula Avalos,    diphenhydrAMINE-APAP, sleep, (TYLENOL PM EXTRA STRENGTH)  MG tablet Take 1 tablet by mouth nightly as needed for Sleep   Yes Historical Provider, MD   montelukast (SINGULAIR) 10 MG tablet Take 1 tablet by mouth nightly 3/2/21  Yes Bert Escalante MD   atorvastatin (LIPITOR) 10 MG tablet Take 1 tablet by mouth daily 3/2/21  Yes Bert Escalante MD   clindamycin (CLEOCIN T) 1 % external solution Apply to affected areas on the thighs and buttocks twice daily as needed.  2/1/21  Yes Patt Anders MD   metFORMIN (GLUCOPHAGE) 500 MG tablet Take 1 tablet by mouth 2 times daily (with meals) 1/25/21  Yes Bert Escalante MD   [facility] 75 MG tablet Take 1 tablet by mouth daily 1/11/21  Yes Bert Escalante MD   lisinopril (PRINIVIL;ZESTRIL) 5 MG tablet Take 1 tablet by mouth daily 1/4/21  Yes Bert Escalante MD   glipiZIDE (GLUCOTROL) 5 MG tablet Take 2 tablets by mouth 2 times daily (before meals) 7/27/20  Yes Bert Escalante MD   sodium chloride (ALTAMIST SPRAY) 0.65 % nasal spray 1 spray by Nasal route as needed for Congestion 4/6/18  Yes RAUL Chong CNP   Ascorbic Acid (VITAMIN C) 250 MG tablet Take 4 tablets by mouth daily 3/23/18  Yes Bert Escalante MD   fluticasone [facility]) 50 MCG/ACT nasal spray 2 sprays by Nasal route nightly 3/23/18  Yes Bert Escalante MD   cetirizine (ZYRTEC) 10 MG tablet Take 1 tablet by mouth daily 3/23/18  Yes Bert Escalante MD   vitamin D (CHOLECALCIFEROL) 1000 units TABS tablet Take 1 tablet by mouth 2 times daily 3/23/18  Yes Bert Escalante MD   Flaxseed, Linseed, (FLAXSEED OIL MAX STR) 1300 MG CAPS Take 1 tablet by mouth daily 3/23/18  Yes Bert Escalante MD   Cinnamon 500 MG CAPS Take 1 capsule by mouth 2 times daily 3/23/18  Yes Bert Escalante MD   Multiple Vitamins-Minerals (VISION VITAMINS) TABS Take 1 tablet by mouth daily 3/23/18  Yes Bert Escalante MD   Turmeric Curcumin 500 MG CAPS Take 1 tablet by mouth 2 times daily 3/23/18  Yes Leanne Crawford MD   B Complex-Folic Acid (SUPER B COMPLEX MAXI) TABS Take 1 tablet by mouth daily 3/23/18  Yes Leanne Crawford MD   Magnesium 400 MG TABS Take 1 tablet by mouth daily 3/23/18  Yes Leanne Crawford MD   aspirin 81 MG chewable tablet Take 81 mg by mouth daily   Yes Historical Provider, MD   Omega-3 Fatty Acids ([address]) Take by mouth   Yes Historical Provider, MD   Multiple Vitamins-Minerals (THERAPEUTIC MULTIVITAMIN-MINERALS) tablet Take 1 tablet by mouth daily   Yes Historical Provider, MD   Calcium Acetate, Phos Binder, (CALCIUM ACETATE PO) Take by mouth daily    Yes Historical Provider, MD   Ertugliflozin L-PyroglutamicAc (STEGLATRO) 5 MG TABS Take 5 mg by mouth daily 9/3/20   Leanne Crawford MD       Allergies as of 07/13/2021 - Fully Reviewed 07/13/2021   Allergen Reaction Noted    Bactrim [sulfamethoxazole-trimethoprim] Nausea Only and Other (See Comments) 02/01/2021    Penicillins Rash 09/07/2017       Patient Active Problem List   Diagnosis    Type 2 diabetes mellitus without complication, without long-term current use of insulin ([address]    Mixed hyperlipidemia    Essential hypertension    Obstructive sleep apnea syndrome    GERD (gastroesophageal reflux disease)    Reactive depression    Compulsive overeater    DDD (degenerative disc disease), lumbar    Vitamin D deficiency    Pulmonary HTN ([address]    Obesity    Diabetic retinopathy of right eye associated with type 2 diabetes mellitus ([address]    Hypothyroidism due to acquired atrophy of thyroid       Past Medical History:   Diagnosis Date    Allergic rhinitis     Asthma     Compulsive overeater     DDD (degenerative disc disease), lumbar     Depression     Diabetes mellitus ([address]     Diabetic retinopathy ([address]     GERD (gastroesophageal reflux disease)     Hyperlipidemia     Hypertension     Hypothyroidism     Lichen planus     Obesity     Pulmonary HTN (HCC)     Sleep apnea     cpap    Thyroid nodule     Type 2 diabetes mellitus without complication (HCC)     Vitamin D deficiency        Past Surgical History:   Procedure Laterality Date    JOINT REPLACEMENT Bilateral     tkr    LASIK      REVISION TOTAL KNEE ARTHROPLASTY Right     TOTAL KNEE ARTHROPLASTY Bilateral     WISDOM TOOTH EXTRACTION         Family History   Problem Relation Age of Onset    Other Mother         emphysema, subdural hematoma?  Heart Disease Father     Diabetes Father     Diabetes Maternal Grandmother     Heart Disease Maternal Grandmother     Thyroid Disease Maternal Grandmother        Review of Systems   Constitutional: Positive for fatigue. HENT: Positive for congestion and sore throat. Eyes: Negative. Respiratory: Negative for apnea and choking. Cardiovascular: Positive for leg swelling. Gastrointestinal: Negative. Endocrine: Positive for heat intolerance. Genitourinary: Negative for frequency. Musculoskeletal: Positive for arthralgias. Skin: Negative. Allergic/Immunologic: Negative. Neurological: Positive for headaches. Hematological: Bruises/bleeds easily. Objective:     Vitals:  Weight BMI Neck circumference    Wt Readings from Last 3 Encounters:   07/13/21 (!) 319 lb 12.8 oz (145.1 kg)   06/28/21 (!) 319 lb (144.7 kg)   06/22/21 (!) 319 lb 3.2 oz (144.8 kg)    Body mass index is 47.23 kg/m². Neck circumference: 19     BP HR SaO2   BP Readings from Last 3 Encounters:   07/13/21 138/88   06/28/21 (!) 155/73   06/22/21 (!) 142/92    Pulse Readings from Last 3 Encounters:   07/13/21 78   06/28/21 70   06/22/21 72    SpO2 Readings from Last 3 Encounters:   07/13/21 93%   06/22/21 98%   05/12/21 97%        The mandibular molar Class :   []1 []2 []3      Mallampati I Airway Classification:   []1 []2 []3 []4        Physical Exam  Vitals and nursing note reviewed. Constitutional:       Appearance: She is obese.    HENT: Head: Atraumatic. Nose: Nose normal.      Mouth/Throat:      Comments: Mallampati class 3, no retrognathia or hypognathia , normal airflow in bilateral nostrils, no septum deviation , crowded oropharynx with low soft palate, high arched hard palate,no tonsils enlargement. Eyes:      Extraocular Movements: Extraocular movements intact. Cardiovascular:      Rate and Rhythm: Normal rate and regular rhythm. Heart sounds: Normal heart sounds. Pulmonary:      Effort: Pulmonary effort is normal.      Breath sounds: Normal breath sounds. Musculoskeletal:         General: Normal range of motion. Cervical back: Normal range of motion and neck supple. Skin:     General: Skin is warm. Neurological:      General: No focal deficit present. Psychiatric:         Mood and Affect: Mood normal.         Assessment:   Moderate  Obstructive sleep apnea , uses CPAP at 13 cm (airsence 10)             Diagnosis Orders   1. Obstructive sleep apnea     2. H/O pulmonary hypertension     3. Essential hypertension       Plan:   Download the CPAP data (Update: uses the CPAP 100% more than 4 hours a night in average of 8:25 hours in the last 90 day, the AHI is 0.2/hr)  Continue the CPAP at 13 cm. Patient was counseled about the pathophysiology of obstructive sleep apnea syndrome and the methods for evaluating its presence and severity. Patient was counseled to avoid driving and other potentially hazardous circumstances if the patient is experiencing excessive sleepiness.   Treatment considerations include the use of nasal CPAP, oral dental appliance or a surgical intervention, which should be based on otolarygologic findings, In the meantime, the patient should be cautioned to avoid the use of alcohol or other depressant medications because of potential for increasing the duration and severity of apnea and cautioned regarding driving or operating and dangerous equipment if the patient is experiencing daytime sleepiness. .  Most likely treating the LESLIE will have position impact on HTN and pulmonary HTNcontrol. We discussed the proportionality between weight and AHI. With 10% weight change, the AHI has a 27% proportionate change. With 20% weight change, the AHI has a 45-50% proportionate change. No orders of the defined types were placed in this encounter. Return in about 1 year (around 7/13/2022) for Reveiwing CPAP usage and compliance report and tro.     Martha Tavera MD  Medical Director [address] [place] [address]

## 2021-12-29 ENCOUNTER — OFFICE VISIT (OUTPATIENT)
Dept: ENT CLINIC | Age: 61
End: 2021-12-29
Payer: COMMERCIAL

## 2021-12-29 VITALS
BODY MASS INDEX: 46.81 KG/M2 | HEART RATE: 67 BPM | SYSTOLIC BLOOD PRESSURE: 149 MMHG | DIASTOLIC BLOOD PRESSURE: 71 MMHG | WEIGHT: 293 LBS

## 2021-12-29 DIAGNOSIS — L43.9 LICHEN PLANUS: Primary | ICD-10-CM

## 2021-12-29 PROCEDURE — 99213 OFFICE O/P EST LOW 20 MIN: CPT | Performed by: OTOLARYNGOLOGY

## 2021-12-29 NOTE — PROGRESS NOTES
[address] [facility] NECK SURGERY  Follow up      Patient Name: U[place] [address] Record Number:  <[id number]  Primary Care Physician:  Malinda Mercer DO  Date of Consultation: 12/29/2021    Chief Complaint: Oral lesions      Interval History    I was seeing the patient for bilateral buccal mucosal lesions. I had biopsied it and it was consistent with lichen planus. She does not think there is been any changes. She is still grinding her teeth, but does use a bite block. REVIEW OF SYSTEMS    As above  PHYSICAL EXAM  GENERAL: No Acute Distress, Alert and Oriented, no Hoarseness, strong voice  EYES: EOMI, Anti-icteric  HENT:   Head: Normocephalic and atraumatic. Face:  Symmetric, facial nerve intact, no sinus tenderness  Right Ear: Normal external ear, normal external auditory canal, intact tympanic membrane with normal mobility and aerated middle ear  Left Ear: Normal external ear, normal external auditory canal, intact tympanic membrane with normal mobility and aerated middle ear  Mouth/Oral Cavity: Patient appears to have stable white discoloration is worse on the left than right in the posterior buccal mucosa. Oropharynx/Larynx:  normal oropharynx  Nose:Normal external nasal appearance. NECK: Normal range of motion, no thyromegaly, trachea is midline, no lymphadenopathy, no neck masses, no crepitus      ASSESSMENT/PLAN  1. Lichen planus  I feel as though this is stable. We can continue every 6-month follow-up. If she has any concerning changes prior to that time, I would like for her to follow-up immediately             I have performed a head and neck physical exam personally or was physically present during the key or critical portions of the service. This note was generated completely or in part utilizing Dragon dictation speech recognition software.   Occasionally, words are mistranscribed and despite editing, the text may contain inaccuracies due to incorrect word recognition. If further clarification is needed please contact the office at [phone].

## 2022-06-02 ENCOUNTER — OFFICE VISIT (OUTPATIENT)
Dept: DERMATOLOGY | Age: 62
End: 2022-06-02
Payer: COMMERCIAL

## 2022-06-02 DIAGNOSIS — L73.2 HIDRADENITIS SUPPURATIVA: ICD-10-CM

## 2022-06-02 DIAGNOSIS — D18.01 CHERRY ANGIOMA: ICD-10-CM

## 2022-06-02 DIAGNOSIS — L21.9 SEBORRHEIC DERMATITIS: Primary | ICD-10-CM

## 2022-06-02 PROCEDURE — 99213 OFFICE O/P EST LOW 20 MIN: CPT | Performed by: DERMATOLOGY

## 2022-06-02 RX ORDER — CLOBETASOL PROPIONATE 0.46 MG/ML
SOLUTION TOPICAL
Qty: 50 ML | Refills: 2 | Status: SHIPPED
Start: 2022-06-02 | End: 2022-06-03

## 2022-06-02 NOTE — PROGRESS NOTES
[facility]  Adelaide Jimenez MD  [address] [place] [address] Blvd  1960    57 y.o. female     Date of Visit: 6/2/2022    Chief Complaint: rash    History of Present Illness:    1. She presents today for a chronic pruritic eruption on the lower portions of the scalp. 2.  She also reports gradually accumulating red lesions on the trunk and extremities. 3.  She has a history of Rollins stage 1 hidradenitis of the proximal inner thighs and left lower buttock. Has been quiescent. Had only 1 flare since last visit - improved with clindamycin solution. She has a history of oral lichen planus. Review of Systems:  Gen: Feels well, good sense of health. Past Medical History, Family History, Surgical History, Medications and Allergies reviewed.     Past Medical History:   Diagnosis Date    Allergic rhinitis     Asthma     Compulsive overeater     DDD (degenerative disc disease), lumbar     Depression     Diabetes mellitus (HCC)     Diabetic retinopathy ([address]     GERD (gastroesophageal reflux disease)     Hyperlipidemia     Hypertension     Hypothyroidism     Lichen planus     Obesity     Pulmonary HTN (HCC)     Sleep apnea     cpap    Thyroid nodule     Type 2 diabetes mellitus without complication ([address]     Vitamin D deficiency      Past Surgical History:   Procedure Laterality Date    COLONOSCOPY  08/2015    [facility]- polyp x 2, left sided diverticular disease, repeat 5 years    LASIK      REVISION TOTAL KNEE ARTHROPLASTY Right     TOTAL KNEE ARTHROPLASTY Bilateral     WISDOM TOOTH EXTRACTION         Allergies   Allergen Reactions    Bactrim [Sulfamethoxazole-Trimethoprim] Nausea Only and Other (See Comments)     Nausea, headache    Penicillins Rash     Outpatient Medications Marked as Taking for the 6/2/22 encounter (Office Visit) with Omari Waters MD   Medication Sig Dispense Refill    clobetasol (TEMOVATE) 0.05 % external solution Apply a small amount daily to the affected areas of the scalp daily until improved. 50 mL 2    venlafaxine (EFFEXOR) 75 MG tablet Take 1 tablet by mouth daily 90 tablet 3    atorvastatin (LIPITOR) 10 MG tablet Take 1 tablet by mouth daily 90 tablet 3    montelukast (SINGULAIR) 10 MG tablet Take 1 tablet by mouth nightly 90 tablet 3    losartan (COZAAR) 25 MG tablet Take 0.5 tablets by mouth daily 45 tablet 5    Ertugliflozin L-PyroglutamicAc (STEGLATRO) 5 MG TABS Take 5 mg by mouth daily 30 tablet 12    metFORMIN (GLUCOPHAGE) 500 MG tablet Take 1 tablet by mouth 2 times daily (with meals) 180 tablet 3    glipiZIDE (GLUCOTROL) 5 MG tablet Take 2 tablets by mouth 2 times daily (before meals) 360 tablet 3    levothyroxine (SYNTHROID) 125 MCG tablet Take 1 tablet by mouth daily 90 tablet 3    diphenhydrAMINE-APAP, sleep, (TYLENOL PM EXTRA STRENGTH)  MG tablet Take 1 tablet by mouth nightly as needed for Sleep      clindamycin (CLEOCIN T) 1 % external solution Apply to affected areas on the thighs and buttocks twice daily as needed.  60 mL 2    sodium chloride (ALTAMIST SPRAY) 0.65 % nasal spray 1 spray by Nasal route as needed for Congestion 1 Bottle 3    Ascorbic Acid (VITAMIN C) 250 MG tablet Take 4 tablets by mouth daily 30 tablet 0    fluticasone (FLONASE) 50 MCG/ACT nasal spray 2 sprays by Nasal route nightly 1 Bottle 0    cetirizine (ZYRTEC) 10 MG tablet Take 1 tablet by mouth daily 30 tablet 0    vitamin D (CHOLECALCIFEROL) 1000 units TABS tablet Take 1 tablet by mouth 2 times daily 30 tablet     Flaxseed, Linseed, (FLAXSEED OIL MAX STR) 1300 MG CAPS Take 1 tablet by mouth daily 30 capsule 0    Cinnamon 500 MG CAPS Take 1 capsule by mouth 2 times daily 30 capsule 0    Multiple Vitamins-Minerals (VISION VITAMINS) TABS Take 1 tablet by mouth daily 30 tablet 0    Turmeric Curcumin 500 MG CAPS Take 1 tablet by mouth 2 times daily 30 capsule 0    B Complex-Folic Acid (SUPER B COMPLEX MAXI) TABS Take 1 tablet by mouth daily 30 tablet 0    Magnesium 400 MG TABS Take 1 tablet by mouth daily 30 tablet 0    aspirin 81 MG chewable tablet Take 81 mg by mouth daily      Omega-3 Fatty Acids (FISH OIL CONCENTRATE PO) Take by mouth      Multiple Vitamins-Minerals (THERAPEUTIC MULTIVITAMIN-MINERALS) tablet Take 1 tablet by mouth daily      Calcium Acetate, Phos Binder, (CALCIUM ACETATE PO) Take by mouth daily            Physical Examination       The following were examined and determined to be normal: Psych/Neuro, Head/face, Conjunctivae/eyelids, Gums/teeth/lips, Neck, Breast/axilla/chest, Abdomen, Back, RUE, LUE, RLE, LLE and Nails/digits. The following were examined and determined to be abnormal: Scalp/hair. Well appearing. 1.  Right lower temporal scalp extending to the lower occipital scalp, focally on the right frontotemporal scalp, and left lower temporal scalp are well-defined minimally scaly pink patches and plaques. 2.  Trunk and upper extremities with scattered round smooth brightly erythematous macules and papules. 3.  Clear. Assessment and Plan     1. Seborrheic dermatitis of the scalp - limited in extent    Clobetasol solution daily until improved and then as needed for recurrences. 2. Cherry angiomas    Reassurance. 3. Hidradenitis suppurativa, Rollins stage 1 - quiescent    Clindamycin solution twice daily as needed for recurrences. Return in about 1 year (around 6/2/2023).     --Rosita Brown MD

## 2022-06-03 ENCOUNTER — PATIENT MESSAGE (OUTPATIENT)
Dept: DERMATOLOGY | Age: 62
End: 2022-06-03

## 2022-06-03 RX ORDER — CLOBETASOL PROPIONATE 0.46 MG/ML
SOLUTION TOPICAL
Qty: 50 ML | Refills: 2 | Status: SHIPPED | OUTPATIENT
Start: 2022-06-03

## 2022-06-03 NOTE — TELEPHONE ENCOUNTER
Called and spoke to patient and advised her that I received a fax from [facility] stating that [place] was denied for Clobetasol. Advised her that we if sent to a different pharmacy she could use a Goodrx coupon. Patient would like Clobetasol solution sent to [facility] Cost should be $19.95 with coupon.

## 2022-06-03 NOTE — TELEPHONE ENCOUNTER
From: Monserrat Raygoza  To: Dr. Mary Whitney  Sent: 6/3/2022 11:51 AM EDT  Subject: clobetasol    Hi Dr. CAMEJO [facility],    My pharmacy has informed me that [facility] is requiring a prior authorization for the Rx of clobetasol. I have completed one to the best of my ability, but they may contact your office regarding this as well. Please let me know if you need additional information from me.     Kindest regards,  Francisco Payne

## 2022-06-21 ENCOUNTER — HOSPITAL ENCOUNTER (OUTPATIENT)
Dept: WOMENS IMAGING | Age: 62
Discharge: HOME | End: 2022-06-21
Payer: COMMERCIAL

## 2022-06-21 ENCOUNTER — OFFICE VISIT (OUTPATIENT)
Dept: ORTHOPEDIC SURGERY | Age: 62
End: 2022-06-21
Payer: COMMERCIAL

## 2022-06-21 VITALS — RESPIRATION RATE: 16 BRPM | HEIGHT: 69 IN | WEIGHT: 293 LBS | BODY MASS INDEX: 43.4 KG/M2

## 2022-06-21 DIAGNOSIS — M70.71 ISCHIAL BURSITIS OF RIGHT SIDE: ICD-10-CM

## 2022-06-21 DIAGNOSIS — Z12.31 BREAST CANCER SCREENING BY MAMMOGRAM: ICD-10-CM

## 2022-06-21 DIAGNOSIS — R52 PAIN: Primary | ICD-10-CM

## 2022-06-21 PROCEDURE — 77063 BREAST TOMOSYNTHESIS BI: CPT

## 2022-06-21 PROCEDURE — 99203 OFFICE O/P NEW LOW 30 MIN: CPT | Performed by: PHYSICIAN ASSISTANT

## 2022-06-22 NOTE — PROGRESS NOTES
Subjective:      Patient ID: Lobo Mcelroy is a 57 y.o. female who is here for an initial evaluation of right posterior hip pain. Symptoms have been present for approximately 1 year. No history of injury. Pain is mainly with sitting especially on hard objects. Majority of the time her pain is posterior but occasionally does have some anterior groin pain. She denies lumbar radicular pain down the lower extremity, numbness or tingling. She denies any perceived weakness. She denies saddle anesthesia or bladder or bowel changes. Pain Scale 7/10 VAS. Location of pain. Points to the region of her proximal hamstring/ischium. Previous treatments have included stretching while doing water aerobics. Review of Systems:  I have reviewed the clinically relevant past medical history, medications, allergies, family history, social history, and 13 point Review of Systems from the patient's recent history form & documented any details relevant to today's presenting complaints in the history above. The patient's self-reported past medical history, medications, allergies, family history, social history, and Review of Systems form from today's date have been scanned into the chart under the \"Media\" tab. As outlined in the HPI. Negative for fever or chills. Positive for poly-joint pain, swelling and stiffness.        Past Medical History:   Diagnosis Date    Allergic rhinitis     Asthma     Compulsive overeater     DDD (degenerative disc disease), lumbar     Depression     Diabetes mellitus ([address]     Diabetic retinopathy ([address]     GERD (gastroesophageal reflux disease)     Hyperlipidemia     Hypertension     Hypothyroidism     Lichen planus     Obesity     Pulmonary HTN (HCC)     Sleep apnea     cpap    Thyroid nodule     Type 2 diabetes mellitus without complication (HCC)     Vitamin D deficiency        Family History   Problem Relation Age of Onset    Other Mother         emphysema, cerebral aneurysm vs. subdural hematoma?  Heart Disease Father     Diabetes Father     Diabetes Maternal Grandmother     Heart Disease Maternal Grandmother     Thyroid Disease Maternal Grandmother     Cancer Sister         Bladder vs Lung       Past Surgical History:   Procedure Laterality Date    COLONOSCOPY  08/2015    [facility]- polyp x 2, left sided diverticular disease, repeat 5 years    LASIK      REVISION TOTAL KNEE ARTHROPLASTY Right     TOTAL KNEE ARTHROPLASTY Bilateral     WISDOM TOOTH EXTRACTION         Social History     Occupational History    Occupation: not working right now     Comment: looking for a part time job   Tobacco Use    Smoking status: Never Smoker    Smokeless tobacco: Never Used   Vaping Use    Vaping Use: Never used   Substance and Sexual Activity    Alcohol use: No    Drug use: No    Sexual activity: Not Currently       Current Outpatient Medications   Medication Sig Dispense Refill    clobetasol (TEMOVATE) 0.05 % external solution Apply a small amount daily to the affected areas of the scalp daily until improved.  50 mL 2    venlafaxine (EFFEXOR) 75 MG tablet Take 1 tablet by mouth daily 90 tablet 3    atorvastatin (LIPITOR) 10 MG tablet Take 1 tablet by mouth daily 90 tablet 3    montelukast (SINGULAIR) 10 MG tablet Take 1 tablet by mouth nightly 90 tablet 3    losartan (COZAAR) 25 MG tablet Take 0.5 tablets by mouth daily 45 tablet 5    Ertugliflozin L-PyroglutamicAc (STEGLATRO) 5 MG TABS Take 5 mg by mouth daily 30 tablet 12    metFORMIN (GLUCOPHAGE) 500 MG tablet Take 1 tablet by mouth 2 times daily (with meals) 180 tablet 3    glipiZIDE (GLUCOTROL) 5 MG tablet Take 2 tablets by mouth 2 times daily (before meals) 360 tablet 3    levothyroxine (SYNTHROID) 125 MCG tablet Take 1 tablet by mouth daily 90 tablet 3    diphenhydrAMINE-APAP, sleep, (TYLENOL PM EXTRA STRENGTH)  MG tablet Take 1 tablet by mouth nightly as needed for Sleep      clindamycin (CLEOCIN T) 1 % external solution Apply to affected areas on the thighs and buttocks twice daily as needed. 60 mL 2    sodium chloride (ALTAMIST SPRAY) 0.65 % nasal spray 1 spray by Nasal route as needed for Congestion 1 Bottle 3    Ascorbic Acid (VITAMIN C) 250 MG tablet Take 4 tablets by mouth daily 30 tablet 0    fluticasone (FLONASE) 50 MCG/ACT nasal spray 2 sprays by Nasal route nightly 1 Bottle 0    cetirizine (ZYRTEC) 10 MG tablet Take 1 tablet by mouth daily 30 tablet 0    vitamin D (CHOLECALCIFEROL) 1000 units TABS tablet Take 1 tablet by mouth 2 times daily 30 tablet     Flaxseed, Linseed, (FLAXSEED OIL MAX STR) 1300 MG CAPS Take 1 tablet by mouth daily 30 capsule 0    Cinnamon 500 MG CAPS Take 1 capsule by mouth 2 times daily 30 capsule 0    Multiple Vitamins-Minerals (VISION VITAMINS) TABS Take 1 tablet by mouth daily 30 tablet 0    Turmeric Curcumin 500 MG CAPS Take 1 tablet by mouth 2 times daily 30 capsule 0    B Complex-Folic Acid (SUPER B COMPLEX MAXI) TABS Take 1 tablet by mouth daily 30 tablet 0    Magnesium 400 MG TABS Take 1 tablet by mouth daily 30 tablet 0    aspirin 81 MG chewable tablet Take 81 mg by mouth daily      Omega-3 Fatty Acids (FISH OIL CONCENTRATE PO) Take by mouth      Multiple Vitamins-Minerals (THERAPEUTIC MULTIVITAMIN-MINERALS) tablet Take 1 tablet by mouth daily      Calcium Acetate, Phos Binder, (CALCIUM ACETATE PO) Take by mouth daily        No current facility-administered medications for this visit. Allergies   Allergen Reactions    Bactrim [Sulfamethoxazole-Trimethoprim] Nausea Only and Other (See Comments)     Nausea, headache    Penicillins Rash         Objective:     She is alert, oriented x 3, pleasant, well nourished, developed and in no acute distress. Resp 16   Ht 5' 9\" (1.753 m)   Wt (!) 323 lb (146.5 kg)   LMP  (LMP Unknown)   BMI 47.70 kg/m²        Examination of the right hip shows:  No discoloration, wounds or gross deformity.   Greater trochanter/bursa palpation is minimally tender. Anterior superior iliac spine is nontender. Ischial tuberosity is markedly tender. No palpable defect within the proximal hamstring. Sacroiliac joint is mildly tender. ROM:  -Flexion 130                      (Nml 120-135 degrees)  -Extension 20                  (Nml 20-30 degrees)  -Abduction 40                  (Nml 40-50 degrees)  -Internal rotation 30         (Nml 30 degrees)  -External rotation 45        (Nml 50 degrees)    Stinchfield resisted hip flexion test negative. Gait ( Nml, Antalgic, Trendelenberg)-normal.    Lower extremities:  She has 5/5 motor strength of bilateral lower extremities. She has a negative straight leg raise, bilaterally. Deep tendon reflexes at knees and achilles are 2+. Sensation is intact to light touch L3 to S1 bilaterally. She has no clonus. Examination of the lower extremities shows intact perfusion to both lower extremities. She has no cyanosis and digigts are warm to touch, capillary refill is less than 2 seconds. She has mild edema noted. She has intact skin without lacerations or abrasions, no significant erythema, rashes or skin lesions. X Rays: were performed in the office today:   AP Pelvis, AP and Frog Leg Lateral  Right Hip:    The alignment is anatomic. There are radiographic findings to suggest mild degenerative changes of the bilateral hips. There are no radiographic findings to suggest fracture or dislocation. Additional Tests reviewed: none  Additional Outside Records reviewed: none    Diagnosis:       ICD-10-CM    1. Pain  R52 XR HIP 2-3 VW W PELVIS RIGHT   2. Ischial bursitis of right side  M70.71 Ambulatory referral to Physical Therapy        Assessment and Plan:       Assessment:  She does have mild degenerative arthritis of the left and right hip.   Negative Stinchfield test.  I believe most of her pain is related to ischial tuberosity bursitis or possible partial tearing of the hamstring. I had an extensive discussion with Ms. Jonathan Biggs regarding the natural history, etiology, and long term consequences of her condition. I have presented reasonable alternatives to the patient's proposed care, treatment, and services. Risks and benefits of the treatment options also reviewed in detail. I have outlined a treatment plan with them. She has had full opportunity to ask her questions. I have answered them all to her satisfaction. I feel that Ms. Jonathan Biggs understands our discussion today       Plan:  Medications-   OTC NSAIDS discussed. 1. The most common side effects from NSAIDs are stomachaches, heartburn, and nausea. NSAIDs may irritate the stomach lining. If the medicine upsets your stomach, you can try taking it with food. But if that doesn't help, talk with your doctor to make sure it's not a more serious problem, such as a stomach ulcer or bleeding in the stomach or intestines. 2. Using NSAIDs may:  ? Lead to high blood pressure. ? Make symptoms of heart failure worse. ? Raise the risk of heart attack, stroke, kidney damage, and skin reactions. 3. Your risks are greater if you take NSAIDs at higher doses or for longer than the label says. People who are older than 72 or who have heart, stomach, or intestinal disease have a higher risk for problems. PT-referred to physical therapy for exercises, stretching and modalities. Further Imaging-MRI of the right calf if symptoms fail to respond to conservative treatment over the next 4 weeks. Follow up- 4 weeks. Call or return to clinic if these symptoms worsen or fail to improve as anticipated. The total time spent on today's visit including reviewing test results, history, performance of physical exam, counseling/ education, ordering of medications, tests or procedures was 33 minutes. This time does not include completion of the medical record.   This time excludes any time spent performing procedures or tests in the office. Lyle Conde PA-C   Senior Physician Assistant   [facility]                                         Disclaimer: This note was generated with use of a verbal recognition program (DRAGON) and an attempt was made to check for errors. It is possible that there are still dictated errors within this office note. If so, please bring any significant errors to my attention for an addendum. All efforts were made to ensure that this office note is accurate.

## 2022-06-29 ENCOUNTER — OFFICE VISIT (OUTPATIENT)
Dept: ENT CLINIC | Age: 62
End: 2022-06-29
Payer: COMMERCIAL

## 2022-06-29 VITALS
SYSTOLIC BLOOD PRESSURE: 144 MMHG | BODY MASS INDEX: 43.4 KG/M2 | WEIGHT: 293 LBS | HEIGHT: 69 IN | DIASTOLIC BLOOD PRESSURE: 79 MMHG

## 2022-06-29 DIAGNOSIS — L43.9 LICHEN PLANUS: Primary | ICD-10-CM

## 2022-06-29 PROCEDURE — 99213 OFFICE O/P EST LOW 20 MIN: CPT | Performed by: OTOLARYNGOLOGY

## 2022-06-29 NOTE — PROGRESS NOTES
[facility]- HEAD & NECK SURGERY  Follow up      Patient Name: Bradley Lee  Medical Record Number:  [id number]  Primary Care Physician:  Monica Whitley MD  Date of Consultation: 6/29/2022    Chief Complaint: Oral lesions      Interval History    Patient following up for her lichen planus in her mouth. I last saw her in December 2021. She notices that sometimes he gets a little more irritated when she drinks carbonated beverages. Otherwise she does not think it has changed. No other complaints today. REVIEW OF SYSTEMS  As above    PHYSICAL EXAM  GENERAL: No Acute Distress, Alert and Oriented, no Hoarseness, strong voice  EYES: EOMI, Anti-icteric  HENT:   Head: Normocephalic and atraumatic. Face:  Symmetric, facial nerve intact, no sinus tenderness  Right Ear: Normal external ear, normal external auditory canal, intact tympanic membrane with normal mobility and aerated middle ear  Left Ear: Normal external ear, normal external auditory canal, intact tympanic membrane with normal mobility and aerated middle ear  Mouth/Oral Cavity: She still has some irritation in the posterior buccal mucosa bilaterally a little bit worse on the left. No concerning ulcerative lesions or significant changes. Oropharynx/Larynx:  normal oropharynx  Nose:Normal external nasal appearance. NECK: Normal range of motion, no thyromegaly, trachea is midline, no lymphadenopathy, no neck masses, no crepitus          ASSESSMENT/PLAN  1. Lichen planus  I again do not think that this is changed significantly. She has identified one of her triggers being carbonated beverages. I told her that we can just do a yearly follow-up unless there is changes. She has agreed. I have performed a head and neck physical exam personally or was physically present during the key or critical portions of the service.     This note was generated completely or in part utilizing Dragon dictation speech recognition software. Occasionally, words are mistranscribed and despite editing, the text may contain inaccuracies due to incorrect word recognition. If further clarification is needed please contact the office at [phone].

## 2022-07-01 ENCOUNTER — HOSPITAL ENCOUNTER (OUTPATIENT)
Dept: PHYSICAL THERAPY | Age: 62
Setting detail: THERAPIES SERIES
Discharge: HOME | End: 2022-07-01
Payer: COMMERCIAL

## 2022-07-01 PROCEDURE — 97530 THERAPEUTIC ACTIVITIES: CPT

## 2022-07-01 PROCEDURE — 97161 PT EVAL LOW COMPLEX 20 MIN: CPT

## 2022-07-01 PROCEDURE — 97140 MANUAL THERAPY 1/> REGIONS: CPT

## 2022-07-01 NOTE — FLOWSHEET NOTE
[facility] - Outpatient Rehabilitation and Therapy, [place]  [address] [place] [address]  Phone: [phone]   Fax:     [phone]      Physical Therapy Treatment Note/ Progress Report:     Date:  2022    Patient Name:  Trupti Wong    :  1960  MRN: [id number]    Pertinent Medical History: asthma, lumbar DDD, DM, HTN, obesity, depression, B TKR    Medical/Treatment Diagnosis Information:  · Diagnosis: M70.71 (ICD-10-CM) - Ischial bursitis of right side  · Treatment Diagnosis: Decreased mobility, strength    Insurance/Certification information:  PT Insurance Information: Mo Mendoza thru [address]  Physician Information:  Referring Provider (secondary): SHLOMO Carpio  Plan of care signed (Y/N): sent for cosign     Date of Patient follow up with Physician:      Progress Report: []  Yes  [x]  No     Date Range for reporting period:  Beginnin2022  Ending:      Progress report due (10 Rx/or 30 days whichever is less): 91    Recertification due (POC duration/ or 90 days whichever is less): 22    Visit # POC/Insurance Allowable Auth Needed  Thru ANGELO    eval  [x]Yes   []No     Latex Allergy:  [x]NO      []YES  Preferred Language for Healthcare:   [x]English       []Other:    Functional Scale:       Date assessed: at eval  Test:FOTO - hip  Score: 57    Pain level:  5/10     History of Injury: Pt reports onset of R buttock pain on and off for 5 years which she has been able to control by not sitting for prolonged periods. Pt notes she does aquatic exercise 3x/week on her own that she learned in previous rounds of therapy. Pt notes sitting in a chair is the main thing that aggravates her pain. If she sits side saddle relieving her R side she can sit longer. Also notes she does better with a more cushioned seat.   Pt gets relief of buttock pain with standing but notes she has some LBP with prolonged standing. Sitting tolerance approximately 15 mins. SUBJECTIVE:  See eval    OBJECTIVE:   Observation:   · Palpation: pt point tender at R ischial tuberosity with moderate tenderness noted  · Functional Mobility/Transfers: pt tends to sit side saddle with decreased Wbing through right buttock  · Posture: slight forward flexed posture  · Gait: (include devices/WB status) Pt ambulates with antalgic gait with bent knee and increased ER on R LE. Decreased heel strike noted. Pt reports she ambulates this way due to R knee pain   Test measurements:    ROM:  Date              Hip Flexion Hip Extension Hip Abduction Hip IR Hip ER   Eval  7/1 WNL Min decreased  WNL Min decreased               Strength:  Date Hip flexion Hip abduction Hip Extension Hip IR Hip ER Quad Hamstring Ankle DF Ankle PF   Eval 7/1 5/5 4+/5 4/5  4+/5 4/5 5/5 5/5 5/5                   RESTRICTIONS/PRECAUTIONS:     Exercises/Interventions:     Therapeutic Ex (86944)   Min: Reps/Resistance Notes/CUES   Seated Tband hip IR add                        Therapeutic Activity (59826) Min: 10     Pt education Educated on PT role, pathology, and modification of prolonged sitting. Reviewed use of towel to unload R ischial tuberosity with sitting. NMR re-education (22790)  Min:  CUES NEEDED             Manual Intervention ([address] [facility]) Min:  15     STM R HS at ischial insertion, hip lateral rotators x15 mins in L slying         Manual Piriformis stretch Add     Hip PROM ext, IR Add          Modalities  Min:     CP PRN                Other Therapeutic Activities: Pt was educated on PT POC, Diagnosis, Prognosis, pathomechanics as well as frequency and duration of scheduling future physical therapy appointments. Time was also taken on this day to answer all patient questions and participation in PT. Reviewed appointment policy in detail with patient and patient verbalized understanding.      Home Exercise Program: Patient was instructed in the following for HEP: supine piriformis stretch. Patient verbalized/demonstrated understanding and was issued written handout. Therapeutic Exercise and NMR EXR  [] (11595) Provided verbal/tactile cueing for activities related to strengthening, flexibility, endurance, ROM for improvements in LE, proximal hip, and core control with self care, mobility, lifting, ambulation.  [] (30227) Provided verbal/tactile cueing for activities related to improving balance, coordination, kinesthetic sense, posture, motor skill, proprioception  to assist with LE, proximal hip, and core control in self care, mobility, lifting, ambulation and eccentric single leg control.  [address] and Therapeutic Activities:    [x] (96312 or 59284) Provided verbal/tactile cueing for activities related to improving balance, coordination, kinesthetic sense, posture, motor skill, proprioception and motor activation to allow for proper function of core, proximal hip and LE with self care and ADLs and functional mobility.   [] (94130) Gait Re-education- Provided training and instruction to the patient for proper LE, core and proximal hip recruitment and positioning and eccentric body weight control with ambulation re-education including up and down stairs     Home Exercise Program:    [] (59256) Reviewed/Progressed HEP activities related to strengthening, flexibility, endurance, ROM of core, proximal hip and LE for functional self-care, mobility, lifting and ambulation/stair navigation   [] (78980)Reviewed/Progressed HEP activities related to improving balance, coordination, kinesthetic sense, posture, motor skill, proprioception of core, proximal hip and LE for self care, mobility, lifting, and ambulation/stair navigation      Manual Treatments:  PROM / STM / Oscillations-Mobs:  G-I, II, III, IV (PA's, Inf., Post.)  [x] (25532) Provided manual therapy to mobilize LE, proximal hip and/or LS spine soft tissue/joints for the purpose of modulating to sit for greater than 15 mins without increased symptoms or restriction. []? Progressing: []? Met: []? Not Met: []? Adjusted         ASSESSMENT:  See eval    Treatment/Activity Tolerance:  [x] Patient tolerated treatment well [] Patient limited by fatique  [] Patient limited by pain  [] Patient limited by other medical complications  [] Other:     Overall Progression Towards Functional goals/ Treatment Progress Update:  [] Patient is progressing as expected towards functional goals listed. [] Progression is slowed due to complexities/Impairments listed. [] Progression has been slowed due to co-morbidities. [x] Plan just implemented, too soon to assess goals progression <30days   [] Goals require adjustment due to lack of progress  [] Patient is not progressing as expected and requires additional follow up with physician  [] Other    Prognosis for POC: [x] Good [] Fair  [] Poor    Patient requires continued skilled intervention: [x] Yes  [] No        PLAN:   [] Continue per plan of care [] Alter current plan (see comments)  [x] Plan of care initiated [] Hold pending MD visit [] Discharge    Electronically signed by: Critshian Crump PT , OMT-C,  [id number]      Note: If patient does not return for scheduled/recommended follow up visits, this note will serve as a discharge from care along with the most recent update on progress.

## 2022-07-01 NOTE — PLAN OF CARE
[facility], [facility]  [address] [facility] [place], [facility]  Phone: [phone]   Fax:     [phone]                                                       Physical Therapy Certification    Dear Referring Provider (secondary): SHLOMO Stokes    We had the pleasure of evaluating the following patient for physical therapy services at [facility]. A summary of our findings can be found in the initial assessment below. This includes our plan of care. If you have any questions or concerns regarding these findings, please do not hesitate to contact me at the office phone number checked above. Thank you for the referral.       Physician Signature:_______________________________Date:__________________  By signing above (or electronic signature), therapists plan is approved by physician              Patient: Woo Moran   : 1960   MRN: [id number]  Referring Physician: Referring Provider (secondary): SHLOMO Stokes      Evaluation Date: 2022      Medical Diagnosis Information:  Diagnosis: M70.71 (ICD-10-CM) - Ischial bursitis of right side   Treatment Diagnosis: Decreased mobility, strength                                         Insurance information: PT Insurance Information: [employer]     Precautions/ Contra-indications:   Latex Allergy:  [x]NO      []YES  Preferred Language for Healthcare:   [x]English       []other:    C-SSRS Triggered by Intake questionnaire (Past 2 wk assessment ):   [x] No, Questionnaire did not trigger screening.   [] Yes, Patient intake triggered C-SSRS Screening      [] C-SSRS Screening completed  [] PCP notified via Epic     SUBJECTIVE: Patient stated complaint:  Pt reports onset of R buttock pain on and off for 5 years which she has been able to control by not sitting for prolonged periods.   Pt notes she does aquatic normal bilaterally   []Other:    Joint mobility:    [x]Normal    []Hypo   []Hyper    Orthopedic Special Tests: Remington Yates (+) mild tightness B                       [x] Patient history, allergies, meds reviewed. Medical chart reviewed. See intake form. Review Of Systems (ROS):  [x]Performed Review of systems (Integumentary, CardioPulmonary, Neurological) by intake and observation. Intake form has been scanned into medical record. Patient has been instructed to contact their primary care physician regarding ROS issues if not already being addressed at this time. Co-morbidities/Complexities (which will affect course of rehabilitation):   []None           Arthritic conditions   []Rheumatoid arthritis (M05.9)  []Osteoarthritis (M19.91)   Cardiovascular conditions   [x]Hypertension (I10)  []Hyperlipidemia (E78.5)  []Angina pectoris (I20)  []Atherosclerosis (I70)  []CVA Musculoskeletal conditions   []Disc pathology   []Congenital spine pathologies   [x]Prior surgical intervention  []Osteoporosis (M81.8)  []Osteopenia (M85.8)   Endocrine conditions   []Hypothyroid (E03.9)  []Hyperthyroid Gastrointestinal conditions   []Constipation (Y67.47)   Metabolic conditions   [x]Morbid obesity (E66.01)  [x]Diabetes type 1(E10.65) or 2 (E11.65)   []Neuropathy (G60.9)     Pulmonary conditions   []Asthma (J45)  []Coughing   []COPD (J44.9)   Psychological Disorders  []Anxiety (F41.9)  [x]Depression (F32.9)   []Other:   []Other:          Barriers to/and or personal factors that will affect rehab potential:              []Age  []Sex    []Smoker              []Motivation/Lack of Motivation                        [x]Co-Morbidities              []Cognitive Function, education/learning barriers              []Environmental, home barriers              []profession/work barriers  []past PT/medical experience  []other:  Justification:     Falls Risk Assessment (30 days):   [x] Falls Risk assessed and no intervention required.   [] Falls Risk assessed and Patient requires intervention due to being higher risk   TUG score (>12s at risk):     [] Falls education provided, including         ASSESSMENT:   Functional Impairments:     []Noted lumbar/proximal hip/LE hypomobility   []Decreased LE functional ROM   [x]Decreased core/proximal hip strength and neuromuscular control   [x]Decreased LE functional strength   []Reduced balance/proprioceptive control   []other:      Functional Activity Limitations (from functional questionnaire and intake)   [x]Reduced ability to tolerate prolonged functional positions   []Reduced ability or difficulty with changes of positions or transfers between positions   [x]Reduced ability to maintain good posture and demonstrate good body mechanics with sitting, bending, and lifting   []Reduced ability to sleep   [] Reduced ability or tolerance with driving and/or computer work   []Reduced ability to perform lifting, carrying tasks   []Reduced ability to squat   []Reduced ability to forward bend   [x]Reduced ability to ambulate prolonged functional periods/distances/surfaces   []Reduced ability to ascend/descend stairs   []Reduced ability to run, hop or jump   []other:     Participation Restrictions   [x]Reduced participation in self care activities   [x]Reduced participation in home management activities   []Reduced participation in work activities   []Reduced participation in social activities. []Reduced participation in sport activities. Classification :    []Signs/symptoms consistent with post-surgical status including decreased ROM, strength and function.    []Signs/symptoms consistent with joint sprain/strain   []Signs/symptoms consistent with patella-femoral syndrome   []Signs/symptoms consistent with knee OA/hip OA   []Signs/symptoms consistent with internal derangement of knee/Hip   []Signs/symptoms consistent with functional hip weakness/NMR control      [x]Signs/symptoms consistent with tendinitis/tendinosis/bursitis    [x]signs/symptoms consistent with pathology which may benefit from Dry needling      []other:      Prognosis/Rehab Potential:      []Excellent   [x]Good    []Fair   []Poor    Tolerance of evaluation/treatment:    [x]Excellent   []Good    []Fair   []Poor    Physical Therapy Evaluation Complexity Justification  [x] A history of present problem with:  [] no personal factors and/or comorbidities that impact the plan of care;  [x]1-2 personal factors and/or comorbidities that impact the plan of care  []3 personal factors and/or comorbidities that impact the plan of care  [x] An examination of body systems using standardized tests and measures addressing any of the following: body structures and functions (impairments), activity limitations, and/or participation restrictions;:  [x] a total of 1-2 or more elements   [] a total of 3 or more elements   [] a total of 4 or more elements   [x] A clinical presentation with:  [x] stable and/or uncomplicated characteristics   [] evolving clinical presentation with changing characteristics  [] unstable and unpredictable characteristics;   [x] Clinical decision making of [x] low, [] moderate, [] high complexity using standardized patient assessment instrument and/or measurable assessment of functional outcome. [x] EVAL (LOW) 68941 (typically 20 minutes face-to-face)  [] EVAL (MOD) 23230 (typically 30 minutes face-to-face)  [] EVAL (HIGH) 25979 (typically 45 minutes face-to-face)  [] RE-EVAL     PLAN:  Frequency/Duration:  2 days per week for 4 Weeks:  Interventions:  [x]  Therapeutic exercise including: strength training, ROM, for Lower extremity and core   [x]  NMR activation and proprioception for LE, Glutes and Core   [x]  Manual therapy as indicated for LE, Hip and spine to include: Dry Needling/IASTM, STM, PROM, Gr I-IV mobilizations, manipulation.    [x] Modalities as needed that may include: thermal agents, E-stim, Biofeedback, US, iontophoresis as indicated  [x] Patient education on joint protection, postural re-education, activity modification, progression of HEP. [x] Aquatic exercise including: strength training, ROM, and balance for Lower extremity and core     HEP instruction: Patient instructed in supine piriformis stretch for HEP. Patient verbalized/demonstrated understanding and was issued written handout. GOALS:  Patient stated goal: \"Be able to sit upright longer\"  [] Progressing: [] Met: [] Not Met: [] Adjusted    Therapist goals for Patient:   Short Term Goals: To be achieved in: 2 weeks  1. Independent in HEP and progression per patient tolerance, in order to prevent re-injury. [] Progressing: [] Met: [] Not Met: [] Adjusted  2. Patient will have a decrease in pain by 40% to facilitate improvement in movement, function, and ADLs as indicated by Functional Deficits. [] Progressing: [] Met: [] Not Met: [] Adjusted    Long Term Goals: To be achieved in: at discharge  1. Increase FOTO functional outcome score from 47 to 57 to assist with reaching prior level of function. [] Progressing: [] Met: [] Not Met: [] Adjusted  2. Patient will demonstrate an increase in R LE Strength to 5/5 to allow for proper functional mobility as indicated by patients Functional Deficits. [] Progressing: [] Met: [] Not Met: [] Adjusted  3. Patient will return to household duties without increased symptoms or restriction. [] Progressing: [] Met: [] Not Met: [] Adjusted  4. Patient will be able to sit for greater than 15 mins without increased symptoms or restriction. [] Progressing: [] Met: [] Not Met: [] Adjusted     Electronically signed by:  Shanika Rosenberg PT , OMT-C,  [id number]    Note: If patient does not return for scheduled/recommended follow up visits, this note will serve as a discharge from care along with the most recent update on progress.

## 2022-07-06 ENCOUNTER — HOSPITAL ENCOUNTER (OUTPATIENT)
Dept: PHYSICAL THERAPY | Age: 62
Setting detail: THERAPIES SERIES
Discharge: HOME | End: 2022-07-06
Payer: COMMERCIAL

## 2022-07-06 PROCEDURE — 97140 MANUAL THERAPY 1/> REGIONS: CPT

## 2022-07-06 PROCEDURE — 97110 THERAPEUTIC EXERCISES: CPT

## 2022-07-06 NOTE — FLOWSHEET NOTE
[facility], [facility]  [address] [facility], [facility]  Phone: [phone]   Fax:     [phone]      Physical Therapy Treatment Note/ Progress Report:     Date:  2022    Patient Name:  Mihaela Gaitan    :  1960  MRN: [id number]    Pertinent Medical History: asthma, lumbar DDD, DM, HTN, obesity, depression, B TKR    Medical/Treatment Diagnosis Information:  · Diagnosis: M70.71 (ICD-10-CM) - Ischial bursitis of right side  · Treatment Diagnosis: Decreased mobility, strength    Insurance/Certification information:  PT Insurance Information: Adam Jaime thru [address]  Physician Information:  Referring Provider (secondary): SHLOMO Wilcox  Plan of care signed (Y/N): sent for cosign  (received)    Date of Patient follow up with Physician:      Progress Report: []  Yes  [x]  No     Date Range for reporting period:  Beginnin2022  Ending:      Progress report due (10 Rx/or 30 days whichever is less): 86    Recertification due (POC duration/ or 90 days whichever is less): 22    Visit # POC/Insurance Allowable Auth Needed  Thru ANGELO    8 visits approved  to 22  [x]Yes   []No     Latex Allergy:  [x]NO      []YES  Preferred Language for Healthcare:   [x]English       []Other:    Functional Scale:       Date assessed: at eval  Test:FOTO - hip  Score: 57    Pain level:  5/10     History of Injury: Pt reports onset of R buttock pain on and off for 5 years which she has been able to control by not sitting for prolonged periods. Pt notes she does aquatic exercise 3x/week on her own that she learned in previous rounds of therapy. Pt notes sitting in a chair is the main thing that aggravates her pain. If she sits side saddle relieving her R side she can sit longer. Also notes she does better with a more cushioned seat.   Pt gets relief of buttock pain with standing but notes she has some LBP with prolonged standing. Sitting tolerance approximately 15 mins. SUBJECTIVE:  7/6: Notes her pain has been a little bit better due to not sitting for prolonged periods. OBJECTIVE:   Observation:   · Palpation: pt point tender at R ischial tuberosity with moderate tenderness noted  · Functional Mobility/Transfers: pt tends to sit side saddle with decreased Wbing through right buttock  · Posture: slight forward flexed posture  · Gait: (include devices/WB status) Pt ambulates with antalgic gait with bent knee and increased ER on R LE. Decreased heel strike noted. Pt reports she ambulates this way due to R knee pain   Test measurements:    ROM:  Date              Hip Flexion Hip Extension Hip Abduction Hip IR Hip ER   Eval  7/1 WNL Min decreased  WNL Min decreased               Strength:  Date Hip flexion Hip abduction Hip Extension Hip IR Hip ER Quad Hamstring Ankle DF Ankle PF   Eval 7/1 5/5 4+/5 4/5  4+/5 4/5 5/5 5/5 5/5                   RESTRICTIONS/PRECAUTIONS:     Exercises/Interventions:     Therapeutic Ex (87798)   Min: 8 Reps/Resistance Notes/CUES   Seated Tband hip IR                          HS curls Red 20x R  Red 20x R         Review of pool exercises x3 mins         Standing hip IR Add     bridge add    Therapeutic Activity (81633) Min: 0                    NMR re-education (52141)  Min:  CUES NEEDED             Manual Intervention (23028) Min:  30     STM R HS at ischial insertion, hip lateral rotators x15 mins in L slying         Manual R Piriformis/Hip flexor stretch x5 mins    Hip PROM ext, IR x10 mins         Modalities  Min:     CP PRN                Other Therapeutic Activities: Pt was educated on PT POC, Diagnosis, Prognosis, pathomechanics as well as frequency and duration of scheduling future physical therapy appointments. Time was also taken on this day to answer all patient questions and participation in PT.  Reviewed appointment policy in detail with patient and patient verbalized understanding. Home Exercise Program: Patient was instructed in the following for HEP: supine piriformis stretch. Patient verbalized/demonstrated understanding and was issued written handout. Therapeutic Exercise and NMR EXR  [] (43515) Provided verbal/tactile cueing for activities related to strengthening, flexibility, endurance, ROM for improvements in LE, proximal hip, and core control with self care, mobility, lifting, ambulation.  [] (45538) Provided verbal/tactile cueing for activities related to improving balance, coordination, kinesthetic sense, posture, motor skill, proprioception  to assist with LE, proximal hip, and core control in self care, mobility, lifting, ambulation and eccentric single leg control.  [address] and Therapeutic Activities:    [x] (62204 or 27835) Provided verbal/tactile cueing for activities related to improving balance, coordination, kinesthetic sense, posture, motor skill, proprioception and motor activation to allow for proper function of core, proximal hip and LE with self care and ADLs and functional mobility.   [] (48303) Gait Re-education- Provided training and instruction to the patient for proper LE, core and proximal hip recruitment and positioning and eccentric body weight control with ambulation re-education including up and down stairs     Home Exercise Program:    [] (03295) Reviewed/Progressed HEP activities related to strengthening, flexibility, endurance, ROM of core, proximal hip and LE for functional self-care, mobility, lifting and ambulation/stair navigation   [] (39744)Reviewed/Progressed HEP activities related to improving balance, coordination, kinesthetic sense, posture, motor skill, proprioception of core, proximal hip and LE for self care, mobility, lifting, and ambulation/stair navigation      Manual Treatments:  PROM / STM / Oscillations-Mobs:  G-I, II, III, IV (PA's, Inf., Post.)  [x] (52595) Provided manual therapy to mobilize LE, proximal hip and/or LS spine soft tissue/joints for the purpose of modulating pain, promoting relaxation,  increasing ROM, reducing/eliminating soft tissue swelling/inflammation/restriction, improving soft tissue extensibility and allowing for proper ROM for normal function with self care, mobility, lifting and ambulation. Charges:  Timed Code Treatment Minutes: 38   Total Treatment Minutes: 38      [] EVAL (LOW) 03921 (typically 20 minutes face-to-face)  [] EVAL (MOD) 65589 (typically 30 minutes face-to-face)  [] EVAL (HIGH) 83500 (typically 45 minutes face-to-face)  [] RE-EVAL     [x] BR(88931) x     [] Dry needle 1 or 2 Muscles (18649)  [] NMR (71599) x     [] Dry needle 3+ Muscles (43282)  [x] Manual (69684) x  2   [] Ultrasound (40980) x  [] TA (86782) x     [] Mech Traction (53741)  [] ES(attended) (81044)     [] ES (un) (11493):   [] Vasopump (04453) [] Ionto (75865)   [] Other:    GOALS:  Patient stated goal: \"Be able to sit upright longer\"  []? Progressing: []? Met: []? Not Met: []? Adjusted     Therapist goals for Patient:   Short Term Goals: To be achieved in: 2 weeks  1. Independent in HEP and progression per patient tolerance, in order to prevent re-injury. []? Progressing: []? Met: []? Not Met: []? Adjusted  2. Patient will have a decrease in pain by 40% to facilitate improvement in movement, function, and ADLs as indicated by Functional Deficits. []? Progressing: []? Met: []? Not Met: []? Adjusted     Long Term Goals: To be achieved in: at discharge  1. Increase FOTO functional outcome score from 47 to 57 to assist with reaching prior level of function. []? Progressing: []? Met: []? Not Met: []? Adjusted  2. Patient will demonstrate an increase in R LE Strength to 5/5 to allow for proper functional mobility as indicated by patients Functional Deficits. []? Progressing: []? Met: []? Not Met: []? Adjusted  3.  Patient will return to household duties without increased symptoms or restriction. []? Progressing: []? Met: []? Not Met: []? Adjusted  4. Patient will be able to sit for greater than 15 mins without increased symptoms or restriction. []? Progressing: []? Met: []? Not Met: []? Adjusted         ASSESSMENT:  Pt with good tolerance to PT session today. Less TTP noted R ischial tuberosity during STM. Discussed gait pattern and keeping R LE in a more neutral position to avoid overuse of hip. Pt instructed to use cane for prolonged ambulation to assist with offloading R knee pain and allow for improved gait pattern. Treatment/Activity Tolerance:  [x] Patient tolerated treatment well [] Patient limited by fatique  [] Patient limited by pain  [] Patient limited by other medical complications  [] Other:     Overall Progression Towards Functional goals/ Treatment Progress Update:  [] Patient is progressing as expected towards functional goals listed. [] Progression is slowed due to complexities/Impairments listed. [] Progression has been slowed due to co-morbidities. [x] Plan just implemented, too soon to assess goals progression <30days   [] Goals require adjustment due to lack of progress  [] Patient is not progressing as expected and requires additional follow up with physician  [] Other    Prognosis for POC: [x] Good [] Fair  [] Poor    Patient requires continued skilled intervention: [x] Yes  [] No        PLAN:   [] Continue per plan of care [] Alter current plan (see comments)  [x] Plan of care initiated [] Hold pending MD visit [] Discharge    Electronically signed by: Benjamin Childs PT , OMT-C,  [id number]      Note: If patient does not return for scheduled/recommended follow up visits, this note will serve as a discharge from care along with the most recent update on progress.

## 2022-07-11 ENCOUNTER — HOSPITAL ENCOUNTER (OUTPATIENT)
Dept: PHYSICAL THERAPY | Age: 62
Setting detail: THERAPIES SERIES
Discharge: HOME | End: 2022-07-11
Payer: COMMERCIAL

## 2022-07-11 PROCEDURE — 97140 MANUAL THERAPY 1/> REGIONS: CPT

## 2022-07-11 PROCEDURE — 97110 THERAPEUTIC EXERCISES: CPT

## 2022-07-11 NOTE — FLOWSHEET NOTE
[facility] - Outpatient Rehabilitation and Therapy, [facility]  [address] [facility] [place], [facility]  Phone: [phone]   Fax:     [phone]      Physical Therapy Treatment Note/ Progress Report:     Date:  2022    Patient Name:  Laney Olmstead    :  1960  MRN: [id number]    Pertinent Medical History: asthma, lumbar DDD, DM, HTN, obesity, depression, B TKR    Medical/Treatment Diagnosis Information:  · Diagnosis: M70.71 (ICD-10-CM) - Ischial bursitis of right side  · Treatment Diagnosis: Decreased mobility, strength    Insurance/Certification information:  PT Insurance Information: Sukumar Perez thru [address]  Physician Information:  Referring Provider (secondary): SHLOMO Hagen  Plan of care signed (Y/N): sent for cosign  (received)    Date of Patient follow up with Physician:      Progress Report: []  Yes  [x]  No     Date Range for reporting period:  Beginnin2022  Ending:      Progress report due (10 Rx/or 30 days whichever is less): 3/4/51    Recertification due (POC duration/ or 90 days whichever is less): 22    Visit # POC/Insurance Allowable Auth Needed  Thru ANGELO   3/8 8 visits approved  to 22  [x]Yes   []No     Latex Allergy:  [x]NO      []YES  Preferred Language for Healthcare:   [x]English       []Other:    Functional Scale:       Date assessed: at eval  Test:FOTO - hip  Score: 57    Pain level:  0-1/10     History of Injury: Pt reports onset of R buttock pain on and off for 5 years which she has been able to control by not sitting for prolonged periods. Pt notes she does aquatic exercise 3x/week on her own that she learned in previous rounds of therapy. Pt notes sitting in a chair is the main thing that aggravates her pain. If she sits side saddle relieving her R side she can sit longer. Also notes she does better with a more cushioned seat.   Pt gets relief of buttock pain with standing but notes she has some LBP with prolonged standing. Sitting tolerance approximately 15 mins. SUBJECTIVE:  7/6: Notes her pain has been a little bit better due to not sitting for prolonged periods. 7-11-22   Pain not much today due to has not been sitting. OBJECTIVE:   Observation:   · Palpation: pt point tender at R ischial tuberosity with moderate tenderness noted  · Functional Mobility/Transfers: pt tends to sit side saddle with decreased Wbing through right buttock  · Posture: slight forward flexed posture  · Gait: (include devices/WB status) Pt ambulates with antalgic gait with bent knee and increased ER on R LE. Decreased heel strike noted. Pt reports she ambulates this way due to R knee pain   Test measurements:    ROM:  Date              Hip Flexion Hip Extension Hip Abduction Hip IR Hip ER   Eval  7/1 WNL Min decreased  WNL Min decreased               Strength:  Date Hip flexion Hip abduction Hip Extension Hip IR Hip ER Quad Hamstring Ankle DF Ankle PF   Eval 7/1 5/5 4+/5 4/5  4+/5 4/5 5/5 5/5 5/5                   RESTRICTIONS/PRECAUTIONS:     Exercises/Interventions:     Therapeutic Ex (14342)   Min: 8 Reps/Resistance Notes/CUES   Seated Tband hip IR                          HS curls Red 20x R  Red 20x R         Review of pool exercises          Standing hip IR X 10 R ROM as dion so as not to aggravate R knee   bridge X 10  ROM as dion due to decreased R knee flex   Therapeutic Activity (00013) Min: 0                    NMR re-education (87993)  Min:  CUES NEEDED             Manual Intervention (98085) Min:  30     STM R HS at ischial insertion, hip lateral rotators x15 mins in L slying         Manual R Piriformis/Hip flexor stretch x5 mins    Hip PROM ext, IR x10 mins         Modalities  Min:     CP PRN Pt declined today, instructed on how to do at home if needed.                 Other Therapeutic Activities: Pt was educated on PT POC, Diagnosis, Prognosis, pathomechanics as well as frequency and duration of scheduling future physical therapy appointments. Time was also taken on this day to answer all patient questions and participation in PT. Reviewed appointment policy in detail with patient and patient verbalized understanding. Home Exercise Program: Patient was instructed in the following for HEP: supine piriformis stretch. Patient verbalized/demonstrated understanding and was issued written handout. Therapeutic Exercise and NMR EXR  [] (87628) Provided verbal/tactile cueing for activities related to strengthening, flexibility, endurance, ROM for improvements in LE, proximal hip, and core control with self care, mobility, lifting, ambulation.  [] (28307) Provided verbal/tactile cueing for activities related to improving balance, coordination, kinesthetic sense, posture, motor skill, proprioception  to assist with LE, proximal hip, and core control in self care, mobility, lifting, ambulation and eccentric single leg control.  [address] and Therapeutic Activities:    [x] (10587 or 54953) Provided verbal/tactile cueing for activities related to improving balance, coordination, kinesthetic sense, posture, motor skill, proprioception and motor activation to allow for proper function of core, proximal hip and LE with self care and ADLs and functional mobility.   [] (20283) Gait Re-education- Provided training and instruction to the patient for proper LE, core and proximal hip recruitment and positioning and eccentric body weight control with ambulation re-education including up and down stairs     Home Exercise Program:    [] (67642) Reviewed/Progressed HEP activities related to strengthening, flexibility, endurance, ROM of core, proximal hip and LE for functional self-care, mobility, lifting and ambulation/stair navigation   [] (60254)Reviewed/Progressed HEP activities related to improving balance, coordination, kinesthetic sense, posture, motor skill, proprioception of core, proximal hip and LE for self care, mobility, lifting, and ambulation/stair navigation      Manual Treatments:  PROM / STM / Oscillations-Mobs:  G-I, II, III, IV (PA's, Inf., Post.)  [x] (46265) Provided manual therapy to mobilize LE, proximal hip and/or LS spine soft tissue/joints for the purpose of modulating pain, promoting relaxation,  increasing ROM, reducing/eliminating soft tissue swelling/inflammation/restriction, improving soft tissue extensibility and allowing for proper ROM for normal function with self care, mobility, lifting and ambulation. Charges:  Timed Code Treatment Minutes: 38   Total Treatment Minutes: 38      [] EVAL (LOW) 67090 (typically 20 minutes face-to-face)  [] EVAL (MOD) 35420 (typically 30 minutes face-to-face)  [] EVAL (HIGH) 57320 (typically 45 minutes face-to-face)  [] RE-EVAL     [x] CL(89276) x     [] Dry needle 1 or 2 Muscles (16585)  [] NMR (39592) x     [] Dry needle 3+ Muscles (05045)  [x] Manual (46367) x  2   [] Ultrasound (11159) x  [] TA (36536) x     [] Mech Traction (16999)  [] ES(attended) (88245)     [] ES (un) (52920):   [] Vasopump (62054) [] Ionto (10811)   [] Other:    GOALS:  Patient stated goal: \"Be able to sit upright longer\"  []? Progressing: []? Met: []? Not Met: []? Adjusted     Therapist goals for Patient:   Short Term Goals: To be achieved in: 2 weeks  1. Independent in HEP and progression per patient tolerance, in order to prevent re-injury. []? Progressing: []? Met: []? Not Met: []? Adjusted  2. Patient will have a decrease in pain by 40% to facilitate improvement in movement, function, and ADLs as indicated by Functional Deficits. []? Progressing: []? Met: []? Not Met: []? Adjusted     Long Term Goals: To be achieved in: at discharge  1. Increase FOTO functional outcome score from 47 to 57 to assist with reaching prior level of function. []? Progressing: []? Met: []? Not Met: []? Adjusted  2.  Patient will demonstrate an increase in R LE Strength to 5/5 to allow for proper functional mobility as indicated by patients Functional Deficits. []? Progressing: []? Met: []? Not Met: []? Adjusted  3. Patient will return to household duties without increased symptoms or restriction. []? Progressing: []? Met: []? Not Met: []? Adjusted  4. Patient will be able to sit for greater than 15 mins without increased symptoms or restriction. []? Progressing: []? Met: []? Not Met: []? Adjusted         ASSESSMENT:  Pt with good tolerance to PT session today. Less TTP noted R ischial tuberosity during STM. Discussed gait pattern and keeping R LE in a more neutral position to avoid overuse of hip. Pt instructed to use cane for prolonged ambulation to assist with offloading R knee pain and allow for improved gait pattern. 7-11-22 no c/o of  Pain after Rx. Mild TTP R ischial tuberosity. Treatment/Activity Tolerance:  [x] Patient tolerated treatment well [] Patient limited by fatique  [] Patient limited by pain  [] Patient limited by other medical complications  [] Other:     Overall Progression Towards Functional goals/ Treatment Progress Update:  [] Patient is progressing as expected towards functional goals listed. [] Progression is slowed due to complexities/Impairments listed. [] Progression has been slowed due to co-morbidities.   [x] Plan just implemented, too soon to assess goals progression <30days   [] Goals require adjustment due to lack of progress  [] Patient is not progressing as expected and requires additional follow up with physician  [] Other    Prognosis for POC: [x] Good [] Fair  [] Poor    Patient requires continued skilled intervention: [x] Yes  [] No        PLAN:   [] Continue per plan of care [] Alter current plan (see comments)  [x] Plan of care initiated [] Hold pending MD visit [] Discharge    Electronically signed by: John Rodriguez,       Note: If patient does not return for scheduled/recommended follow up visits, this note will serve as a discharge from care along with the most recent update on progress.

## 2022-07-12 ENCOUNTER — OFFICE VISIT (OUTPATIENT)
Dept: SLEEP MEDICINE | Age: 62
End: 2022-07-12
Payer: COMMERCIAL

## 2022-07-12 VITALS
BODY MASS INDEX: 43.4 KG/M2 | OXYGEN SATURATION: 97 % | SYSTOLIC BLOOD PRESSURE: 130 MMHG | HEIGHT: 69 IN | DIASTOLIC BLOOD PRESSURE: 80 MMHG | RESPIRATION RATE: 21 BRPM | TEMPERATURE: 96.9 F | WEIGHT: 293 LBS | HEART RATE: 76 BPM

## 2022-07-12 DIAGNOSIS — I27.20 PULMONARY HTN (HCC): ICD-10-CM

## 2022-07-12 DIAGNOSIS — G47.33 OSA ON CPAP: Primary | ICD-10-CM

## 2022-07-12 DIAGNOSIS — E66.01 CLASS 3 SEVERE OBESITY DUE TO EXCESS CALORIES WITH SERIOUS COMORBIDITY AND BODY MASS INDEX (BMI) OF 45.0 TO 49.9 IN ADULT (HCC): ICD-10-CM

## 2022-07-12 DIAGNOSIS — Z99.89 OSA ON CPAP: Primary | ICD-10-CM

## 2022-07-12 DIAGNOSIS — Z99.89 DEPENDENCE ON OTHER ENABLING MACHINES AND DEVICES: ICD-10-CM

## 2022-07-12 PROCEDURE — 99214 OFFICE O/P EST MOD 30 MIN: CPT | Performed by: PSYCHIATRY & NEUROLOGY

## 2022-07-12 ASSESSMENT — SLEEP AND FATIGUE QUESTIONNAIRES
HOW LIKELY ARE YOU TO NOD OFF OR FALL ASLEEP WHILE SITTING AND READING: 1
HOW LIKELY ARE YOU TO NOD OFF OR FALL ASLEEP WHILE SITTING QUIETLY AFTER LUNCH WITHOUT ALCOHOL: 1
HOW LIKELY ARE YOU TO NOD OFF OR FALL ASLEEP WHEN YOU ARE A PASSENGER IN A CAR FOR AN HOUR WITHOUT A BREAK: 1
HOW LIKELY ARE YOU TO NOD OFF OR FALL ASLEEP IN A CAR, WHILE STOPPED FOR A FEW MINUTES IN TRAFFIC: 0
HOW LIKELY ARE YOU TO NOD OFF OR FALL ASLEEP WHILE WATCHING TV: 1
HOW LIKELY ARE YOU TO NOD OFF OR FALL ASLEEP WHILE LYING DOWN TO REST IN THE AFTERNOON WHEN CIRCUMSTANCES PERMIT: 1
HOW LIKELY ARE YOU TO NOD OFF OR FALL ASLEEP WHILE SITTING INACTIVE IN A PUBLIC PLACE: 3
ESS TOTAL SCORE: 8
HOW LIKELY ARE YOU TO NOD OFF OR FALL ASLEEP WHILE SITTING AND TALKING TO SOMEONE: 0

## 2022-07-12 NOTE — PROGRESS NOTES
Wm Sanchez         : 1960        PHONE: [phone] (home)   [] [address]     [] [address]      [] [employer]     [][employer]    [x] [employer]  [] [employer]     [] Other:    Diagnosis: [x] LESLIE (G47.33) [] CSA (G47.31) [] Apnea (G47.30)   Length of Need: [] 12 Months [x] 99 Months [] Other:    Machine (JOSETTE!): [] Respironics Dream Station      Auto [] ResMed AirSense     Auto [] Other:     []  CPAP () [] Bilevel ()   Mode: [] Auto [] Spontaneous    Mode: [] Auto [] Spontaneous                            Comfort Settings:   - Ramp Pressure: 5 cmH2O                                        - Ramp time: 15 min                                     -  Flex/EPR - 3 full time                                    - For ResMed Bilevel (TiMax-4 sec   TiMin- 0.2 sec)     Humidifier: [] Heated ()        [x] Water chamber replacement ()/ 1 per 6 months        Mask:   [x] Nasal () /1 per 3 months [] Full Face () /1 per 3 months   [x] Patient choice -Size and fit mask [] Patient Choice - Size and fit mask   [] Dispense:  [] Dispense:    [x] Headgear () / 1 per 3 months [] Headgear () / 1 per 3 months   [x] Replacement Nasal Cushion ()/2 per month [] Interface Replacement ()/1 per month   [x] Replacement Nasal Pillows ()/2 per month         Tubing: [x] Heated ()/1 per 3 months    [] Standard ()/1 per 3 months [] Other:           Filters: [x] Non-disposable ()/1 per 6 months     [x] Ultra-Fine, Disposable ()/2 per month        Miscellaneous: [] Chin Strap ()/ 1 per 6 months [] O2 bleed-in:       LPM   [] Oximetry on CPAP/Bilevel []  Other:          Start Order Date: 22    MEDICAL JUSTIFICATION:  I, the undersigned, certify that the above prescribed supplies are medically necessary for this patients wellbeing.   In my opinion, the supplies are both reasonable and necessary in reference to accepted standards of medicalpractice in treatment of this patients condition.     Felisa Miller MD      NPI: [id number]       Order Signed Date: 07/12/22    Electronically signed by Felisa Miller MD on 7/12/2022 at 1:30 PM

## 2022-07-12 NOTE — PATIENT INSTRUCTIONS
Patient Education        Learning About CPAP for Sleep Apnea  What is CPAP? CPAP is a small machine that you use at home every night while you sleep. It increases air pressure in your throat to keep your airway open. When you have sleep apnea, this can help you sleep better, feel better, and avoid futurehealth problems. CPAP stands for \"continuous positive airway pressure. \"  The CPAP machine will have one of the following:   A mask that covers your nose and mouth   A mask that covers your nose only   A nasal pillow that covers only the openings of your nose  Why is it done? CPAP is usually the best treatment for obstructive sleep apnea. It is the firsttreatment choice and the most widely used. CPAP:   Helps you have more normal sleep, so you feel less sleepy and more alert during the daytime.  May help keep heart failure or other heart problems from getting worse.  May help lower your blood pressure. If you have a bed partner, they may also sleep better when you use a CPAP. That's because you aren't snoring or restless. Your doctor may suggest CPAP if you have:   Moderate to severe sleep apnea.  Sleep apnea and coronary artery disease (CAD).  Sleep apnea and heart failure. What are the side effects? Some people who use CPAP have:   A dry or stuffy nose and a sore throat.  Irritated skin on the face.  Bloating. How can you care for yourself? If using CPAP is not comfortable, or if you have certain side effects, workwith your doctor to fix them. Here are some things you can try:   Be sure the mask, nasal mask, or nasal pillow fits well.  See if your doctor can adjust the pressure of your CPAP.  If your nose or mouth is dry, set the machine to deliver warmer or wetter air. Or try using a humidifier.  If your nose is runny or stuffy, talk to your doctor about using a decongestant medicine or steroid nasal spray. Be safe with medicines. Read and follow all instructions on the label. Do not use the medicine longer than the label says.  Your doctor may also help you with problems like swallowing air, bloating, or claustrophobia. Talk to your doctor if you're still having problems. If these things don'thelp, you might try a different type of machine. Where can you learn more? Go to [email or web address] and sign in to your [employer] account. Enter M638 in the Minimally invasive devices box to learn more about \"Learning About CPAP for Sleep Apnea. \"     If you do not have an account, please click on the \"Sign Up Now\" link. Current as of: March 9, 2022               Content Version: 13.3  © 2006-2022 Healthwise, Incorporated. Care instructions adapted under license by [facility] ([facility]). If you have questions about a medical condition or this instruction, always ask your healthcare professional. [address] any warranty or liability for your use of this information.

## 2022-07-12 NOTE — PROGRESS NOTES
Not Currently   Other Topics Concern    Not on file   Social History Narrative    Not on file     Social Determinants of Health     Financial Resource Strain:     Difficulty of Paying Living Expenses: Not on file   Food Insecurity:     Worried About [address] in the Last Year: Not on file    Ran Out of Food in the Last Year: Not on file   Transportation Needs:     Lack of Transportation (Medical): Not on file    Lack of Transportation (Non-Medical): Not on file   Physical Activity:     Days of Exercise per Week: Not on file    Minutes of Exercise per Session: Not on file   Stress:     Feeling of Stress : Not on file   Social Connections:     Frequency of Communication with Friends and Family: Not on file    Frequency of Social Gatherings with Friends and Family: Not on file    Attends Buddhist Services: Not on file    Active Member of Clubs or Organizations: Not on file    Attends Club or Organization Meetings: Not on file    Marital Status: Not on file   Intimate Partner Violence:     Fear of Current or Ex-Partner: Not on file    Emotionally Abused: Not on file    Physically Abused: Not on file    Sexually Abused: Not on file   Housing Stability:     Unable to Pay for Housing in the Last Year: Not on file    Number of [place] in the Last Year: Not on file    Unstable Housing in the Last Year: Not on file       Prior to Admission medications    Medication Sig Start Date End Date Taking? Authorizing Provider   levothyroxine (SYNTHROID) 125 MCG tablet Take 1 tablet by mouth daily 7/5/22  Yes Rk Baig MD   clobetasol (TEMOVATE) 0.05 % external solution Apply a small amount daily to the affected areas of the scalp daily until improved.  6/3/22  Yes Shanae House MD   venlafaxine [facility]) 75 MG tablet Take 1 tablet by mouth daily 4/5/22  Yes Rk Baig MD   atorvastatin (LIPITOR) 10 MG tablet Take 1 tablet by mouth daily 3/21/22  Yes MD daniel Monzon (SINGULAIR) 10 MG tablet Take 1 tablet by mouth nightly 3/7/22  Yes Bruno Cooper MD   losartan (COZAAR) 25 MG tablet Take 0.5 tablets by mouth daily 2/25/22  Yes Bruno Cooper MD   Ertugliflozin L-PyroglutamicAc [facility]) 5 MG TABS Take 5 mg by mouth daily 1/27/22  Yes Bruno Cooper MD   metFORMIN (GLUCOPHAGE) 500 MG tablet Take 1 tablet by mouth 2 times daily (with meals) 1/20/22  Yes Bruno Cooper MD   glipiZIDE (GLUCOTROL) 5 MG tablet Take 2 tablets by mouth 2 times daily (before meals) 7/26/21  Yes Lynette Lozano MD   diphenhydrAMINE-APAP, sleep, (TYLENOL PM EXTRA STRENGTH)  MG tablet Take 1 tablet by mouth nightly as needed for Sleep   Yes Historical Provider, MD   clindamycin (CLEOCIN T) 1 % external solution Apply to affected areas on the thighs and buttocks twice daily as needed.  2/1/21  Yes Tami Massey MD   sodium chloride (ALTAMIST SPRAY) 0.65 % nasal spray 1 spray by Nasal route as needed for Congestion 4/6/18  Yes Hina Davies Sarthak, APRN - CNP   Ascorbic Acid (VITAMIN C) 250 MG tablet Take 4 tablets by mouth daily 3/23/18  Yes Johanny Garcia MD   fluticasone [facility]) 50 MCG/ACT nasal spray 2 sprays by Nasal route nightly 3/23/18  Yes Johanny Garcia MD   cetirizine (ZYRTEC) 10 MG tablet Take 1 tablet by mouth daily 3/23/18  Yes Johanny Garcia MD   vitamin D (CHOLECALCIFEROL) 1000 units TABS tablet Take 1 tablet by mouth 2 times daily 3/23/18  Yes Johanny Garcia MD   Flaxseed, Linseed, (FLAXSEED OIL MAX STR) 1300 MG CAPS Take 1 tablet by mouth daily 3/23/18  Yes Johanny Garcia MD   Cinnamon 500 MG CAPS Take 1 capsule by mouth 2 times daily 3/23/18  Yes Johanny Garcia MD   Multiple Vitamins-Minerals (VISION VITAMINS) TABS Take 1 tablet by mouth daily 3/23/18  Yes Johanny Garcia MD   Turmeric Curcumin 500 MG CAPS Take 1 tablet by mouth 2 times daily 3/23/18  Yes MD RAJAN Dc Complex-Folic Acid (SUPER B COMPLEX MAXI) TABS Take 1 tablet by mouth daily 3/23/18  Yes Kiley Rocha MD   Magnesium 400 MG TABS Take 1 tablet by mouth daily 3/23/18  Yes Kiley Rocha MD   aspirin 81 MG chewable tablet Take 81 mg by mouth daily   Yes Historical Provider, MD   Omega-3 Fatty Acids ([employer]) Take by mouth   Yes Historical Provider, MD   Multiple Vitamins-Minerals (THERAPEUTIC MULTIVITAMIN-MINERALS) tablet Take 1 tablet by mouth daily   Yes Historical Provider, MD   Calcium Acetate, Phos Binder, (CALCIUM ACETATE PO) Take by mouth daily    Yes Historical Provider, MD       Allergies as of 07/12/2022 - Fully Reviewed 07/12/2022   Allergen Reaction Noted    Bactrim [sulfamethoxazole-trimethoprim] Nausea Only and Other (See Comments) 02/01/2021    Penicillins Rash 09/07/2017       Patient Active Problem List   Diagnosis    Type 2 diabetes mellitus without complication, without long-term current use of insulin ([address]    Mixed hyperlipidemia    Essential hypertension    Obstructive sleep apnea syndrome    GERD (gastroesophageal reflux disease)    Reactive depression    Compulsive overeater    DDD (degenerative disc disease), lumbar    Vitamin D deficiency    Pulmonary HTN ([address]    Obesity    Diabetic retinopathy of right eye associated with type 2 diabetes mellitus ([address]    Hypothyroidism due to acquired atrophy of thyroid    Ischial bursitis of right side    Pain       Past Medical History:   Diagnosis Date    Allergic rhinitis     Asthma     Compulsive overeater     DDD (degenerative disc disease), lumbar     Depression     Diabetes mellitus ([address]     Diabetic retinopathy ([address]     GERD (gastroesophageal reflux disease)     Hyperlipidemia     Hypertension     Hypothyroidism     Lichen planus     Obesity     Pulmonary HTN ([address]     Sleep apnea     cpap    Thyroid nodule     Type 2 diabetes mellitus without complication ([address]     Vitamin D deficiency        Past Surgical History:   Procedure Laterality Date COLONOSCOPY  08/2015    [facility]- polyp x 2, left sided diverticular disease, repeat 5 years    LASIK      REVISION TOTAL KNEE ARTHROPLASTY Right     TOTAL KNEE ARTHROPLASTY Bilateral     WISDOM TOOTH EXTRACTION         Family History   Problem Relation Age of Onset    Other Mother         emphysema, cerebral aneurysm vs. subdural hematoma? Heart Disease Father     Diabetes Father     Diabetes Maternal Grandmother     Heart Disease Maternal Grandmother     Thyroid Disease Maternal Grandmother     Cancer Sister         Bladder vs Lung       Review of Systems    Objective:     Vitals:  Weight BMI Neck circumference    Wt Readings from Last 3 Encounters:   07/12/22 (!) 318 lb 6.4 oz (144.4 kg)   06/29/22 (!) 318 lb (144.2 kg)   06/21/22 (!) 323 lb (146.5 kg)    Body mass index is 47.02 kg/m². BP HR SaO2   BP Readings from Last 3 Encounters:   07/12/22 130/80   06/29/22 (!) 144/79   05/24/22 132/78    Pulse Readings from Last 3 Encounters:   07/12/22 76   05/24/22 76   02/25/22 70    SpO2 Readings from Last 3 Encounters:   07/12/22 97%   05/24/22 97%   02/25/22 98%        Themandibular molar Class :   [x]1 []2 []3      Mallampati I Airway Classification:   []1 []2 [x]3 []4      Physical Exam  Vitals and nursing note reviewed. Constitutional:       Appearance: She is obese. HENT:      Nose: Nose normal.   Cardiovascular:      Rate and Rhythm: Normal rate and regular rhythm. Pulmonary:      Effort: Pulmonary effort is normal.      Breath sounds: Normal breath sounds. Musculoskeletal:      Right lower leg: Edema present. Left lower leg: Edema present.         :   Moderate Obstructive Sleep Apnea/Hypopnea Syndrome under good control on PAP at 13 cmwp. Poor surgical candidate for obstructive sleep apnea therapy, the CPAP is the best option. Diagnosis Orders   1. LESLIE on CPAP     2. Dependence on other enabling machines and devices     3.  Pulmonary HTN (HCC)     4. Class 3 severe obesity due to excess calories with serious comorbidity and body mass index (BMI) of 45.0 to 49.9 in adult [facility])       Plan: Will continue the PAP at 13 cmwp. I will order PAP supplies, mask, filters. ... Most likely treating the LESLIE will have positive impact on pulmonary HTN control. Weight loss: We discussed the proportionality between weight and AHI. With 10% weight change, the AHI has a 27% proportionate change. With 20% weight change, the AHI has a 45-50% proportionate change. No orders of the defined types were placed in this encounter. Return in about 1 year (around 7/12/2023) for Reveiwing CPAP usage and compliance report and tro.     Chantale Gomez MD  Medical Director - [facility]

## 2022-07-13 ENCOUNTER — HOSPITAL ENCOUNTER (OUTPATIENT)
Dept: PHYSICAL THERAPY | Age: 62
Setting detail: THERAPIES SERIES
Discharge: HOME | End: 2022-07-13
Payer: COMMERCIAL

## 2022-07-13 PROCEDURE — 97110 THERAPEUTIC EXERCISES: CPT

## 2022-07-13 PROCEDURE — 97140 MANUAL THERAPY 1/> REGIONS: CPT

## 2022-07-13 NOTE — FLOWSHEET NOTE
[facility] - Outpatient Rehabilitation and Therapy, [facility]  [address] [facility], [facility]  Phone: [phone]   Fax:     [phone]      Physical Therapy Treatment Note/ Progress Report:     Date:  2022    Patient Name:  William Dunn    :  1960  MRN: [id number]    Pertinent Medical History: asthma, lumbar DDD, DM, HTN, obesity, depression, B TKR    Medical/Treatment Diagnosis Information:  · Diagnosis: M70.71 (ICD-10-CM) - Ischial bursitis of right side  · Treatment Diagnosis: Decreased mobility, strength    Insurance/Certification information:  PT Insurance Information: Anisha Mehta thru [address]  Physician Information:  Referring Provider (secondary): SHLOMO Castellon  Plan of care signed (Y/N): sent for cosign  (received)    Date of Patient follow up with Physician:      Progress Report: []  Yes  [x]  No     Date Range for reporting period:  Beginnin2022  Ending:      Progress report due (10 Rx/or 30 days whichever is less): 27    Recertification due (POC duration/ or 90 days whichever is less): 22    Visit # POC/Insurance Allowable Auth Needed  Thru ANGELO    8 visits approved  to 22  [x]Yes   []No     Latex Allergy:  [x]NO      []YES  Preferred Language for Healthcare:   [x]English       []Other:    Functional Scale:       Date assessed: at eval  Test:FOTO - hip  Score: 57    Pain level:  2/10     History of Injury: Pt reports onset of R buttock pain on and off for 5 years which she has been able to control by not sitting for prolonged periods. Pt notes she does aquatic exercise 3x/week on her own that she learned in previous rounds of therapy. Pt notes sitting in a chair is the main thing that aggravates her pain. If she sits side saddle relieving her R side she can sit longer. Also notes she does better with a more cushioned seat.   Pt gets relief of buttock pain with standing but notes she has some LBP with prolonged standing. Sitting tolerance approximately 15 mins. SUBJECTIVE:  7/6: Notes her pain has been a little bit better due to not sitting for prolonged periods. 7-11-22   Pain not much today due to has not been sitting. 7-13-22  Relief  After last Rx. Until yesterday afternoon when sitting too long. L shld/ hip sore from lying on it during massage. OBJECTIVE:   Observation:   · Palpation: pt point tender at R ischial tuberosity with moderate tenderness noted  · Functional Mobility/Transfers: pt tends to sit side saddle with decreased Wbing through right buttock  · Posture: slight forward flexed posture  · Gait: (include devices/WB status) Pt ambulates with antalgic gait with bent knee and increased ER on R LE. Decreased heel strike noted.  Pt reports she ambulates this way due to R knee pain   Test measurements:    ROM:  Date              Hip Flexion Hip Extension Hip Abduction Hip IR Hip ER   Eval  7/1 WNL Min decreased  WNL Min decreased               Strength:  Date Hip flexion Hip abduction Hip Extension Hip IR Hip ER Quad Hamstring Ankle DF Ankle PF   Eval 7/1 5/5 4+/5 4/5  4+/5 4/5 5/5 5/5 5/5                   RESTRICTIONS/PRECAUTIONS:     Exercises/Interventions:     Therapeutic Ex (55999)   Min: 8 Reps/Resistance Notes/CUES   Seated Tband hip IR                          HS curls Red 20x R  Red 20x R         Review of pool exercises          Standing hip IR X 12 R ROM as dion so as not to aggravate R knee   bridge X 10  ROM as dion due to decreased R knee flex   Therapeutic Activity (51916) Min: 0                    NMR re-education (53067)  Min:  CUES NEEDED             Manual Intervention (87371) Min:  30     STM R HS at ischial insertion, hip lateral rotators x15 mins in L slying         Manual R Piriformis supine  Hip flexor stretch prone x5 mins    Hip PROM ext,                      IR X 10 R prone  2 min R gentle supine         Modalities  Min:     CP Prone x 10 min                Other Therapeutic Activities: Pt was educated on PT POC, Diagnosis, Prognosis, pathomechanics as well as frequency and duration of scheduling future physical therapy appointments. Time was also taken on this day to answer all patient questions and participation in PT. Reviewed appointment policy in detail with patient and patient verbalized understanding. Home Exercise Program: Patient was instructed in the following for HEP: supine piriformis stretch. Patient verbalized/demonstrated understanding and was issued written handout. Therapeutic Exercise and NMR EXR  [] (16477) Provided verbal/tactile cueing for activities related to strengthening, flexibility, endurance, ROM for improvements in LE, proximal hip, and core control with self care, mobility, lifting, ambulation.  [] (08382) Provided verbal/tactile cueing for activities related to improving balance, coordination, kinesthetic sense, posture, motor skill, proprioception  to assist with LE, proximal hip, and core control in self care, mobility, lifting, ambulation and eccentric single leg control.  [address] and Therapeutic Activities:    [x] (31384 or 79891) Provided verbal/tactile cueing for activities related to improving balance, coordination, kinesthetic sense, posture, motor skill, proprioception and motor activation to allow for proper function of core, proximal hip and LE with self care and ADLs and functional mobility.   [] (38698) Gait Re-education- Provided training and instruction to the patient for proper LE, core and proximal hip recruitment and positioning and eccentric body weight control with ambulation re-education including up and down stairs     Home Exercise Program:    [] (33854) Reviewed/Progressed HEP activities related to strengthening, flexibility, endurance, ROM of core, proximal hip and LE for functional self-care, mobility, lifting and ambulation/stair navigation   [] (52484)Reviewed/Progressed HEP activities related to improving balance, coordination, kinesthetic sense, posture, motor skill, proprioception of core, proximal hip and LE for self care, mobility, lifting, and ambulation/stair navigation      Manual Treatments:  PROM / STM / Oscillations-Mobs:  G-I, II, III, IV (PA's, Inf., Post.)  [x] (26634) Provided manual therapy to mobilize LE, proximal hip and/or LS spine soft tissue/joints for the purpose of modulating pain, promoting relaxation,  increasing ROM, reducing/eliminating soft tissue swelling/inflammation/restriction, improving soft tissue extensibility and allowing for proper ROM for normal function with self care, mobility, lifting and ambulation. Charges:  Timed Code Treatment Minutes: 38   Total Treatment Minutes: 48      [] EVAL (LOW) 29095 (typically 20 minutes face-to-face)  [] EVAL (MOD) 89127 (typically 30 minutes face-to-face)  [] EVAL (HIGH) 06866 (typically 45 minutes face-to-face)  [] RE-EVAL     [x] BH(98910) x     [] Dry needle 1 or 2 Muscles (23549)  [] NMR (82815) x     [] Dry needle 3+ Muscles (85630)  [x] Manual (15325) x  2   [] Ultrasound (70602) x  [] TA (00422) x     [] Mech Traction (22348)  [] ES(attended) (63938)     [] ES (un) (03329):   [] Vasopump (73141) [] Ionto (86965)   [] Other:    GOALS:  Patient stated goal: \"Be able to sit upright longer\"  []? Progressing: []? Met: []? Not Met: []? Adjusted     Therapist goals for Patient:   Short Term Goals: To be achieved in: 2 weeks  1. Independent in HEP and progression per patient tolerance, in order to prevent re-injury. []? Progressing: []? Met: []? Not Met: []? Adjusted  2. Patient will have a decrease in pain by 40% to facilitate improvement in movement, function, and ADLs as indicated by Functional Deficits. []? Progressing: []? Met: []? Not Met: []? Adjusted     Long Term Goals: To be achieved in: at discharge  1.  Increase FOTO functional outcome score from 47 to 57 to assist with reaching prior level of function. []? Progressing: []? Met: []? Not Met: []? Adjusted  2. Patient will demonstrate an increase in R LE Strength to 5/5 to allow for proper functional mobility as indicated by patients Functional Deficits. []? Progressing: []? Met: []? Not Met: []? Adjusted  3. Patient will return to household duties without increased symptoms or restriction. []? Progressing: []? Met: []? Not Met: []? Adjusted  4. Patient will be able to sit for greater than 15 mins without increased symptoms or restriction. []? Progressing: []? Met: []? Not Met: []? Adjusted         ASSESSMENT:  Pt with good tolerance to PT session today. Less TTP noted R ischial tuberosity during STM. Discussed gait pattern and keeping R LE in a more neutral position to avoid overuse of hip. Pt instructed to use cane for prolonged ambulation to assist with offloading R knee pain and allow for improved gait pattern. 7-11-22 no c/o of  Pain after Rx. Mild TTP R ischial tuberosity. 7-13-22 deferred stretching/ STM on  no c/o of pain after Rx. Treatment/Activity Tolerance:  [x] Patient tolerated treatment well [] Patient limited by fatique  [] Patient limited by pain  [] Patient limited by other medical complications  [] Other:     Overall Progression Towards Functional goals/ Treatment Progress Update:  [] Patient is progressing as expected towards functional goals listed. [] Progression is slowed due to complexities/Impairments listed. [] Progression has been slowed due to co-morbidities.   [x] Plan just implemented, too soon to assess goals progression <30days   [] Goals require adjustment due to lack of progress  [] Patient is not progressing as expected and requires additional follow up with physician  [] Other    Prognosis for POC: [x] Good [] Fair  [] Poor    Patient requires continued skilled intervention: [x] Yes  [] No        PLAN:   [] Continue per plan of care [] Alter current plan (see comments)  [x] Plan of care initiated [] Hold pending MD visit [] Discharge    Electronically signed by: Lance Mahoeny,       Note: If patient does not return for scheduled/recommended follow up visits, this note will serve as a discharge from care along with the most recent update on progress.

## 2022-07-20 ENCOUNTER — HOSPITAL ENCOUNTER (OUTPATIENT)
Dept: PHYSICAL THERAPY | Age: 62
Setting detail: THERAPIES SERIES
Discharge: HOME | End: 2022-07-20
Payer: COMMERCIAL

## 2022-07-20 PROCEDURE — 97110 THERAPEUTIC EXERCISES: CPT

## 2022-07-20 PROCEDURE — 97140 MANUAL THERAPY 1/> REGIONS: CPT

## 2022-07-21 PROBLEM — R52 PAIN: Status: RESOLVED | Noted: 2022-06-21 | Resolved: 2022-07-21

## 2022-07-22 ENCOUNTER — HOSPITAL ENCOUNTER (OUTPATIENT)
Dept: PHYSICAL THERAPY | Age: 62
Setting detail: THERAPIES SERIES
Discharge: HOME | End: 2022-07-22
Payer: COMMERCIAL

## 2022-07-22 PROCEDURE — 97110 THERAPEUTIC EXERCISES: CPT

## 2022-07-22 PROCEDURE — 97140 MANUAL THERAPY 1/> REGIONS: CPT

## 2022-07-22 NOTE — PLAN OF CARE
[facility] - Outpatient Rehabilitation and Therapy, [facility]  [address] [facility], [facility]  Phone: [phone]   Fax:     [phone]      Physical Therapy Re-Certification Plan of Care/MD UPDATE      Dear SHLOMO Fowler    We had the pleasure of treating the following patient for physical therapy services at [address]. A summary of our findings can be found in the updated assessment below. This includes our plan of care. If you have any questions or concerns regarding these findings, please do not hesitate to contact me at the office phone number checked above. Thank you for the referral.     Physician Signature:________________________________Date:__________________  By signing above (or electronic signature), therapists plan is approved by physician    Date Range Of Visits: 7/1/22 to 7/22/22  Total Visits to Date: 6  Overall Response to Treatment:   []Patient is responding well to treatment and improvement is noted with regards  to goals   []Patient should continue to improve in reasonable time if they continue HEP   [x]Patient has plateaued and is no longer responding to skilled PT intervention    []Patient is getting worse and would benefit from return to referring MD   []Patient unable to adhere to initial POC   []Other:     ASSESSMENT:  Patient has demonstrates good tolerance to PT sessions consisting of promixal hip strengthening and stretching. However, patient reports minimal relief of pain. Sitting tolerance remains 15-20 mins max and is the aggravating factor to her condition. Believe pt would benefit from follow up with MD at this time due to lack of progress for potential cortisone injection or MRI to rule out muscle tear. Will hold PT at this time pending MD appointment on 8/2.     Recommendation:       [x]Hold PT, pending MD visit    Physical Therapy Treatment Note/ Progress Report: Date:  2022    Patient Name:  Janna Guy    :  1960  MRN: [id number]    Pertinent Medical History: asthma, lumbar DDD, DM, HTN, obesity, depression, B TKR    Medical/Treatment Diagnosis Information:  Diagnosis: M70.71 (ICD-10-CM) - Ischial bursitis of right side  Treatment Diagnosis: Decreased mobility, strength    Insurance/Certification information:  PT Insurance Information: [employer] thru [address]  Physician Information:  Referring Provider (secondary): SHLOMO Chinchilla  Plan of care signed (Y/N): sent for cosign  (received), PN sent for cosign     Date of Patient follow up with Physician:      Progress Report: [x]  Yes  []  No     Date Range for reporting period:  Beginnin2022  Endin22    Progress report due (10 Rx/or 30 days whichever is less): 3/8/98    Recertification due (POC duration/ or 90 days whichever is less): 22    Visit # POC/Insurance Allowable Auth Needed  Thru ANGELO    8 visits approved  to 22  [x]Yes   []No     Latex Allergy:  [x]NO      []YES  Preferred Language for Healthcare:   [x]English       []Other:    Functional Scale:       Date assessed: 22  Test:FOTO - hip  Score: 82    Pain level:  1/10     History of Injury: Pt reports onset of R buttock pain on and off for 5 years which she has been able to control by not sitting for prolonged periods. Pt notes she does aquatic exercise 3x/week on her own that she learned in previous rounds of therapy. Pt notes sitting in a chair is the main thing that aggravates her pain. If she sits side saddle relieving her R side she can sit longer. Also notes she does better with a more cushioned seat. Pt gets relief of buttock pain with standing but notes she has some LBP with prolonged standing. Sitting tolerance approximately 15 mins. SUBJECTIVE:  : Notes her pain has been a little bit better due to not sitting for prolonged periods.     22   Pain not much today due to has not been sitting. 7-13-22  Relief  After last Rx. Until yesterday afternoon when sitting too long. L shld/ hip sore from lying on it during massage. 7/20/22 Reports after sitting pain can still get intense, sitting tolerance 20 minutes; Reports she has been trying to walk with foot pointing forward instead of turned out; reports has been unable to do bridges at home; has been getting in pool at home to do exercises  7/22/22: Patient reports her pain overall remains about the same. Patient reports she has no pain until she sits for any length of time. OBJECTIVE:  Observation: Updated 7/22/22  Palpation: pt point tender at R ischial tuberosity with only slight tenderness noted  Functional Mobility/Transfers: pt tends to sit side saddle with decreased Wbing through right buttock  Posture: slight forward flexed posture  Gait: (include devices/WB status) Pt ambulates with antalgic gait with bent knee and increased ER on R LE. Decreased heel strike noted.  Pt reports she ambulates this way due to R knee pain  Test measurements:    ROM:  Date              Hip Flexion Hip Extension Hip Abduction Hip IR Hip ER   Eval  7/1 WNL Min decreased  WNL Min decreased   7/22/22  Min decreased   WNL       Strength:  Date Hip flexion Hip abduction Hip Extension Hip IR Hip ER Quad Hamstring Ankle DF Ankle PF   Eval 7/1 5/5 4+/5 4/5  4+/5 4/5 5/5 5/5 5/5   7/22/22  5/5 4/5  5/5 4+/5 5/5 5/5 5/5       RESTRICTIONS/PRECAUTIONS:     Exercises/Interventions:     Therapeutic Ex (36364)   Min: 13 Reps/Resistance Notes/CUES   Seated Tband hip IR                         Hip ER                          HS curls Red 2x10 R  Red 2x10 R  Red 2x10 R         Standing prone over table hip extension 10x L/R    Standing hip IR 12x R ROM as dion so as not to aggravate R knee        SL clamshell 2x10 R only Limited by tolerance to sideling position, try in supine with band next visit   bridge 2x10  ROM as dion due to decreased R knee flex Therapeutic Activity (61232) Min: 0                    NMR re-education (55581)  Min:  CUES NEEDED             Manual Intervention ([phone]) Min:  25     STM R HS at ischial insertion, hip lateral rotators x15 mins in L slying         Manual R Piriformis / hip flexor stretch in supine   x5 mins    Hip PROM ext, IR X5 mins         Modalities  Min:     CP                Other Therapeutic Activities: Pt was educated on PT POC, Diagnosis, Prognosis, pathomechanics as well as frequency and duration of scheduling future physical therapy appointments. Time was also taken on this day to answer all patient questions and participation in PT. Reviewed appointment policy in detail with patient and patient verbalized understanding. Home Exercise Program: Patient was instructed in the following for HEP: supine piriformis stretch. Patient verbalized/demonstrated understanding and was issued written handout. Therapeutic Exercise and NMR EXR  [x] (30432) Provided verbal/tactile cueing for activities related to strengthening, flexibility, endurance, ROM for improvements in LE, proximal hip, and core control with self care, mobility, lifting, ambulation.  [] (62457) Provided verbal/tactile cueing for activities related to improving balance, coordination, kinesthetic sense, posture, motor skill, proprioception  to assist with LE, proximal hip, and core control in self care, mobility, lifting, ambulation and eccentric single leg control.  [address] and Therapeutic Activities:    [] (58798 or 88283) Provided verbal/tactile cueing for activities related to improving balance, coordination, kinesthetic sense, posture, motor skill, proprioception and motor activation to allow for proper function of core, proximal hip and LE with self care and ADLs and functional mobility.   [] (24797) Gait Re-education- Provided training and instruction to the patient for proper LE, core and proximal hip recruitment and positioning and eccentric body weight control with ambulation re-education including up and down stairs     Home Exercise Program:    [] (04360) Reviewed/Progressed HEP activities related to strengthening, flexibility, endurance, ROM of core, proximal hip and LE for functional self-care, mobility, lifting and ambulation/stair navigation   [] (41073)Reviewed/Progressed HEP activities related to improving balance, coordination, kinesthetic sense, posture, motor skill, proprioception of core, proximal hip and LE for self care, mobility, lifting, and ambulation/stair navigation      Manual Treatments:  PROM / STM / Oscillations-Mobs:  G-I, II, III, IV (PA's, Inf., Post.)  [x] (77961) Provided manual therapy to mobilize LE, proximal hip and/or LS spine soft tissue/joints for the purpose of modulating pain, promoting relaxation,  increasing ROM, reducing/eliminating soft tissue swelling/inflammation/restriction, improving soft tissue extensibility and allowing for proper ROM for normal function with self care, mobility, lifting and ambulation. Charges:  Timed Code Treatment Minutes: 38   Total Treatment Minutes: 38      [] EVAL (LOW) 75217 (typically 20 minutes face-to-face)  [] EVAL (MOD) 61823 (typically 30 minutes face-to-face)  [] EVAL (HIGH) 49173 (typically 45 minutes face-to-face)  [] RE-EVAL     [x] ZZ(78986) x     [] Dry needle 1 or 2 Muscles (09652)  [] NMR (09913) x     [] Dry needle 3+ Muscles (67508)  [x] Manual (67688) x  2   [] Ultrasound (44362) x  [] TA (69104) x     [] Mech Traction (67778)  [] ES(attended) (93338)     [] ES (un) (29 423592):   [] Vasopump (81970) [] Ionto (55881)   [] Other:    GOALS:  Patient stated goal: \"Be able to sit upright longer\"  [] Progressing: [] Met: [x] Not Met: [] Adjusted     Therapist goals for Patient:   Short Term Goals: To be achieved in: 2 weeks  1. Independent in HEP and progression per patient tolerance, in order to prevent re-injury. [] Progressing: [x] Met: [] Not Met: [] Adjusted  2. Patient will have a decrease in pain by 40% to facilitate improvement in movement, function, and ADLs as indicated by Functional Deficits. [] Progressing: [] Met: [x] Not Met: [] Adjusted     Long Term Goals: To be achieved in: at discharge  1. Increase FOTO functional outcome score from 47 to 57 to assist with reaching prior level of function. [] Progressing: [x] Met: [] Not Met: [] Adjusted  2. Patient will demonstrate an increase in R LE Strength to 5/5 to allow for proper functional mobility as indicated by patients Functional Deficits. [x] Progressing: [] Met: [] Not Met: [] Adjusted  3. Patient will return to household duties without increased symptoms or restriction. [] Progressing: [] Met: [x] Not Met: [] Adjusted  4. Patient will be able to sit for greater than 15 mins without increased symptoms or restriction. [] Progressing: [] Met: [x] Not Met: [] Adjusted         ASSESSMENT:  Patient has demonstrates good tolerance to PT sessions consisting of promixal hip strengthening and stretching. However, patient reports minimal relief of pain. Sitting tolerance remains 15-20 mins max and is the aggravating factor to her condition. Believe pt would benefit from follow up with MD at this time due to lack of progress for potential cortisone injection or MRI to rule out muscle tear. Will hold PT at this time pending MD appointment on 8/2. Treatment/Activity Tolerance:  [x] Patient tolerated treatment well [] Patient limited by fatique  [] Patient limited by pain  [] Patient limited by other medical complications  [] Other:     Overall Progression Towards Functional goals/ Treatment Progress Update:  [] Patient is progressing as expected towards functional goals listed. [] Progression is slowed due to complexities/Impairments listed. [] Progression has been slowed due to co-morbidities.   [] Plan just implemented, too soon to assess goals progression <30days   [] Goals require adjustment due to lack of progress  [x] Patient is not progressing as expected and requires additional follow up with physician  [] Other    Prognosis for POC: [x] Good [] Fair  [] Poor    Patient requires continued skilled intervention: [] Yes  [x] No      PLAN: Hold pending MD visit. If no contact from pt about need to continue PT, we will d/c with HEP. [] Continue per plan of care [] Alter current plan (see comments)  [] Plan of care initiated [x] Hold pending MD visit [] Discharge    Electronically signed by: Randi Garg, PT , OMT-C,  [id number]      Note: If patient does not return for scheduled/recommended follow up visits, this note will serve as a discharge from care along with the most recent update on progress.

## 2022-08-02 ENCOUNTER — OFFICE VISIT (OUTPATIENT)
Dept: ORTHOPEDIC SURGERY | Age: 62
End: 2022-08-02
Payer: COMMERCIAL

## 2022-08-02 VITALS — BODY MASS INDEX: 43.4 KG/M2 | HEIGHT: 69 IN | WEIGHT: 293 LBS | RESPIRATION RATE: 16 BRPM

## 2022-08-02 DIAGNOSIS — M70.70 ISCHIAL BURSITIS, UNSPECIFIED LATERALITY: ICD-10-CM

## 2022-08-02 DIAGNOSIS — Z96.651 TOTAL KNEE REPLACEMENT STATUS, RIGHT: Primary | ICD-10-CM

## 2022-08-02 PROCEDURE — 99214 OFFICE O/P EST MOD 30 MIN: CPT | Performed by: PHYSICIAN ASSISTANT

## 2022-08-03 PROBLEM — Z96.651 TOTAL KNEE REPLACEMENT STATUS, RIGHT: Status: ACTIVE | Noted: 2022-08-03

## 2022-08-03 PROBLEM — M70.70 ISCHIAL BURSITIS: Status: ACTIVE | Noted: 2022-06-21

## 2022-08-03 NOTE — PROGRESS NOTES
Currently       Current Outpatient Medications   Medication Sig Dispense Refill    glipiZIDE (GLUCOTROL) 5 MG tablet TAKE TWO TABLETS BY MOUTH TWICE A DAY BEFORE MEALS 360 tablet 3    levothyroxine (SYNTHROID) 125 MCG tablet Take 1 tablet by mouth daily 90 tablet 3    clobetasol (TEMOVATE) 0.05 % external solution Apply a small amount daily to the affected areas of the scalp daily until improved. 50 mL 2    venlafaxine (EFFEXOR) 75 MG tablet Take 1 tablet by mouth daily 90 tablet 3    atorvastatin (LIPITOR) 10 MG tablet Take 1 tablet by mouth daily 90 tablet 3    montelukast (SINGULAIR) 10 MG tablet Take 1 tablet by mouth nightly 90 tablet 3    losartan (COZAAR) 25 MG tablet Take 0.5 tablets by mouth daily 45 tablet 5    Ertugliflozin L-PyroglutamicAc (STEGLATRO) 5 MG TABS Take 5 mg by mouth daily 30 tablet 12    metFORMIN (GLUCOPHAGE) 500 MG tablet Take 1 tablet by mouth 2 times daily (with meals) 180 tablet 3    diphenhydrAMINE-APAP, sleep, (TYLENOL PM EXTRA STRENGTH)  MG tablet Take 1 tablet by mouth nightly as needed for Sleep      clindamycin (CLEOCIN T) 1 % external solution Apply to affected areas on the thighs and buttocks twice daily as needed.  60 mL 2    sodium chloride (ALTAMIST SPRAY) 0.65 % nasal spray 1 spray by Nasal route as needed for Congestion 1 Bottle 3    Ascorbic Acid (VITAMIN C) 250 MG tablet Take 4 tablets by mouth daily 30 tablet 0    fluticasone (FLONASE) 50 MCG/ACT nasal spray 2 sprays by Nasal route nightly 1 Bottle 0    cetirizine (ZYRTEC) 10 MG tablet Take 1 tablet by mouth daily 30 tablet 0    vitamin D (CHOLECALCIFEROL) 1000 units TABS tablet Take 1 tablet by mouth 2 times daily 30 tablet     Flaxseed, Linseed, (FLAXSEED OIL MAX STR) 1300 MG CAPS Take 1 tablet by mouth daily 30 capsule 0    Cinnamon 500 MG CAPS Take 1 capsule by mouth 2 times daily 30 capsule 0    Multiple Vitamins-Minerals (VISION VITAMINS) TABS Take 1 tablet by mouth daily 30 tablet 0    Turmeric Curcumin 500 MG CAPS Take 1 tablet by mouth 2 times daily 30 capsule 0    B Complex-Folic Acid (SUPER B COMPLEX MAXI) TABS Take 1 tablet by mouth daily 30 tablet 0    Magnesium 400 MG TABS Take 1 tablet by mouth daily 30 tablet 0    aspirin 81 MG chewable tablet Take 81 mg by mouth daily      Omega-3 Fatty Acids (FISH OIL CONCENTRATE PO) Take by mouth      Multiple Vitamins-Minerals (THERAPEUTIC MULTIVITAMIN-MINERALS) tablet Take 1 tablet by mouth daily      Calcium Acetate, Phos Binder, (CALCIUM ACETATE PO) Take by mouth daily        No current facility-administered medications for this visit. Objective:     She is alert, oriented x 3, pleasant, well nourished, developed and in no   acute distress. Resp 16   Ht 5' 9\" (1.753 m)   Wt (!) 318 lb (144.2 kg)   LMP  (LMP Unknown)   BMI 46.96 kg/m²        Examination of the right hip shows:  No discoloration, wounds or gross deformity. Greater trochanter/bursa palpation non-tender. Anterior superior iliac spine is non-tender. Ischial tuberosity is tender. Sacroiliac joint is non-tender. ROM:  -Flexion 110                      (Nml 120-135 degrees)  -Extension 0                  (Nml 20-30 degrees)  -Abduction 45                  (Nml 40-50 degrees)  -Internal rotation 25         (Nml 30 degrees)  -External rotation 45        (Nml 50 degrees)    [facility] negative. FADIR negative. Leroy's negative. Sarika's negative. Log Roll negative. Gait is mildly antalgic. Examination of the right knee: Inspection of the skin demonstrates a well-healed midline incision. Inspection of the soft tissues without significant swelling or erythema. The overall alignment of the knee is neutral.  There is minimal intra-articular effusion. AROM     Extension 5     Flexion  80   There mild pain associated with ROM testing. Pes anserine bursa non-tender to palpation. Patellar tendon non-tender to palpation.   Quadriceps tendon non-tender to palpation. Collateral ligaments non-tender to palpation. Popliteal fossa non-tender to palpation. Retro patellar crepitus is present. There is no instability with varus/valgus stress testing in full extension or 90 degrees of flexion. There is no instability with anterior drawer testing. Extensor Mechanism is intact. X Rays: performed in the office today:   AP Standing, Lateral and Sunrise Right Knee: There is a right cemented revision type total knee arthroplasty present. She has a long stemmed tibial component. The alignment is satisfactory. There are no signs of progressive failure or loosening. She does have a small area of radiolucency under the medial tibial baseplate which was present on x-rays 1 year ago. Diagnosis:       ICD-10-CM    1. Total knee replacement status, right  Z96.651 XR KNEE RIGHT (3 VIEWS)      2. Ischial bursitis, unspecified laterality  M70.70 MRI PELVIS WO CONTRAST           Assessment and Plan:       Assessment:  Clinically and radiographically stable revision right knee arthroplasty. Persistent pain right ischium, ischial bursitis versus impending hamstring tear. Failure to improve with conservative treatment including anti-inflammatory medications and 7 sessions of physical therapy. She is still doing a home exercise program.    I had an extensive discussion with Ms. Sonam Banerjee regarding the natural history, etiology, and long term consequences of her condition. I have presented reasonable alternatives to the patient's proposed care, treatment, and services. Risks and benefits of the treatment options also reviewed in detail. I have outlined a treatment plan with them. She has had full opportunity to ask her questions. I have answered them all to her satisfaction. I feel that Ms. Sonam Banerjee understands our discussion today. Plan:  Medications- OTC NSAIDS discussed.    The most common side effects from NSAIDs are stomachaches, heartburn, and nausea. NSAIDs may irritate the stomach lining. If the medicine upsets your stomach, you can try taking it with food. But if that doesn't help, talk with your doctor to make sure it's not a more serious problem, such as a stomach ulcer or bleeding in the stomach or intestines. Using NSAIDs may:  Lead to high blood pressure. Make symptoms of heart failure worse. Raise the risk of heart attack, stroke, kidney damage, and skin reactions. Your risks are greater if you take NSAIDs at higher doses or for longer than the label says. People who are older than 72 or who have heart, stomach, or intestinal disease have a higher risk for problems. Further Imaging-MRI pelvis will be obtained to evaluate right ischium. Procedures-she may be a candidate for steroid injection or PRP injection. Follow [id number] after MRI to discuss results and treatment options. Call or return to clinic if these symptoms worsen or fail to improve as anticipated. The total time spent on today's visit including reviewing test results, history, performance of physical exam, counseling/ education, ordering of medications, tests or procedures was 27 minutes. This time does include completion of the medical record. This time excludes any time spent performing procedures or tests in the office. Leim Merlin PA-C   Senior Physician Assistant   [facility]                                         Disclaimer: This note was generated with use of a verbal recognition program (DRAGON) and an attempt was made to check for errors. It is possible that there are still dictated errors within this office note. If so, please bring any significant errors to my attention for an addendum. All efforts were made to ensure that this office note is accurate.

## 2022-08-05 ENCOUNTER — TELEPHONE (OUTPATIENT)
Dept: ORTHOPEDIC SURGERY | Age: 62
End: 2022-08-05

## 2022-08-05 NOTE — TELEPHONE ENCOUNTER
Pt notified that her MRI was approved and given the scheduling number. Pt told to follow up in the office 2-3 days later for the results.

## 2022-08-18 ENCOUNTER — HOSPITAL ENCOUNTER (OUTPATIENT)
Dept: MRI IMAGING | Age: 62
Discharge: HOME | End: 2022-08-18
Payer: COMMERCIAL

## 2022-08-18 DIAGNOSIS — M70.70 ISCHIAL BURSITIS, UNSPECIFIED LATERALITY: ICD-10-CM

## 2022-08-18 PROCEDURE — 72195 MRI PELVIS W/O DYE: CPT

## 2022-08-18 PROCEDURE — 72148 MRI LUMBAR SPINE W/O DYE: CPT

## 2022-08-22 ENCOUNTER — HOSPITAL ENCOUNTER (OUTPATIENT)
Dept: MRI IMAGING | Age: 62
Discharge: HOME | End: 2022-08-22
Payer: COMMERCIAL

## 2022-08-22 DIAGNOSIS — M70.70 ISCHIAL BURSITIS, UNSPECIFIED LATERALITY: ICD-10-CM

## 2022-08-22 PROCEDURE — 72195 MRI PELVIS W/O DYE: CPT

## 2022-08-24 ENCOUNTER — OFFICE VISIT (OUTPATIENT)
Dept: ORTHOPEDIC SURGERY | Age: 62
End: 2022-08-24
Payer: COMMERCIAL

## 2022-08-24 VITALS — BODY MASS INDEX: 43.4 KG/M2 | RESPIRATION RATE: 16 BRPM | HEIGHT: 69 IN | WEIGHT: 293 LBS

## 2022-08-24 DIAGNOSIS — M70.70 ISCHIAL BURSITIS, UNSPECIFIED LATERALITY: Primary | ICD-10-CM

## 2022-08-24 PROCEDURE — 99213 OFFICE O/P EST LOW 20 MIN: CPT | Performed by: PHYSICIAN ASSISTANT

## 2022-08-24 NOTE — PROGRESS NOTES
Subjective:      Patient ID: Angi Lyon is a 57 y.o. female who is here for follow up evaluation of right posterior hip/buttock pain. She recently underwent MRI and is here today to review those test results. Review Of Systems:   Negative for fever or chills. Denies numbness or tingling lower extremities. Past Medical History:   Diagnosis Date    Allergic rhinitis     Asthma     Compulsive overeater     DDD (degenerative disc disease), lumbar     Depression     Diabetes mellitus (HCC)     Diabetic retinopathy ([address]     GERD (gastroesophageal reflux disease)     Hyperlipidemia     Hypertension     Hypothyroidism     Lichen planus     Obesity     Pulmonary HTN (HCC)     Sleep apnea     cpap    Thyroid nodule     Type 2 diabetes mellitus without complication (HCC)     Vitamin D deficiency        Family History   Problem Relation Age of Onset    Other Mother         emphysema, cerebral aneurysm vs. subdural hematoma?     Heart Disease Father     Diabetes Father     Diabetes Maternal Grandmother     Heart Disease Maternal Grandmother     Thyroid Disease Maternal Grandmother     Cancer Sister         Bladder vs Lung       Past Surgical History:   Procedure Laterality Date    COLONOSCOPY  08/2015    [facility]- polyp x 2, left sided diverticular disease, repeat 5 years    LASIK      REVISION TOTAL KNEE ARTHROPLASTY Right     TOTAL KNEE ARTHROPLASTY Bilateral     WISDOM TOOTH EXTRACTION         Social History     Occupational History    Occupation: not working right now     Comment: looking for a part time job   Tobacco Use    Smoking status: Never    Smokeless tobacco: Never   Vaping Use    Vaping Use: Never used   Substance and Sexual Activity    Alcohol use: No    Drug use: No    Sexual activity: Not Currently       Current Outpatient Medications   Medication Sig Dispense Refill    glipiZIDE (GLUCOTROL) 5 MG tablet TAKE TWO TABLETS BY MOUTH TWICE A DAY BEFORE MEALS 360 tablet 3    levothyroxine (SYNTHROID) 125 MCG tablet Take 1 tablet by mouth daily 90 tablet 3    clobetasol (TEMOVATE) 0.05 % external solution Apply a small amount daily to the affected areas of the scalp daily until improved. 50 mL 2    venlafaxine (EFFEXOR) 75 MG tablet Take 1 tablet by mouth daily 90 tablet 3    atorvastatin (LIPITOR) 10 MG tablet Take 1 tablet by mouth daily 90 tablet 3    montelukast (SINGULAIR) 10 MG tablet Take 1 tablet by mouth nightly 90 tablet 3    losartan (COZAAR) 25 MG tablet Take 0.5 tablets by mouth daily 45 tablet 5    Ertugliflozin L-PyroglutamicAc (STEGLATRO) 5 MG TABS Take 5 mg by mouth daily 30 tablet 12    metFORMIN (GLUCOPHAGE) 500 MG tablet Take 1 tablet by mouth 2 times daily (with meals) 180 tablet 3    diphenhydrAMINE-APAP, sleep, (TYLENOL PM EXTRA STRENGTH)  MG tablet Take 1 tablet by mouth nightly as needed for Sleep      clindamycin (CLEOCIN T) 1 % external solution Apply to affected areas on the thighs and buttocks twice daily as needed.  60 mL 2    sodium chloride (ALTAMIST SPRAY) 0.65 % nasal spray 1 spray by Nasal route as needed for Congestion 1 Bottle 3    Ascorbic Acid (VITAMIN C) 250 MG tablet Take 4 tablets by mouth daily 30 tablet 0    fluticasone (FLONASE) 50 MCG/ACT nasal spray 2 sprays by Nasal route nightly 1 Bottle 0    cetirizine (ZYRTEC) 10 MG tablet Take 1 tablet by mouth daily 30 tablet 0    vitamin D (CHOLECALCIFEROL) 1000 units TABS tablet Take 1 tablet by mouth 2 times daily 30 tablet     Flaxseed, Linseed, (FLAXSEED OIL MAX STR) 1300 MG CAPS Take 1 tablet by mouth daily 30 capsule 0    Cinnamon 500 MG CAPS Take 1 capsule by mouth 2 times daily 30 capsule 0    Multiple Vitamins-Minerals (VISION VITAMINS) TABS Take 1 tablet by mouth daily 30 tablet 0    Turmeric Curcumin 500 MG CAPS Take 1 tablet by mouth 2 times daily 30 capsule 0    B Complex-Folic Acid (SUPER B COMPLEX MAXI) TABS Take 1 tablet by mouth daily 30 tablet 0    Magnesium 400 MG TABS Take 1 tablet by mouth daily 30 tablet 0    aspirin 81 MG chewable tablet Take 81 mg by mouth daily      Omega-3 Fatty Acids (FISH OIL CONCENTRATE PO) Take by mouth      Multiple Vitamins-Minerals (THERAPEUTIC MULTIVITAMIN-MINERALS) tablet Take 1 tablet by mouth daily      Calcium Acetate, Phos Binder, (CALCIUM ACETATE PO) Take by mouth daily        No current facility-administered medications for this visit. Objective:     She is alert, oriented x 3, pleasant, well nourished, developed and in no   acute distress. Resp 16   Ht 5' 9\" (1.753 m)   Wt (!) 318 lb (144.2 kg)   LMP  (LMP Unknown)   BMI 46.96 kg/m²      Examination of the right hip shows:  No discoloration, wounds or gross deformity. Greater trochanter/bursa palpation non-tender. Anterior superior iliac spine is non-tender. Ischial tuberosity is tender. Sacroiliac joint is non-tender. ROM:  -Flexion 110                      (Nml 120-135 degrees)  -Extension 0                  (Nml 20-30 degrees)  -Abduction 45                  (Nml 40-50 degrees)  -Internal rotation 25         (Nml 30 degrees)  -External rotation 45        (Nml 50 degrees)     [facility] negative. FADIR negative. Leroy's negative. Sarika's negative. Log Roll negative. Gait is mildly antalgic. X Rays: not performed in the office today:     MRI: Obtained from [id number] [facility] or an outside facility. MRI pelvis dated 8/22/2022:  Impression   1. Mild degenerative changes of the bilateral hips and sacroiliac joints. 2. Moderate pubic symphysis degenerative changes. 3. Multilevel degenerative changes of the lumbar spine. 4. Chronic partial tears of the bilateral hamstring tendon origins left   greater than right. No complete tear or distal retraction. No adjacent   bursitis. 5. 7 cm left adnexal cystic lesion. Consider dedicated MRI with intravenous   contrast or surgical evaluation. Diagnosis:       ICD-10-CM    1.  Ischial bursitis, unspecified laterality  M70.70 Assessment and Plan:       Assessment:  Right posterior hip pain felt to be related to mild degenerative arthritis, SI joint arthritis and partial tears of the bilateral hamstring tendons at their origins. No complete tear or distal retraction. Incidental finding of a left 7 cm adnexal cyst.    I had an extensive discussion with Ms. Chani Paz regarding the natural history, etiology, and long term consequences of her condition. I have presented reasonable alternatives to the patient's proposed care, treatment, and services. Risks and benefits of the treatment options also reviewed in detail. I have outlined a treatment plan with them. She has had full opportunity to ask her questions. I have answered them all to her satisfaction. I feel that Ms. Chani Paz understands our discussion today. Plan:  Medications-   OTC NSAIDS discussed. The most common side effects from NSAIDs are stomachaches, heartburn, and nausea. NSAIDs may irritate the stomach lining. If the medicine upsets your stomach, you can try taking it with food. But if that doesn't help, talk with your doctor to make sure it's not a more serious problem, such as a stomach ulcer or bleeding in the stomach or intestines. Using NSAIDs may:  Lead to high blood pressure. Make symptoms of heart failure worse. Raise the risk of heart attack, stroke, kidney damage, and skin reactions. Your risks are greater if you take NSAIDs at higher doses or for longer than the label says. People who are older than 72 or who have heart, stomach, or intestinal disease have a higher risk for problems. Offered PRP injections for the right hamstring partial tear. I have also offered over discussed right hip intra-articular injection for right hip arthritis. She wishes to wait and consider these options. PT- A home exercise program was instructed today including ROM exercises and strengthening exercises.  The patient verbalized understanding of these exercises as well as the importance of the exercise program to promote return of normal function. If pain intensifies or other problems arise you are to notify the office. She will follow-up with her GYN regarding right 7 cm adnexal cyst.    Follow up-    Call or return to clinic if these symptoms worsen or fail to improve as anticipated. Yonis Harrison PA-C   Senior Physician Assistant   [facility]                                         Disclaimer: This note was generated with use of a verbal recognition program (DRAGON) and an attempt was made to check for errors. It is possible that there are still dictated errors within this office note. If so, please bring any significant errors to my attention for an addendum. All efforts were made to ensure that this office note is accurate.

## 2022-09-22 ENCOUNTER — TELEPHONE (OUTPATIENT)
Dept: PULMONOLOGY | Age: 62
End: 2022-09-22

## 2022-09-22 NOTE — PROGRESS NOTES
Sharon Vieira         : 1960  [] [address]     [] [address]      [] [facility]     [facility]    [x] [facility]  [] [facility]   [] Other:  Diagnosis: [x] LESLIE (G47.33) [] CSA (G47.31) [] Apnea (G47.30)   Length of Need: [] 12 Months [x] 99 Months [] Other:    Machine (JOSETTE!): [] Mar [x] ResMed AirSense     Auto [] Other:     [x]  CPAP () [] Bilevel ()   Mode: [x] Auto [] Spontaneous    Mode: [] Auto [] Spontaneous         New machine   13 cm                 Comfort Settings:   - Ramp Pressure: 5 cmH2O                                        - Ramp time: 15 min                                     -  Flex/EPR - 3 full time                                    - For ResMed Bilevel (TiMax-4 sec   TiMin- 0.2 sec)     Humidifier: [x] Heated ()        [x] Water chamber replacement ()/ 1 per 6 months        Mask:  Please always start with the mask the patient used during the titraion   [x] Nasal () /1 per 3 months [x] Full Face () /1 per 3 months   [x] Patient choice -Size and fit mask [x] Patient Choice - Size and fit mask   [] Dispense:  [] Dispense:    [x] Headgear () / 1 per 3 months [x] Headgear () / 1 per 3 months   [x] Replacement Nasal Cushion ()/2 per month [x] Interface Replacement ()/1 per month   [x] Replacement Nasal Pillows ()/2 per month         Tubing: [x] Heated ()/1 per 3 months    [] Standard ()/1 per 3 months [] Other:           Filters: [x] Non-disposable ()/1 per 6 months     [x] Ultra-Fine, Disposable ()/2 per month        Miscellaneous: [x] Chin Strap ()/ 1 per 6 months [] O2 bleed-in:       LPM   [] Oximetry on CPAP/Bilevel []  Other:          Start Order Date: 22    MEDICAL JUSTIFICATION:  I, the undersigned, certify that the above prescribed supplies are medically necessary for this patients wellbeing.   In my opinion, the supplies are both reasonable and necessary in reference to accepted standards of medicalpractice in treatment of this patients condition.     Alvaro Stein MD      NPI: [id number]       Order Signed Date: 09/22/22    Electronically signed by Alvaro Stein MD on 9/22/2022 at 11:47 AM

## 2022-09-22 NOTE — TELEPHONE ENCOUNTER
Patient is calling to let Dr. Natalia Corona know that machine this morning has a message stating that motor has exceeded Life Expectancy.    Please send order     DME company is [employer]

## 2022-11-29 ENCOUNTER — TELEPHONE (OUTPATIENT)
Dept: PULMONOLOGY | Age: 62
End: 2022-11-29

## 2022-11-29 NOTE — TELEPHONE ENCOUNTER
Celsa Leblanc called the office today regarding this patients order for a PAP machine. They state the note from Dr. Loan Gonzales must superficially state that the PAP device is a better option than surgery, otherwise the insurance will not approve this. Message routed to Dr. Loan Gonzales to advise.

## 2022-12-21 DIAGNOSIS — E11.9 TYPE 2 DIABETES MELLITUS WITHOUT COMPLICATION, WITHOUT LONG-TERM CURRENT USE OF INSULIN (HCC): ICD-10-CM

## 2022-12-21 LAB
ESTIMATED AVERAGE GLUCOSE: 157.1 MG/DL
HBA1C MFR BLD: 7.1 %

## 2023-01-18 ENCOUNTER — TELEPHONE (OUTPATIENT)
Dept: PULMONOLOGY | Age: 63
End: 2023-01-18

## 2023-01-18 NOTE — TELEPHONE ENCOUNTER
Farzad Dc- Melodie states the insurance is requiring the new sleep study (HST) regardless of this being a replacement. Last sleep study was done in 2012 in [place]. Please advise.

## 2023-01-18 NOTE — TELEPHONE ENCOUNTER
Josselyn Griggs from [facility] called in from [phone]  Ext [phone]  2012 sleep study does not meet the requirements for her CPAP to be covered. If she hasn't had a sleep study since 2012 she would need to have a new study to get her new CPAP covered by insurance.

## 2023-01-19 DIAGNOSIS — G47.33 OSA (OBSTRUCTIVE SLEEP APNEA): Primary | ICD-10-CM

## 2023-01-19 NOTE — TELEPHONE ENCOUNTER
LM for patient to call the office for information regarding the need for [facility] to [facility].

## 2023-01-19 NOTE — TELEPHONE ENCOUNTER
Patient calls back and expressed understanding and was transferred to schedule an HST at [place].

## 2023-02-15 ENCOUNTER — TELEPHONE (OUTPATIENT)
Dept: PULMONOLOGY | Age: 63
End: 2023-02-15

## 2023-02-15 NOTE — TELEPHONE ENCOUNTER
Hernan Hickman and did tell them we do not have a copy of the HST, only have PSG from 2012 which is what they have as well. Did tell them she had [facility] back in 2018 for her DME. She is going to pass on this information and see if they can contact them to get the HST.

## 2023-02-17 ENCOUNTER — HOSPITAL ENCOUNTER (OUTPATIENT)
Dept: SLEEP CENTER | Age: 63
Discharge: HOME | End: 2023-02-17

## 2023-02-17 DIAGNOSIS — G47.33 OSA (OBSTRUCTIVE SLEEP APNEA): ICD-10-CM

## 2023-02-20 NOTE — PROGRESS NOTES
Efe Mora         : 1960  [] [address]     [] [address]      [] [facility]     [facility]    [x] [facility]  [] [facility]  [] [facility]   [] [facility] [] Other:  Diagnosis: [x] LESLIE (G47.33) [] CSA (G47.31) [] Apnea (G47.30)   Length of Need: [] 12 Months [x] 99 Months [] Other:    Machine (JOSETTE!):  [x] ResMed AirSense     Auto [] Other:     [x]  CPAP () [] Bilevel ()   Mode: [x] Auto [] Spontaneous    Mode: [] Auto [] Spontaneous           Between 5 and 15 cm                 Comfort Settings:   - Ramp Pressure: 5 cmH2O                                        - Ramp time: 15 min                                     -  Flex/EPR - 3 full time                                    - For ResMed Bilevel (TiMax-4 sec   TiMin- 0.2 sec)     Humidifier: [x] Heated ()        [x] Water chamber replacement ()/ 1 per 6 months        Mask:  Please always start with the mask the patient used during the titraion   [x] Nasal () /1 per 3 months [x] Full Face () /1 per 3 months   [x] Patient choice -Size and fit mask [x] Patient Choice - Size and fit mask   [] Dispense:  [] Dispense:    [x] Headgear () / 1 per 3 months [x] Headgear () / 1 per 3 months   [x] Replacement Nasal Cushion ()/2 per month [x] Interface Replacement ()/1 per month   [x] Replacement Nasal Pillows ()/2 per month         Tubing: [x] Heated ()/1 per 3 months    [] Standard ()/1 per 3 months [] Other:           Filters: [x] Non-disposable ()/1 per 6 months     [x] Ultra-Fine, Disposable ()/2 per month        Miscellaneous: [x] Chin Strap ()/ 1 per 6 months [] O2 bleed-in:       LPM   [] Oximetry on CPAP/Bilevel []  Other:          Start Order Date: 23    MEDICAL JUSTIFICATION:  I, the undersigned, certify that the above prescribed supplies are medically necessary for this patients wellbeing.   In my opinion, the supplies are both reasonable and necessary in reference to accepted standards of medicalpractice in treatment of this patients condition.     Marina Danielson MD      NPI: [id number]       Order Signed Date: 02/20/23    Electronically signed by Marina Danielson MD on 2/20/2023 at 2:15 PM

## 2023-02-22 ENCOUNTER — TELEPHONE (OUTPATIENT)
Dept: PULMONOLOGY | Age: 63
End: 2023-02-22

## 2023-02-22 NOTE — TELEPHONE ENCOUNTER
HST Sleep study showed mild LESLIE. AHI was 8.7  per hr. And O2 Desaturations to 85%. Dr Gerard Correa: Follow up with the patient's sleep physician to discuss results  Avoid sedatives, alcohol and caffeinated drinks at bedtime. Avoid driving while sleepy. APAP between 5 and 15 cm is recommended. DME:     LM for patient to call the office for her results. Need her DME to send her order too.

## 2023-02-23 NOTE — TELEPHONE ENCOUNTER
Recent HST in [facility] and faxed to Loren with [facility] today. Purvis Libman also calls back and given results of her sleep study. This was repeated on 2/17/23 as last study done 12 yrs ago. Patient was informed her pressures have been updated and order was sent to [facility].

## 2023-06-05 ENCOUNTER — OFFICE VISIT (OUTPATIENT)
Dept: ENT CLINIC | Age: 63
End: 2023-06-05
Payer: MEDICARE

## 2023-06-05 VITALS
RESPIRATION RATE: 16 BRPM | HEIGHT: 69 IN | OXYGEN SATURATION: 97 % | WEIGHT: 293 LBS | DIASTOLIC BLOOD PRESSURE: 79 MMHG | SYSTOLIC BLOOD PRESSURE: 151 MMHG | BODY MASS INDEX: 43.4 KG/M2 | HEART RATE: 66 BPM

## 2023-06-05 DIAGNOSIS — L43.9 LICHEN PLANUS: Primary | ICD-10-CM

## 2023-06-05 PROCEDURE — 1123F ACP DISCUSS/DSCN MKR DOCD: CPT | Performed by: OTOLARYNGOLOGY

## 2023-06-05 PROCEDURE — 3078F DIAST BP <80 MM HG: CPT | Performed by: OTOLARYNGOLOGY

## 2023-06-05 PROCEDURE — 99213 OFFICE O/P EST LOW 20 MIN: CPT | Performed by: OTOLARYNGOLOGY

## 2023-06-05 PROCEDURE — 3077F SYST BP >= 140 MM HG: CPT | Performed by: OTOLARYNGOLOGY

## 2023-06-05 NOTE — PROGRESS NOTES
mistranscribed and despite editing, the text may contain inaccuracies due to incorrect word recognition. If further clarification is needed please contact the office at [phone].

## 2023-06-06 ENCOUNTER — PATIENT MESSAGE (OUTPATIENT)
Dept: DERMATOLOGY | Age: 63
End: 2023-06-06

## 2023-06-06 ENCOUNTER — OFFICE VISIT (OUTPATIENT)
Dept: DERMATOLOGY | Age: 63
End: 2023-06-06
Payer: MEDICARE

## 2023-06-06 DIAGNOSIS — L71.9 ROSACEA: ICD-10-CM

## 2023-06-06 DIAGNOSIS — L21.9 SEBORRHEIC DERMATITIS: Primary | ICD-10-CM

## 2023-06-06 PROCEDURE — 1123F ACP DISCUSS/DSCN MKR DOCD: CPT | Performed by: DERMATOLOGY

## 2023-06-06 PROCEDURE — 99214 OFFICE O/P EST MOD 30 MIN: CPT | Performed by: DERMATOLOGY

## 2023-06-06 RX ORDER — KETOCONAZOLE 20 MG/ML
SHAMPOO TOPICAL
Qty: 120 ML | Refills: 5 | Status: SHIPPED | OUTPATIENT
Start: 2023-06-06

## 2023-06-06 NOTE — PROGRESS NOTES
[facility] Dermatology  Dena Nolasco MD  [address] [place] [address] Blvd  1960    70 y.o. female     Date of Visit: 6/6/2023    Chief Complaint: rash, facial lesions    History of Present Illness:    1. She returns today to follow-up for history of seborrheic dermatitis of the scalp-reports improvement with use of clobetasol solution. 2.  She also complains of new onset papulopustular eruption on the face. Derm Hx:  Rollins stage 1 hidradenitis of the proximal inner thighs and left lower buttock      Review of Systems:  Gen: Feels well, good sense of health. Past Medical History, Family History, Surgical History, Medications and Allergies reviewed. Past Medical History:   Diagnosis Date    Allergic rhinitis     Asthma     Compulsive overeater     DDD (degenerative disc disease), lumbar     Depression     Diabetes mellitus ([address]     Diabetic retinopathy ([address]     GERD (gastroesophageal reflux disease)     Hyperlipidemia     Hypertension     Hypothyroidism     Lichen planus     Obesity     PONV (postoperative nausea and vomiting)     Pulmonary HTN (HCC)     Sleep apnea     wears cpap    Thyroid nodule     Type 2 diabetes mellitus without complication ([address]     Vitamin D deficiency      Past Surgical History:   Procedure Laterality Date    COLONOSCOPY  08/2015    [facility]- polyp x 2, left sided diverticular disease, repeat 5 years    COLONOSCOPY  10/03/2022    [facility]-mild sigmoid diverticulosis, polyp x3.   Repeat in 5 years    DENTAL SURGERY Left 05/12/2023    extracted upper left molar    LASIK      REVISION TOTAL KNEE ARTHROPLASTY Right     TOTAL KNEE ARTHROPLASTY Bilateral     WISDOM TOOTH EXTRACTION         Allergies   Allergen Reactions    Bactrim [Sulfamethoxazole-Trimethoprim] Nausea Only and Other (See Comments)     Nausea, headache    Penicillins Rash     Outpatient Medications Marked as Taking for the 6/6/23 encounter (Office Visit) with Jessica Billings MD   Medication Sig

## 2023-06-06 NOTE — TELEPHONE ENCOUNTER
From: Cipriano Akhtar  To: Dr. Vincent Valente  Sent: 6/6/2023 5:08 PM EDT  Subject: metroNIDAZOLE    Hi Dr. Mary Grace Dumont,    Is it possible to have the Rx for metroNIDAZOLE moved to the . [address] [facility]? ([address])    [place] is considerably more expensive for this Rx, even with [facility]. Please let me know if you need any other info from me.     Kindest regards,  Karen Givens

## 2023-06-15 ENCOUNTER — PREP FOR PROCEDURE (OUTPATIENT)
Dept: OPHTHALMOLOGY | Age: 63
End: 2023-06-15

## 2023-06-15 RX ORDER — PHENYLEPHRINE HCL 2.5 %
1 DROPS OPHTHALMIC (EYE) SEE ADMIN INSTRUCTIONS
Status: CANCELLED | OUTPATIENT
Start: 2023-06-15

## 2023-06-15 RX ORDER — CIPROFLOXACIN HYDROCHLORIDE 3.5 MG/ML
1 SOLUTION/ DROPS TOPICAL SEE ADMIN INSTRUCTIONS
Status: CANCELLED | OUTPATIENT
Start: 2023-06-15

## 2023-06-15 RX ORDER — KETOROLAC TROMETHAMINE 5 MG/ML
1 SOLUTION OPHTHALMIC SEE ADMIN INSTRUCTIONS
Status: CANCELLED | OUTPATIENT
Start: 2023-06-15

## 2023-06-15 RX ORDER — SODIUM CHLORIDE 0.9 % (FLUSH) 0.9 %
5-40 SYRINGE (ML) INJECTION PRN
Status: CANCELLED | OUTPATIENT
Start: 2023-06-15

## 2023-06-15 RX ORDER — SODIUM CHLORIDE 0.9 % (FLUSH) 0.9 %
5-40 SYRINGE (ML) INJECTION EVERY 12 HOURS SCHEDULED
Status: CANCELLED | OUTPATIENT
Start: 2023-06-15

## 2023-06-15 RX ORDER — SODIUM CHLORIDE 9 MG/ML
INJECTION, SOLUTION INTRAVENOUS PRN
Status: CANCELLED | OUTPATIENT
Start: 2023-06-15

## 2023-06-15 RX ORDER — TROPICAMIDE 10 MG/ML
1 SOLUTION/ DROPS OPHTHALMIC SEE ADMIN INSTRUCTIONS
Status: CANCELLED | OUTPATIENT
Start: 2023-06-15

## 2023-06-15 RX ORDER — TETRACAINE HYDROCHLORIDE 5 MG/ML
1 SOLUTION OPHTHALMIC SEE ADMIN INSTRUCTIONS
Status: CANCELLED | OUTPATIENT
Start: 2023-06-15

## 2023-06-15 NOTE — PROGRESS NOTES
[address]        Pre-Op Phone Call:     Patient Name: Jimbo Casas     Telephone Information:   Mobile [phone]     Home phone:  [phone]    Surgery Time:   10:00 AM     Arrival Time:  8:30am     Left extended Message:  Yes     Message left with:     Recent change in health status:  NA     Advised of transportation/ policy:  Yes     NPO policy reviewed: Yes     Advised to take morning heart/blood pressure medications with sips of water morning of surgery? Yes     Instructed to bring eye drops, photo identification, and insurance card day of surgery? Yes     Advised to wear short sleeved button down shirt (no T-shirt underneath):  Yes     Advised not to wear jewelry, hairpins, or pantyhose day of surgery? Yes     Advised not to wear make-up and to wash face day of surgery? Yes    Remarks:  Left EM with instructions for pt.          Electronically signed by:  Tye Ha RN at 6/15/2023 10:26 AM

## 2023-06-16 ENCOUNTER — HOSPITAL ENCOUNTER (OUTPATIENT)
Age: 63
Setting detail: OUTPATIENT SURGERY
Discharge: HOME | End: 2023-06-16
Attending: OPHTHALMOLOGY | Admitting: OPHTHALMOLOGY
Payer: MEDICARE

## 2023-06-16 VITALS
WEIGHT: 293 LBS | RESPIRATION RATE: 14 BRPM | HEIGHT: 69 IN | SYSTOLIC BLOOD PRESSURE: 129 MMHG | BODY MASS INDEX: 43.4 KG/M2 | TEMPERATURE: 96.8 F | HEART RATE: 63 BPM | DIASTOLIC BLOOD PRESSURE: 74 MMHG | OXYGEN SATURATION: 96 %

## 2023-06-16 LAB
GLUCOSE BLD-MCNC: 142 MG/DL (ref 70–99)
GLUCOSE BLD-MCNC: 143 MG/DL (ref 70–99)
PERFORMED ON: ABNORMAL
PERFORMED ON: ABNORMAL

## 2023-06-16 PROCEDURE — 3700000001 HC ADD 15 MINUTES (ANESTHESIA): Performed by: OPHTHALMOLOGY

## 2023-06-16 PROCEDURE — 2500000003 HC RX 250 WO HCPCS: Performed by: OPHTHALMOLOGY

## 2023-06-16 PROCEDURE — V2632 POST CHMBR INTRAOCULAR LENS: HCPCS | Performed by: OPHTHALMOLOGY

## 2023-06-16 PROCEDURE — 3700000000 HC ANESTHESIA ATTENDED CARE: Performed by: OPHTHALMOLOGY

## 2023-06-16 PROCEDURE — 3600000004 HC SURGERY LEVEL 4 BASE: Performed by: OPHTHALMOLOGY

## 2023-06-16 PROCEDURE — 6370000000 HC RX 637 (ALT 250 FOR IP): Performed by: OPHTHALMOLOGY

## 2023-06-16 PROCEDURE — 2709999900 HC NON-CHARGEABLE SUPPLY: Performed by: OPHTHALMOLOGY

## 2023-06-16 PROCEDURE — 3600000014 HC SURGERY LEVEL 4 ADDTL 15MIN: Performed by: OPHTHALMOLOGY

## 2023-06-16 PROCEDURE — 2580000003 HC RX 258: Performed by: STUDENT IN AN ORGANIZED HEALTH CARE EDUCATION/TRAINING PROGRAM

## 2023-06-16 PROCEDURE — 7100000010 HC PHASE II RECOVERY - FIRST 15 MIN: Performed by: OPHTHALMOLOGY

## 2023-06-16 DEVICE — LENS CLAREON IOL 20.5D: Type: IMPLANTABLE DEVICE | Site: EYE | Status: FUNCTIONAL

## 2023-06-16 RX ORDER — SODIUM CHLORIDE 0.9 % (FLUSH) 0.9 %
5-40 SYRINGE (ML) INJECTION EVERY 12 HOURS SCHEDULED
Status: DISCONTINUED | OUTPATIENT
Start: 2023-06-16 | End: 2023-06-16 | Stop reason: SDUPTHER

## 2023-06-16 RX ORDER — LIDOCAINE HYDROCHLORIDE 10 MG/ML
INJECTION, SOLUTION EPIDURAL; INFILTRATION; INTRACAUDAL; PERINEURAL
Status: COMPLETED | OUTPATIENT
Start: 2023-06-16 | End: 2023-06-16

## 2023-06-16 RX ORDER — SODIUM CHLORIDE 0.9 % (FLUSH) 0.9 %
5-40 SYRINGE (ML) INJECTION PRN
Status: DISCONTINUED | OUTPATIENT
Start: 2023-06-16 | End: 2023-06-16 | Stop reason: SDUPTHER

## 2023-06-16 RX ORDER — OFLOXACIN 3 MG/ML
SOLUTION/ DROPS OPHTHALMIC
Status: COMPLETED | OUTPATIENT
Start: 2023-06-16 | End: 2023-06-16

## 2023-06-16 RX ORDER — SODIUM CHLORIDE 0.9 % (FLUSH) 0.9 %
5-40 SYRINGE (ML) INJECTION PRN
Status: DISCONTINUED | OUTPATIENT
Start: 2023-06-16 | End: 2023-06-16 | Stop reason: HOSPADM

## 2023-06-16 RX ORDER — BALANCED SALT SOLUTION 6.4; .75; .48; .3; 3.9; 1.7 MG/ML; MG/ML; MG/ML; MG/ML; MG/ML; MG/ML
SOLUTION OPHTHALMIC
Status: COMPLETED | OUTPATIENT
Start: 2023-06-16 | End: 2023-06-16

## 2023-06-16 RX ORDER — TROPICAMIDE 10 MG/ML
1 SOLUTION/ DROPS OPHTHALMIC SEE ADMIN INSTRUCTIONS
Status: COMPLETED | OUTPATIENT
Start: 2023-06-16 | End: 2023-06-16

## 2023-06-16 RX ORDER — SODIUM CHLORIDE 9 MG/ML
INJECTION, SOLUTION INTRAVENOUS PRN
Status: DISCONTINUED | OUTPATIENT
Start: 2023-06-16 | End: 2023-06-16 | Stop reason: SDUPTHER

## 2023-06-16 RX ORDER — CIPROFLOXACIN HYDROCHLORIDE 3.5 MG/ML
1 SOLUTION/ DROPS TOPICAL SEE ADMIN INSTRUCTIONS
Status: DISCONTINUED | OUTPATIENT
Start: 2023-06-16 | End: 2023-06-16 | Stop reason: HOSPADM

## 2023-06-16 RX ORDER — TETRACAINE HYDROCHLORIDE 5 MG/ML
SOLUTION OPHTHALMIC
Status: COMPLETED | OUTPATIENT
Start: 2023-06-16 | End: 2023-06-16

## 2023-06-16 RX ORDER — BRIMONIDINE TARTRATE 2 MG/ML
SOLUTION/ DROPS OPHTHALMIC
Status: COMPLETED | OUTPATIENT
Start: 2023-06-16 | End: 2023-06-16

## 2023-06-16 RX ORDER — DEXTROSE MONOHYDRATE 100 MG/ML
INJECTION, SOLUTION INTRAVENOUS CONTINUOUS PRN
Status: DISCONTINUED | OUTPATIENT
Start: 2023-06-16 | End: 2023-06-16 | Stop reason: HOSPADM

## 2023-06-16 RX ORDER — SODIUM CHLORIDE 9 MG/ML
INJECTION, SOLUTION INTRAVENOUS PRN
Status: DISCONTINUED | OUTPATIENT
Start: 2023-06-16 | End: 2023-06-16 | Stop reason: HOSPADM

## 2023-06-16 RX ORDER — KETOROLAC TROMETHAMINE 5 MG/ML
1 SOLUTION OPHTHALMIC SEE ADMIN INSTRUCTIONS
Status: COMPLETED | OUTPATIENT
Start: 2023-06-16 | End: 2023-06-16

## 2023-06-16 RX ORDER — SODIUM CHLORIDE 0.9 % (FLUSH) 0.9 %
5-40 SYRINGE (ML) INJECTION EVERY 12 HOURS SCHEDULED
Status: DISCONTINUED | OUTPATIENT
Start: 2023-06-16 | End: 2023-06-16 | Stop reason: HOSPADM

## 2023-06-16 RX ORDER — PHENYLEPHRINE HCL 2.5 %
1 DROPS OPHTHALMIC (EYE) SEE ADMIN INSTRUCTIONS
Status: COMPLETED | OUTPATIENT
Start: 2023-06-16 | End: 2023-06-16

## 2023-06-16 RX ORDER — TETRACAINE HYDROCHLORIDE 5 MG/ML
1 SOLUTION OPHTHALMIC SEE ADMIN INSTRUCTIONS
Status: DISCONTINUED | OUTPATIENT
Start: 2023-06-16 | End: 2023-06-16 | Stop reason: HOSPADM

## 2023-06-16 RX ADMIN — CIPROFLOXACIN 1 DROP: 3 SOLUTION OPHTHALMIC at 09:09

## 2023-06-16 RX ADMIN — SODIUM CHLORIDE: 9 INJECTION, SOLUTION INTRAVENOUS at 09:15

## 2023-06-16 RX ADMIN — TROPICAMIDE 1 DROP: 10 SOLUTION/ DROPS OPHTHALMIC at 09:17

## 2023-06-16 RX ADMIN — POVIDONE-IODINE: 5 SOLUTION OPHTHALMIC at 09:17

## 2023-06-16 RX ADMIN — PHENYLEPHRINE HYDROCHLORIDE 1 DROP: 25 SOLUTION/ DROPS OPHTHALMIC at 09:09

## 2023-06-16 RX ADMIN — KETOROLAC TROMETHAMINE 1 DROP: 0.5 SOLUTION OPHTHALMIC at 09:09

## 2023-06-16 RX ADMIN — TROPICAMIDE 1 DROP: 10 SOLUTION/ DROPS OPHTHALMIC at 09:13

## 2023-06-16 RX ADMIN — TROPICAMIDE 1 DROP: 10 SOLUTION/ DROPS OPHTHALMIC at 09:09

## 2023-06-16 RX ADMIN — TETRACAINE HYDROCHLORIDE 1 DROP: 5 SOLUTION OPHTHALMIC at 09:09

## 2023-06-16 RX ADMIN — PHENYLEPHRINE HYDROCHLORIDE 1 DROP: 25 SOLUTION/ DROPS OPHTHALMIC at 09:17

## 2023-06-16 RX ADMIN — PHENYLEPHRINE HYDROCHLORIDE 1 DROP: 25 SOLUTION/ DROPS OPHTHALMIC at 09:13

## 2023-06-16 ASSESSMENT — PAIN SCALES - GENERAL
PAINLEVEL_OUTOF10: 0

## 2023-06-16 NOTE — OP NOTE
Link Knowles    OPERATIVE NOTE    Preoperative Diagnosis: Cataract right eye    Postoperative Diagnosis: Cataract right eye    Procedure: Phacoemulsification with intraocular lens implantation, right eye  Surgeon: Kajal Pollack MD    Anesthesia: MAC, topical.    Complications: none    Estimated blood loss: less than 1 ml. Specimens: none    Indications for procedure: The patient is a 70y.o. year old with decreased vision, glare and halos around lights, and trouble with activities of daily living. Examination revealed a visually significant cataract in the right eye. Risks, benefits, and alternatives to surgery were discussed with the patient and the patient elected to proceed with phacoemulsification with lens implantation. Details of the procedure: Following informed consent, the patient was taken to the operating room and placed in the supine position. Monitored anesthesia care was administered. The eye was prepped and draped in the usual sterile fashion using aseptic technique for cataract surgery. A side port incision was made. 1% preservative free lidocaine was injected through the side port incision for topical anesthesia. The eye was filled with viscoelastic and a 2.4 mm keratome blade was used to make a 3-plane clear corneal incision in the temporal cornea. The cystitome was used to make a tear in the anterior capsule and a Utrata forceps was used to make a complete curvilinear capsulorrhexis. The lens was hydrodissected and freely rotated. Phacoemulsification was performed. Irrigation/aspiration was used to remove all cortical material from the capsular bag. The eye was filled with viscoelastic and a foldable posterior chamber intraocular lens was injected into the capsular bag. The lens was rotated to the appropriate axis as needed. Irrigation/aspiration was used to remove all excess viscoelastic.   The eye was pressurized with BSS and the wounds were check for leaks and none were

## 2023-06-16 NOTE — DISCHARGE INSTRUCTIONS
Post-Operative Instructions After Cataract Surgery  Fredy Pierson M.D.    [phone]    right    @ED@    Patient Name: Pierre Candelario  : 1960  MRN: [id number]      Wear your protective shield at bedtime and nap time for 1 week to prevent accidentally rubbing or bumping your eye. The post-operative drops are VERY IMPORTANT. Use them as directed on the drop schedule given to you the day of surgery. Do not lift over 25 lbs for the first week. DO NOT RUB YOUR EYE. You may wash your hair 24 hours after surgery, but avoid getting water in your eye for the first 5 days. Use a dry wash cloth to protect water from getting in your eye while taking a shower. No eye makeup for 1 week. You may return to work anytime provided your job does not require heavy lifting or straining. If you work in a [employer] environment, please take one week off work after surgery. CALL THE OFFICE IMMEDIATELY IF YOU EXPERIENCE ANY OF THE FOLLOWING                   [phone]  Veil or curtain coming across vision. Sudden decrease in vision. Shower of NEW floaters. Increase in pain. Flashes of light.

## 2023-06-27 ENCOUNTER — HOSPITAL ENCOUNTER (OUTPATIENT)
Dept: WOMENS IMAGING | Age: 63
Discharge: HOME | End: 2023-06-27
Payer: MEDICARE

## 2023-06-27 DIAGNOSIS — Z12.31 BREAST CANCER SCREENING BY MAMMOGRAM: ICD-10-CM

## 2023-06-27 PROCEDURE — 77063 BREAST TOMOSYNTHESIS BI: CPT

## 2023-07-12 ENCOUNTER — HOSPITAL ENCOUNTER (OUTPATIENT)
Dept: ULTRASOUND IMAGING | Age: 63
Discharge: HOME | End: 2023-07-12
Payer: MEDICARE

## 2023-07-12 ENCOUNTER — HOSPITAL ENCOUNTER (OUTPATIENT)
Dept: MAMMOGRAPHY | Age: 63
Discharge: HOME | End: 2023-07-12
Payer: MEDICARE

## 2023-07-12 DIAGNOSIS — R92.8 ABNORMAL MAMMOGRAM: ICD-10-CM

## 2023-07-12 PROCEDURE — G0279 TOMOSYNTHESIS, MAMMO: HCPCS

## 2023-07-20 ENCOUNTER — HOSPITAL ENCOUNTER (OUTPATIENT)
Dept: NON INVASIVE DIAGNOSTICS | Age: 63
Discharge: HOME | End: 2023-07-20
Payer: MEDICARE

## 2023-07-20 LAB
LV EF: 60 %
LVEF MODALITY: NORMAL

## 2023-07-20 PROCEDURE — 93306 TTE W/DOPPLER COMPLETE: CPT

## 2023-07-25 ENCOUNTER — HOSPITAL ENCOUNTER (OUTPATIENT)
Dept: WOMENS IMAGING | Age: 63
Discharge: HOME | End: 2023-07-25
Attending: INTERNAL MEDICINE
Payer: MEDICARE

## 2023-07-25 DIAGNOSIS — Z78.0 MENOPAUSE: ICD-10-CM

## 2023-07-25 PROCEDURE — 77080 DXA BONE DENSITY AXIAL: CPT

## 2023-07-31 RX ORDER — CLOBETASOL PROPIONATE 0.46 MG/ML
SOLUTION TOPICAL
Qty: 50 ML | Refills: 2 | Status: SHIPPED | OUTPATIENT
Start: 2023-07-31

## 2023-08-22 ENCOUNTER — HOSPITAL ENCOUNTER (OUTPATIENT)
Dept: PHYSICAL THERAPY | Age: 63
Setting detail: THERAPIES SERIES
Discharge: HOME | End: 2023-08-22

## 2023-08-23 ENCOUNTER — OFFICE VISIT (OUTPATIENT)
Dept: CARDIOLOGY CLINIC | Age: 63
End: 2023-08-23

## 2023-08-23 VITALS
SYSTOLIC BLOOD PRESSURE: 128 MMHG | OXYGEN SATURATION: 99 % | BODY MASS INDEX: 47.26 KG/M2 | WEIGHT: 293 LBS | HEART RATE: 69 BPM | DIASTOLIC BLOOD PRESSURE: 80 MMHG

## 2023-08-23 DIAGNOSIS — I10 ESSENTIAL HYPERTENSION: Primary | ICD-10-CM

## 2023-08-23 DIAGNOSIS — E78.2 MIXED HYPERLIPIDEMIA: ICD-10-CM

## 2023-08-23 DIAGNOSIS — E11.9 TYPE 2 DIABETES MELLITUS WITHOUT COMPLICATION, WITHOUT LONG-TERM CURRENT USE OF INSULIN (HCC): ICD-10-CM

## 2023-08-23 DIAGNOSIS — R93.1 ABNORMAL ECHOCARDIOGRAM: ICD-10-CM

## 2023-08-23 NOTE — PROGRESS NOTES
2+ radial/carotid bilaterally. Neurological: No gross cranial nerve deficit. Coordination normal.   Skin: Skin is warm and dry. There is no rash or diaphoresis. Psychiatric: She has a normal mood and affect. Her speech is normal and behavior is normal.     Lab Review:   FLP:    Lab Results   Component Value Date/Time    TRIG 151 06/22/2023 12:07 PM    HDL 48 06/22/2023 12:07 PM    LDLCALC 98 06/22/2023 12:07 PM    LDLDIRECT 85 05/24/2022 11:50 AM    LABVLDL 30 06/22/2023 12:07 PM     BUN/Creatinine:    Lab Results   Component Value Date/Time    BUN 17 06/22/2023 12:07 PM    CREATININE 0.9 06/22/2023 12:07 PM       EKG Interpretation: Sinus bradycardia, rate 57    Image Review:     ECHO 7/20/23  Summary  Ejection fraction is visually estimated to be 60%. Borderline left ventricular hypertrophy. .  Normal right ventricular size and function. pericardial fat pad or small effusion     Assessment/Plan:     Essential hypertension. Controlled. Goal BP <130/80. Continue medical therapy. Hyperlipidemia goal LDL <100. Continue low dose statin therapy. Diabetes mellitus, type 2. Managed per PCP. Continue current medical therapy. Abnormal ECHO/EKG. Reviewed. No significant fluid, likely pericardial tissue. No need for acute intervention. Advised to call with any new or worsening symptoms. Return to the office on an as needed basis. Thank you very much for allowing me to participate in the care of your patient. Please do not hesitate to contact me if you have any questions. Sincerely,    Herson Nunn MD  Interventional Cardiology  [facility], [place]  [address], [address] [place], [address] [facility]  Ph: [phone]  Fax: [phone]    This note was scribed in the presence of Dr. Eulalia Taylor MD by Ran Foley RN    Physician Attestation:  The scribes documentation has been prepared under my direction and personally reviewed by me in its entirety.  I confirm

## 2024-01-15 ENCOUNTER — TELEPHONE (OUTPATIENT)
Dept: ORTHOPEDIC SURGERY | Age: 64
End: 2024-01-15

## 2024-05-21 NOTE — ADDENDUM NOTE
Addended by: Demond Lockwood on: 4/17/2019 12:01 PM     Modules accepted: Orders Hi, she needs to have an appointment scheduled with me for me to refill her medication.  Please call her to schedule a well woman physical with pap, can use reschedule slots June 20th and June 21st.  Please let me know when she has an appointment scheduled and I'll refill her medication.    Sincerely,  Dr. Leyla Garay MD  5/21/2024

## 2024-06-10 ENCOUNTER — OFFICE VISIT (OUTPATIENT)
Dept: ENT CLINIC | Age: 64
End: 2024-06-10
Payer: MEDICARE

## 2024-06-10 VITALS
OXYGEN SATURATION: 95 % | HEART RATE: 71 BPM | BODY MASS INDEX: 43.4 KG/M2 | HEIGHT: 69 IN | DIASTOLIC BLOOD PRESSURE: 80 MMHG | WEIGHT: 293 LBS | SYSTOLIC BLOOD PRESSURE: 142 MMHG

## 2024-06-10 DIAGNOSIS — L43.9 LICHEN PLANUS: Primary | ICD-10-CM

## 2024-06-10 PROCEDURE — 99213 OFFICE O/P EST LOW 20 MIN: CPT | Performed by: OTOLARYNGOLOGY

## 2024-06-10 PROCEDURE — 3077F SYST BP >= 140 MM HG: CPT | Performed by: OTOLARYNGOLOGY

## 2024-06-10 PROCEDURE — 3079F DIAST BP 80-89 MM HG: CPT | Performed by: OTOLARYNGOLOGY

## 2024-06-10 PROCEDURE — 1123F ACP DISCUSS/DSCN MKR DOCD: CPT | Performed by: OTOLARYNGOLOGY

## 2024-06-10 NOTE — PROGRESS NOTES
[facility]  DIVISION OF OTOLARYNGOLOGY- HEAD & NECK SURGERY  Follow up      Patient Name: Sarahi Freedman  Medical Record Number:  [id number]  Primary Care Physician:  Diego Mcgee MD  Date of Consultation: 6/10/2024    Chief Complaint: Oral lesions        Interval History    Patient is following up for her oral lesions.  She has lichen planus in the mouth that we have been following for a long time.  Overall she has not had any significant changes.  She did feel like something was flaring up for a few days on the right side, but it resolved on its own.        REVIEW OF SYSTEMS  As above    PHYSICAL EXAM  GENERAL: No Acute Distress, Alert and Oriented, no Hoarseness, strong voice  EYES: EOMI, Anti-icteric  HENT:   Head: Normocephalic and atraumatic.   Face:  Symmetric, facial nerve intact, no sinus tenderness  Right Ear: Normal external ear, normal external auditory canal, intact tympanic membrane with normal mobility and aerated middle ear  Left Ear: Normal external ear, normal external auditory canal, intact tympanic membrane with normal mobility and aerated middle ear  Mouth/Oral Cavity: Changes of bilateral buccal mucosa worse on the left than right  Oropharynx/Larynx:  normal oropharynx  Nose:Normal external nasal appearance.    NECK: Normal range of motion, no thyromegaly, trachea is midline, no lymphadenopathy, no neck masses, no crepitus                ASSESSMENT/PLAN  1. Lichen planus  Appears to be unchanged.  Plan for 1 year follow-up.  Follow-up sooner if there are significant changes.           I have performed a head and neck physical exam personally or was physically present during the key or critical portions of the service.    This note was generated completely or in part utilizing Dragon dictation speech recognition software.  Occasionally, words are mistranscribed and despite editing, the text may contain inaccuracies due to incorrect word recognition.  If further clarification is

## 2024-06-11 ENCOUNTER — OFFICE VISIT (OUTPATIENT)
Dept: DERMATOLOGY | Age: 64
End: 2024-06-11
Payer: MEDICARE

## 2024-06-11 DIAGNOSIS — L21.9 SEBORRHEIC DERMATITIS: Primary | ICD-10-CM

## 2024-06-11 DIAGNOSIS — L71.9 ROSACEA: ICD-10-CM

## 2024-06-11 PROCEDURE — 1123F ACP DISCUSS/DSCN MKR DOCD: CPT | Performed by: DERMATOLOGY

## 2024-06-11 PROCEDURE — 99214 OFFICE O/P EST MOD 30 MIN: CPT | Performed by: DERMATOLOGY

## 2024-06-11 NOTE — PROGRESS NOTES
tablet by mouth daily 30 tablet 0    vitamin D (CHOLECALCIFEROL) 1000 units TABS tablet Take 1 tablet by mouth 2 times daily 30 tablet     Flaxseed, Linseed, (FLAXSEED OIL MAX STR) 1300 MG CAPS Take 1 tablet by mouth daily 30 capsule 0    Multiple Vitamins-Minerals (VISION VITAMINS) TABS Take 1 tablet by mouth daily 30 tablet 0    Turmeric Curcumin 500 MG CAPS Take 1 tablet by mouth 2 times daily 30 capsule 0    B Complex-Folic Acid (SUPER B COMPLEX MAXI) TABS Take 1 tablet by mouth daily 30 tablet 0    Magnesium 400 MG TABS Take 1 tablet by mouth daily 30 tablet 0    aspirin 81 MG chewable tablet Take 1 tablet by mouth daily      Omega-3 Fatty Acids (FISH OIL CONCENTRATE PO) Take by mouth Krill oil      Multiple Vitamins-Minerals (THERAPEUTIC MULTIVITAMIN-MINERALS) tablet Take 1 tablet by mouth daily      Calcium Acetate, Phos Binder, (CALCIUM ACETATE PO) Take by mouth daily              Physical Examination       Well appearing.    1.  Scalp clear - free of erythema and scaling.  Couple of excoriations on the right temporal scalp.     2.  Lower face with a rare erythematous papule.         Assessment and Plan     1. Seborrheic dermatitis of the scalp - well controlled    Continue ketoconazole 2% shampoo 3 times weekly.     Clobetasol solution daily as needed.      2. Rosacea - papulopustular, improved but not clear    Encouraged use of metronidazole 0.75% cream twice daily to the skin of the forehead, nose, cheeks and chin for maintenance.          Return in about 1 year (around 6/11/2025).    --Diego Welsh MD

## 2024-06-12 RX ORDER — KETOCONAZOLE 20 MG/ML
SHAMPOO TOPICAL
Qty: 120 ML | Refills: 5 | Status: SHIPPED | OUTPATIENT
Start: 2024-06-12

## 2024-07-30 ENCOUNTER — HOSPITAL ENCOUNTER (OUTPATIENT)
Dept: WOMENS IMAGING | Age: 64
Discharge: HOME | End: 2024-07-30
Payer: MEDICARE

## 2024-07-30 VITALS — WEIGHT: 293 LBS | HEIGHT: 69 IN | BODY MASS INDEX: 43.4 KG/M2

## 2024-07-30 DIAGNOSIS — Z12.31 SCREENING MAMMOGRAM, ENCOUNTER FOR: ICD-10-CM

## 2024-07-30 PROCEDURE — 77063 BREAST TOMOSYNTHESIS BI: CPT

## 2024-11-22 RX ORDER — CLOBETASOL PROPIONATE 0.5 MG/ML
SOLUTION TOPICAL
Qty: 50 ML | Refills: 2 | Status: SHIPPED | OUTPATIENT
Start: 2024-11-22

## 2025-03-26 ENCOUNTER — PATIENT MESSAGE (OUTPATIENT)
Dept: PULMONOLOGY | Age: 65
End: 2025-03-26

## 2025-04-24 ENCOUNTER — OFFICE VISIT (OUTPATIENT)
Dept: SLEEP MEDICINE | Age: 65
End: 2025-04-24
Payer: MEDICARE

## 2025-04-24 VITALS
RESPIRATION RATE: 18 BRPM | TEMPERATURE: 97.9 F | HEIGHT: 69 IN | OXYGEN SATURATION: 98 % | SYSTOLIC BLOOD PRESSURE: 145 MMHG | DIASTOLIC BLOOD PRESSURE: 80 MMHG | HEART RATE: 68 BPM | WEIGHT: 293 LBS | BODY MASS INDEX: 43.4 KG/M2

## 2025-04-24 DIAGNOSIS — I27.20 PULMONARY HTN (HCC): ICD-10-CM

## 2025-04-24 DIAGNOSIS — E66.813 CLASS 3 SEVERE OBESITY DUE TO EXCESS CALORIES WITH SERIOUS COMORBIDITY AND BODY MASS INDEX (BMI) OF 45.0 TO 49.9 IN ADULT (HCC): ICD-10-CM

## 2025-04-24 DIAGNOSIS — G47.33 OSA ON CPAP: Primary | ICD-10-CM

## 2025-04-24 DIAGNOSIS — Z99.89 DEPENDENCE ON OTHER ENABLING MACHINES AND DEVICES: ICD-10-CM

## 2025-04-24 DIAGNOSIS — I10 PRIMARY HYPERTENSION: ICD-10-CM

## 2025-04-24 PROCEDURE — G8427 DOCREV CUR MEDS BY ELIG CLIN: HCPCS | Performed by: PSYCHIATRY & NEUROLOGY

## 2025-04-24 PROCEDURE — G2211 COMPLEX E/M VISIT ADD ON: HCPCS | Performed by: PSYCHIATRY & NEUROLOGY

## 2025-04-24 PROCEDURE — 99214 OFFICE O/P EST MOD 30 MIN: CPT | Performed by: PSYCHIATRY & NEUROLOGY

## 2025-04-24 PROCEDURE — 1036F TOBACCO NON-USER: CPT | Performed by: PSYCHIATRY & NEUROLOGY

## 2025-04-24 PROCEDURE — 1123F ACP DISCUSS/DSCN MKR DOCD: CPT | Performed by: PSYCHIATRY & NEUROLOGY

## 2025-04-24 PROCEDURE — G8399 PT W/DXA RESULTS DOCUMENT: HCPCS | Performed by: PSYCHIATRY & NEUROLOGY

## 2025-04-24 PROCEDURE — 3079F DIAST BP 80-89 MM HG: CPT | Performed by: PSYCHIATRY & NEUROLOGY

## 2025-04-24 PROCEDURE — G8417 CALC BMI ABV UP PARAM F/U: HCPCS | Performed by: PSYCHIATRY & NEUROLOGY

## 2025-04-24 PROCEDURE — 1160F RVW MEDS BY RX/DR IN RCRD: CPT | Performed by: PSYCHIATRY & NEUROLOGY

## 2025-04-24 PROCEDURE — 1090F PRES/ABSN URINE INCON ASSESS: CPT | Performed by: PSYCHIATRY & NEUROLOGY

## 2025-04-24 PROCEDURE — 3017F COLORECTAL CA SCREEN DOC REV: CPT | Performed by: PSYCHIATRY & NEUROLOGY

## 2025-04-24 PROCEDURE — 1159F MED LIST DOCD IN RCRD: CPT | Performed by: PSYCHIATRY & NEUROLOGY

## 2025-04-24 PROCEDURE — 3077F SYST BP >= 140 MM HG: CPT | Performed by: PSYCHIATRY & NEUROLOGY

## 2025-04-24 ASSESSMENT — SLEEP AND FATIGUE QUESTIONNAIRES
HOW LIKELY ARE YOU TO NOD OFF OR FALL ASLEEP WHILE LYING DOWN TO REST IN THE AFTERNOON WHEN CIRCUMSTANCES PERMIT: SLIGHT CHANCE OF DOZING
HOW LIKELY ARE YOU TO NOD OFF OR FALL ASLEEP WHILE WATCHING TV: MODERATE CHANCE OF DOZING
ESS TOTAL SCORE: 8
HOW LIKELY ARE YOU TO NOD OFF OR FALL ASLEEP WHEN YOU ARE A PASSENGER IN A CAR FOR AN HOUR WITHOUT A BREAK: SLIGHT CHANCE OF DOZING
HOW LIKELY ARE YOU TO NOD OFF OR FALL ASLEEP WHILE SITTING INACTIVE IN A PUBLIC PLACE: SLIGHT CHANCE OF DOZING
HOW LIKELY ARE YOU TO NOD OFF OR FALL ASLEEP WHILE LYING DOWN TO REST IN THE AFTERNOON WHEN CIRCUMSTANCES PERMIT: SLIGHT CHANCE OF DOZING
HOW LIKELY ARE YOU TO NOD OFF OR FALL ASLEEP WHILE SITTING INACTIVE IN A PUBLIC PLACE: SLIGHT CHANCE OF DOZING
HOW LIKELY ARE YOU TO NOD OFF OR FALL ASLEEP WHILE SITTING AND TALKING TO SOMEONE: WOULD NEVER DOZE
HOW LIKELY ARE YOU TO NOD OFF OR FALL ASLEEP IN A CAR, WHILE STOPPED FOR A FEW MINUTES IN TRAFFIC: WOULD NEVER DOZE
HOW LIKELY ARE YOU TO NOD OFF OR FALL ASLEEP WHILE SITTING AND READING: MODERATE CHANCE OF DOZING
HOW LIKELY ARE YOU TO NOD OFF OR FALL ASLEEP WHILE WATCHING TV: MODERATE CHANCE OF DOZING
HOW LIKELY ARE YOU TO NOD OFF OR FALL ASLEEP IN A CAR, WHILE STOPPED FOR A FEW MINUTES IN TRAFFIC: WOULD NEVER DOZE
HOW LIKELY ARE YOU TO NOD OFF OR FALL ASLEEP WHILE SITTING QUIETLY AFTER LUNCH WITHOUT ALCOHOL: SLIGHT CHANCE OF DOZING
HOW LIKELY ARE YOU TO NOD OFF OR FALL ASLEEP WHEN YOU ARE A PASSENGER IN A CAR FOR AN HOUR WITHOUT A BREAK: SLIGHT CHANCE OF DOZING
HOW LIKELY ARE YOU TO NOD OFF OR FALL ASLEEP WHILE SITTING AND READING: MODERATE CHANCE OF DOZING
HOW LIKELY ARE YOU TO NOD OFF OR FALL ASLEEP WHILE SITTING QUIETLY AFTER LUNCH WITHOUT ALCOHOL: SLIGHT CHANCE OF DOZING
HOW LIKELY ARE YOU TO NOD OFF OR FALL ASLEEP WHILE SITTING AND TALKING TO SOMEONE: WOULD NEVER DOZE

## 2025-04-24 NOTE — PROGRESS NOTES
MD PANTERA Cerrato Board Certified in Sleep Medicine  Certified in Behavioral Sleep Medicine  Board Certified in Neurology [facility]  [address]   [address]  [place]  [address]  [phone]   [facility]  [address]  [address]  [place], [place] [address]                      [facility]  [address] [address] [phone]    Subjective:     Patient ID: Sarahi Freedman is a 65 y.o. female.    Chief Complaint   Patient presents with    Follow-up    Sleep Apnea       HPI:        Sarahi Freedman is a 65 y.o. female was seen today as a follow for  moderate obstructive sleep apnea with an AHI - 26.4/hr with Low SaO2 - 85%.   Patient is using the PAP machine about 100% of the time, more than 4 hours a night about  100 %, in total average of 8:12 hours a night in last 90 days.  Currently on PAP at 13 cm (), the AHI is only 0.2 events per hour at this pressure.  Patient improved regarding daytime sleepiness and fatigue, wakes up refreshed in the morning.  The Patient scored [place] Sleepiness Score: (Patient-Rptd) 8 on [place] Sleepiness Scale ( more than 10 is indicative of daytime sleepiness)   Patient has no problem with PAP pressure or mask, uses nasal mask. N30 i  Wakes up in the morning with dry mouth, the setting of the heated humidifier at   BP, pulmonary HTN are stable. Has not gained weight pounds since last visit. 318  DOT/CDL - N/A      Previous Report(s)Reviewed: historical medical records         Social History     Socioeconomic History    Marital status: Single     Spouse name: Not on file    Number of children: 0    Years of education: Not on file    Highest education level: Not on file   Occupational History    Occupation: not working right now     Comment: looking for a part time job   Tobacco Use    Smoking status: Never    Smokeless

## 2025-04-24 NOTE — PROGRESS NOTES
Sarahi Freedman         : 1960  [] [facility]     [] [facility]      [] [employer]     [][employer]    [x] [employer]  [] [employer]   [] [facility])  [] [employer] [] [employer] [] [employer] [] Patient Aids [] [employer] [] [employer]  Diagnosis: [x] LESLIE (G47.33) [] CSA (G47.31) [] Apnea (G47.30)   Length of Need: [] 12 Months [x] 99 Months [] Other:    Machine (JOSETTE!):  [x] [employer]Med AirSense     Auto [] Other:       Humidifier: [x] Heated ()        [x] Water chamber replacement ()/ 1 per 6 months        Mask:  Please always start with the mask the patient used during the titraion   [x] Nasal () /1 per 3 months    [x] Patient choice -Size and fit mask    [] Dispense: N30 I with chin strap.    [x] Headgear () / 1 per 6 months    [x] Replacement Nasal Cushion ()/2 per month             Tubing: [x] Heated ()/1 per 3 months    [] Standard ()/1 per 3 months [] Other:           Filters: [x] Non-disposable ()/1 per 6 months     [x] Ultra-Fine, Disposable ()/2 per month        Miscellaneous: [x] Chin Strap ()/ 1 per 6 months [] O2 bleed-in:       LPM   [] Oximetry on CPAP/Bilevel []  Other:          Start Order Date: 25    MEDICAL JUSTIFICATION:  I, the undersigned, certify that the above prescribed supplies are medically necessary for this patient’s wellbeing.  In my opinion, the supplies are both reasonable and necessary in reference to accepted standards of medicalpractice in treatment of this patient’s condition.    Tamika Cerrato MD      NPI: [id number]       Order Signed Date: 25    Electronically signed by Tamika Cerrato MD on 2025 at 1:09 PM

## 2025-06-09 ENCOUNTER — OFFICE VISIT (OUTPATIENT)
Dept: ENT CLINIC | Age: 65
End: 2025-06-09

## 2025-06-09 VITALS — BODY MASS INDEX: 43.4 KG/M2 | HEIGHT: 69 IN | WEIGHT: 293 LBS

## 2025-06-09 DIAGNOSIS — K13.70 ORAL LESION: Primary | ICD-10-CM

## 2025-06-09 DIAGNOSIS — L43.9 LICHEN PLANUS: ICD-10-CM

## 2025-06-26 ENCOUNTER — OFFICE VISIT (OUTPATIENT)
Age: 65
End: 2025-06-26
Payer: MEDICARE

## 2025-06-26 DIAGNOSIS — L82.1 SK (SEBORRHEIC KERATOSIS): ICD-10-CM

## 2025-06-26 DIAGNOSIS — L28.1 PRURIGO NODULARIS: ICD-10-CM

## 2025-06-26 DIAGNOSIS — L21.9 SEBORRHEIC DERMATITIS: Primary | ICD-10-CM

## 2025-06-26 DIAGNOSIS — L71.9 ROSACEA: ICD-10-CM

## 2025-06-26 PROCEDURE — 99212 OFFICE O/P EST SF 10 MIN: CPT | Performed by: DERMATOLOGY

## 2025-06-26 PROCEDURE — 1036F TOBACCO NON-USER: CPT | Performed by: DERMATOLOGY

## 2025-06-26 PROCEDURE — G8399 PT W/DXA RESULTS DOCUMENT: HCPCS | Performed by: DERMATOLOGY

## 2025-06-26 PROCEDURE — 1160F RVW MEDS BY RX/DR IN RCRD: CPT | Performed by: DERMATOLOGY

## 2025-06-26 PROCEDURE — 99213 OFFICE O/P EST LOW 20 MIN: CPT | Performed by: DERMATOLOGY

## 2025-06-26 PROCEDURE — 3017F COLORECTAL CA SCREEN DOC REV: CPT | Performed by: DERMATOLOGY

## 2025-06-26 PROCEDURE — G8417 CALC BMI ABV UP PARAM F/U: HCPCS | Performed by: DERMATOLOGY

## 2025-06-26 PROCEDURE — G8427 DOCREV CUR MEDS BY ELIG CLIN: HCPCS | Performed by: DERMATOLOGY

## 2025-06-26 PROCEDURE — 1159F MED LIST DOCD IN RCRD: CPT | Performed by: DERMATOLOGY

## 2025-06-26 PROCEDURE — 1123F ACP DISCUSS/DSCN MKR DOCD: CPT | Performed by: DERMATOLOGY

## 2025-06-26 PROCEDURE — 1090F PRES/ABSN URINE INCON ASSESS: CPT | Performed by: DERMATOLOGY

## 2025-06-26 NOTE — PROGRESS NOTES
[facility] Dermatology  Diego Welsh MD  [phone]      Sarahi Freedman  1960    65 y.o. female     Date of Visit: 6/26/2025    Chief Complaint: Scalp issue, rosacea    History of Present Illness:    1.  She returns today for history of chronic seborrheic dermatitis of the scalp-reports excellent control with use of ketoconazole 2% shampoo 3 times weekly.  She has used clobetasol solution in the past for flares.    2.  Follow-up for acne rosacea-reports good control with use of metronidazole 0.75% cream daily.    3.  She reports a couple of persistent itchy lesions on the right side of the scalp.    4.  She also reports a newly noted lesion on the right shin.    Derm Hx:  Rollins stage 1 hidradenitis of the proximal inner thighs and left lower buttock      Review of Systems:  Gen: Feels well, good sense of health.    Past Medical History, Family History, Surgical History, Medications and Allergies reviewed.    Past Medical History:   Diagnosis Date    Allergic rhinitis     Asthma     Compulsive overeater     DDD (degenerative disc disease), lumbar     Depression     Diabetes mellitus (HCC)     Diabetic retinopathy (HCC)     GERD (gastroesophageal reflux disease)     Hyperlipidemia     Hypertension     Hypothyroidism     Lichen planus     Obesity     PONV (postoperative nausea and vomiting)     Pulmonary HTN (HCC)     Sleep apnea     wears cpap    Thyroid nodule     Type 2 diabetes mellitus without complication (HCC)     Vitamin D deficiency      Past Surgical History:   Procedure Laterality Date    CATARACT REMOVAL Right 06/16/2023    [place]    COLONOSCOPY  08/2015    [facility]- polyp x 2, left sided diverticular disease, repeat 5 years    COLONOSCOPY  10/03/2022    [facility]-mild sigmoid diverticulosis, polyp x3.  Repeat in 5 years    DENTAL SURGERY Left 05/12/2023    extracted upper left molar    EYE SURGERY Right 06/16/2023    PHACOEMULSIFICATION WITH INTRAOCULAR LENS IMPLANT - RIGHT EYE performed

## 2025-07-30 ENCOUNTER — HOSPITAL ENCOUNTER (OUTPATIENT)
Dept: WOMENS IMAGING | Age: 65
Discharge: HOME | End: 2025-07-30
Payer: MEDICARE

## 2025-07-30 VITALS — WEIGHT: 293 LBS | BODY MASS INDEX: 43.4 KG/M2 | HEIGHT: 69 IN

## 2025-07-30 DIAGNOSIS — Z12.31 VISIT FOR SCREENING MAMMOGRAM: ICD-10-CM

## 2025-07-30 PROCEDURE — 77063 BREAST TOMOSYNTHESIS BI: CPT

## 2025-08-11 RX ORDER — KETOCONAZOLE 20 MG/ML
SHAMPOO, SUSPENSION TOPICAL
Qty: 120 ML | Refills: 5 | Status: SHIPPED | OUTPATIENT
Start: 2025-08-11

## (undated) DEVICE — WIPE INSTR W73XL73CM VISITEC

## (undated) DEVICE — SOLUTION IRRIG 500ML STRL H2O NONPYROGENIC

## (undated) DEVICE — Device: Brand: MEDEX

## (undated) DEVICE — GLOVE SURG SZ 75 CRM LTX FREE POLYISOPRENE POLYMER BEAD ANTI

## (undated) DEVICE — SURGICAL PROC PACK SHT WEST V4

## (undated) DEVICE — SOLUTION IRRIG BSS ST 500ML

## (undated) DEVICE — Device

## (undated) DEVICE — SYRINGE TB 1ML NDL 25GA L0.625IN PLAS SLIP TIP CONVENTIONAL

## (undated) DEVICE — SYRINGE MED 3ML CLR PLAS STD N CTRL LUERLOCK TIP DISP

## (undated) DEVICE — SYRINGE MED 30ML STD CLR PLAS LUERLOCK TIP N CTRL DISP